# Patient Record
Sex: FEMALE | Race: WHITE | NOT HISPANIC OR LATINO | Employment: FULL TIME | URBAN - METROPOLITAN AREA
[De-identification: names, ages, dates, MRNs, and addresses within clinical notes are randomized per-mention and may not be internally consistent; named-entity substitution may affect disease eponyms.]

---

## 2017-01-10 ENCOUNTER — ALLSCRIPTS OFFICE VISIT (OUTPATIENT)
Dept: OTHER | Facility: OTHER | Age: 32
End: 2017-01-10

## 2017-08-10 ENCOUNTER — ALLSCRIPTS OFFICE VISIT (OUTPATIENT)
Dept: OTHER | Facility: OTHER | Age: 32
End: 2017-08-10

## 2017-10-05 ENCOUNTER — APPOINTMENT (EMERGENCY)
Dept: RADIOLOGY | Facility: HOSPITAL | Age: 32
End: 2017-10-05
Payer: COMMERCIAL

## 2017-10-05 ENCOUNTER — HOSPITAL ENCOUNTER (EMERGENCY)
Facility: HOSPITAL | Age: 32
Discharge: HOME | End: 2017-10-05
Attending: EMERGENCY MEDICINE
Payer: COMMERCIAL

## 2017-10-05 VITALS
RESPIRATION RATE: 19 BRPM | HEART RATE: 96 BPM | SYSTOLIC BLOOD PRESSURE: 130 MMHG | WEIGHT: 147 LBS | HEIGHT: 57 IN | OXYGEN SATURATION: 100 % | DIASTOLIC BLOOD PRESSURE: 69 MMHG | BODY MASS INDEX: 31.71 KG/M2 | TEMPERATURE: 97.5 F

## 2017-10-05 DIAGNOSIS — O03.9 MISCARRIAGE: Primary | ICD-10-CM

## 2017-10-05 LAB
ABO GROUP BLD: NORMAL
B-HCG SERPL-ACNC: 43 MIU/ML
BACTERIA UR QL AUTO: ABNORMAL /HPF
BILIRUB UR QL STRIP: NEGATIVE
CLARITY UR: CLEAR
COLOR UR: ABNORMAL
COLOR, POC: NORMAL
EXT PREG TEST URINE: NEGATIVE
GLUCOSE UR STRIP-MCNC: NEGATIVE MG/DL
HGB UR QL STRIP.AUTO: ABNORMAL
HYALINE CASTS #/AREA URNS LPF: ABNORMAL /LPF
KETONES UR STRIP-MCNC: NEGATIVE MG/DL
LEUKOCYTE ESTERASE UR QL STRIP: NEGATIVE
NITRITE UR QL STRIP: NEGATIVE
NON-SQ EPI CELLS URNS QL MICRO: ABNORMAL /HPF
PH UR STRIP.AUTO: 6 [PH] (ref 4.5–8)
PROT UR STRIP-MCNC: ABNORMAL MG/DL
RBC #/AREA URNS AUTO: ABNORMAL /HPF
RH BLD: POSITIVE
SP GR UR STRIP.AUTO: 1.01 (ref 1–1.03)
UROBILINOGEN UR QL STRIP.AUTO: 0.2 E.U./DL
WBC #/AREA URNS AUTO: ABNORMAL /HPF

## 2017-10-05 PROCEDURE — 76815 OB US LIMITED FETUS(S): CPT

## 2017-10-05 PROCEDURE — 81001 URINALYSIS AUTO W/SCOPE: CPT

## 2017-10-05 PROCEDURE — 36415 COLL VENOUS BLD VENIPUNCTURE: CPT | Performed by: EMERGENCY MEDICINE

## 2017-10-05 PROCEDURE — 84702 CHORIONIC GONADOTROPIN TEST: CPT | Performed by: EMERGENCY MEDICINE

## 2017-10-05 PROCEDURE — 99284 EMERGENCY DEPT VISIT MOD MDM: CPT

## 2017-10-05 PROCEDURE — 86901 BLOOD TYPING SEROLOGIC RH(D): CPT | Performed by: EMERGENCY MEDICINE

## 2017-10-05 PROCEDURE — 81025 URINE PREGNANCY TEST: CPT | Performed by: EMERGENCY MEDICINE

## 2017-10-05 PROCEDURE — 81002 URINALYSIS NONAUTO W/O SCOPE: CPT | Performed by: EMERGENCY MEDICINE

## 2017-10-05 PROCEDURE — 86900 BLOOD TYPING SEROLOGIC ABO: CPT | Performed by: EMERGENCY MEDICINE

## 2017-10-05 NOTE — ED PROVIDER NOTES
History  Chief Complaint   Patient presents with    Vaginal Bleeding     Pt  reports mild vaginal bleeding since 0400am   Reports to being 6 weeks pregnant with mild cramping  G1, P0  This is a 28 y o  old female who presents to the ED for evaluation of vaginal bleeding    Home pregnancy test   Last menstrual period 17  Started around 4:00 a m     When spotting of blood  No clots or POC noted  He does have lower abdominal cramping that just started  Went through 1 pad so far  Complains of urinary hesitation  Otherwise, patient denies fevers, chills, night sweats, cough, congestion, rhinorrhea, CP, dyspnea, abdominal pain, nausea, vomiting, diarrhea, constipation, dysuria, leg pain or swelling  Prior to Admission Medications   Prescriptions Last Dose Informant Patient Reported? Taking?   metFORMIN (GLUCOPHAGE) 500 mg tablet   Yes Yes   Sig: Take 500 mg by mouth daily with breakfast      Facility-Administered Medications: None     Past Medical History:   Diagnosis Date    Pre-diabetes      History reviewed  No pertinent surgical history  History reviewed  No pertinent family history  I have reviewed and agree with the history as documented  Social History   Substance Use Topics    Smoking status: Never Smoker    Smokeless tobacco: Never Used    Alcohol use No      Review of Systems   Constitutional: Negative for chills, fatigue, fever and unexpected weight change  HENT: Negative for congestion, rhinorrhea and sore throat  Eyes: Negative for redness and visual disturbance  Respiratory: Negative for cough and shortness of breath  Cardiovascular: Negative for chest pain and leg swelling  Gastrointestinal: Negative for abdominal pain, constipation, diarrhea, nausea and vomiting  Endocrine: Negative for cold intolerance and heat intolerance  Genitourinary: Positive for vaginal bleeding  Negative for dysuria, frequency, urgency and vaginal discharge     Musculoskeletal: Negative for back pain  Skin: Negative for rash  Neurological: Negative for dizziness, syncope and numbness  All other systems reviewed and are negative  Physical Exam  ED Triage Vitals [10/05/17 0457]   Temperature Pulse Respirations Blood Pressure SpO2   97 5 °F (36 4 °C) 96 19 130/69 100 %      Temp Source Heart Rate Source Patient Position - Orthostatic VS BP Location FiO2 (%)   Oral Monitor Sitting Left arm --      Pain Score       3         Physical Exam   Constitutional: She is oriented to person, place, and time  She appears well-developed and well-nourished  No distress  HENT:   Head: Normocephalic and atraumatic  Nose: Nose normal    Mouth/Throat: No oropharyngeal exudate  Eyes: Conjunctivae and EOM are normal  Pupils are equal, round, and reactive to light  Neck: Normal range of motion  Neck supple  Cardiovascular: Normal rate, regular rhythm and normal heart sounds  Exam reveals no gallop  No murmur heard  Pulmonary/Chest: Effort normal and breath sounds normal  She has no wheezes  She exhibits no tenderness  Abdominal: Soft  Bowel sounds are normal  She exhibits no distension  There is no tenderness  There is no rebound and no guarding  Genitourinary: Cervix exhibits discharge (bloody)  Cervix exhibits no friability  There is bleeding (in vault ) in the vagina  Musculoskeletal: Normal range of motion  She exhibits no tenderness or deformity  Lymphadenopathy:     She has no cervical adenopathy  Neurological: She is alert and oriented to person, place, and time  No cranial nerve deficit  Skin: Skin is warm and dry  No rash noted  She is not diaphoretic  No erythema  Psychiatric: She has a normal mood and affect  Nursing note and vitals reviewed      ED Medications  Medications - No data to display    Diagnostic Studies  Labs Reviewed   HCG, QUANTITATIVE - Abnormal        Result Value Ref Range Status    HCG, Quant 43 (*) <=6 mIU/mL Final    Narrative:      Expected Ranges:     Approximate               Approximate HCG  Gestation age          Concentration ( mIU/mL)  _____________          ______________________   Nicole Cunningham                      HCG values  0 2-1                       5-50  1-2                           2-3                         100-5000  3-4                         500-48290  4-5                         1000-35737  5-6                         [id number]  6-8                         [id number]  8-12                        [id number]   URINE MICROSCOPIC - Abnormal     RBC, UA Innumerable (*) None Seen, 0-5 /hpf Final    WBC, UA 2-4 (*) None Seen, 0-5, 5-55, 5-65 /hpf Final    Hyaline Casts, UA 3-5 (*) None Seen /lpf Final    Epithelial Cells Occasional  None Seen, Occasional /hpf Final    Bacteria, UA Occasional  None Seen, Occasional /hpf Final   ED URINE MACROSCOPIC - Abnormal     Protein,  (2+) (*) Negative mg/dl Final    Blood, UA Large (*) Negative Final    Color, UA Bloody   Final    Clarity, UA Clear   Final    pH, UA 6 0  4 5 - 8 0 Final    Leukocytes, UA Negative  Negative Final    Nitrite, UA Negative  Negative Final    Glucose, UA Negative  Negative mg/dl Final    Ketones, UA Negative  Negative mg/dl Final    Urobilinogen, UA 0 2  0 2, 1 0 E U /dl E U /dl Final    Bilirubin, UA Negative  Negative Final    Specific [employer], UA 1 010  1 003 - 1 030 Final    Narrative:     CLINITEK RESULT   POCT URINALYSIS DIPSTICK - Normal    Color, UA pink/clear   Final   POCT PREGNANCY, URINE - Normal    EXT PREG TEST UR (Ref: Negative) negative   Final   ABO/RH    ABO Grouping AB   Final    Rh Factor Positive   Final     US OB pregnancy limited with transvaginal   Final Result   No intrauterine gestation or adnexal mass identified  The endometrial stripe is thickened and there is mobile material within the endometrial canal which likely represents blood products given history of bleeding  Findings are suspicious for    spontaneous    Early intrauterine pregnancy and ectopic pregnancy could also be considered  No adnexal masses identified  Correlate with serial quantitative BHCG  ##phoslh##phoslh         ##cfslh   I personally discussed this result with Kenyettadane Feng on 10/5/2017 8:35 AM    ##         Workstation performed: [id number]           Procedures  Procedures    Phone Consults  ED Phone Contact    ED Course    A/P: This is a 28 y o  female who presents to the ED for evaluation of vaginal bleeding  Concern for miscarriage vs ectopic  Will check US  Type and Rh, Beta Quant  2702 Bedside US is inconclusive  Will get formal     MDM  CritCare Time    Disposition  Final diagnoses:   Miscarriage     ED Disposition     ED Disposition Condition Comment    Discharge  [address] discharge to home/self care  Condition at discharge: Stable        Follow-up Information     Follow up With Specialties Details Why [address]  Call in 2 days As needed, If symptoms worsen [address] [place] [address]  [phone]          Discharge Medication List as of 10/5/2017  6:56 AM      CONTINUE these medications which have NOT CHANGED    Details   metFORMIN (GLUCOPHAGE) 500 mg tablet Take 500 mg by mouth daily with breakfast, Historical Med           No discharge procedures on file  ED Provider  Attending physically available and evaluated [address]  I managed the patient along with the ED Attending      Electronically Signed by       Matthew Ortez MD  Resident  10/05/17 6955

## 2017-10-05 NOTE — ED ATTENDING ATTESTATION
I, [address], DO, saw and evaluated the patient  I have discussed the patient with the resident/non-physician practitioner and agree with the resident's/non-physician practitioner's findings, Plan of Care, and MDM as documented in the resident's/non-physician practitioner's note, except where noted  All available labs and Radiology studies were reviewed  At this point I agree with the current assessment done in the Emergency Department  I have conducted an independent evaluation of this patient a history and physical is as follows:    80-year-old female presents with vaginal bleeding  Patient is  her last menstrual period was 2017  Bleeding started 4:00 a m  initially spotting and now more likely regular period  No fevers, chills, vomiting or diarrhea  Exam-no acute distress, heart regular, lungs clear, abdomen soft nontender  Plan-bedside ultrasound unable to visualize IUP, pelvic exam shows blood coming from the os but os is closed    We will do formal ultrasound, check quant, check type and screen    Critical Care Time  CritCare Time

## 2017-10-05 NOTE — DISCHARGE INSTRUCTIONS
Miscarriage   WHAT YOU NEED TO KNOW:   A miscarriage is the loss of a fetus within the first 20 weeks of pregnancy  A miscarriage may also be called a spontaneous  or an early pregnancy loss  DISCHARGE INSTRUCTIONS:   Return to the emergency department if:   · You have foul-smelling drainage or pus coming from your vagina  · You have heavy vaginal bleeding and soak 1 pad or more in an hour  · You have severe abdominal pain  · You feel like your heart is beating faster than normal      · You feel extremely weak or dizzy  Contact your healthcare provider if:   · You have a fever greater than 100 4°F or chills  · You have extreme sadness, grief, or feel unable to cope with what has happened  · You have questions or concerns about your condition or care  Self-care:   · Do not put anything in your vagina for 2 weeks or as directed  Do not use tampons, douche, or have sex  These actions can cause infection and pain  · Use sanitary pads as needed  You may have light bleeding or spotting for 2 weeks  · Do not take a bath or go swimming for 2 weeks or as directed  These actions may increase your risk for an infection  Take showers only  · Rest as needed  Slowly start to do more each day  Return to your daily activities as directed  · Talk to your healthcare provider about birth control  If you would like to prevent another pregnancy, ask your healthcare provider which type of birth control is best for you  · Join a support group or therapy to help you cope  A miscarriage may be very difficult for you, your partner, and other members of your family  There is no right way to feel after a miscarriage  You may feel overwhelming grief or other emotions  It may be helpful to talk to a friend, family member, or counselor about your feelings  You may worry that you could have another miscarriage  Talk to your healthcare provider about your concerns   He may be able to help you reduce the risk for another miscarriage  He may also help you find ways to cope with grief  For more information:   · [employer]  [address] [place] [address]  Phone: [phone]  Phone: [phone]  Web Address: http://[employer]/  org  · [facility]  [address] [facility] , [address] [place] [address]  Web Address: [employer]  Follow up with your healthcare provider as directed: You may need to see your healthcare provider for blood tests or an ultrasound  Write down your questions so you remember to ask them during your visits  © 2017 [facility] Information is for End User's use only and may not be sold, redistributed or otherwise used for commercial purposes  All illustrations and images included in CareNotes® are the copyrighted property of [employer]  or [employer]  The above information is an  only  It is not intended as medical advice for individual conditions or treatments  Talk to your doctor, nurse or pharmacist before following any medical regimen to see if it is safe and effective for you

## 2017-10-09 ENCOUNTER — GENERIC CONVERSION - ENCOUNTER (OUTPATIENT)
Dept: OTHER | Facility: OTHER | Age: 32
End: 2017-10-09

## 2017-10-09 ENCOUNTER — TRANSCRIBE ORDERS (OUTPATIENT)
Dept: LAB | Facility: HOSPITAL | Age: 32
End: 2017-10-09

## 2017-10-09 ENCOUNTER — APPOINTMENT (OUTPATIENT)
Dept: LAB | Facility: HOSPITAL | Age: 32
End: 2017-10-09
Payer: COMMERCIAL

## 2017-10-09 DIAGNOSIS — R73.09 OTHER ABNORMAL GLUCOSE: ICD-10-CM

## 2017-10-09 DIAGNOSIS — O20.0 THREATENED ABORTION: ICD-10-CM

## 2017-10-09 DIAGNOSIS — O03.9 COMPLETE OR UNSPECIFIED SPONTANEOUS ABORTION WITHOUT COMPLICATION: ICD-10-CM

## 2017-10-09 DIAGNOSIS — R53.83 OTHER FATIGUE: ICD-10-CM

## 2017-10-09 LAB
B-HCG SERPL-ACNC: 25 MIU/ML
HCG, QUALITATIVE (HISTORICAL): NEGATIVE

## 2017-10-09 PROCEDURE — 36415 COLL VENOUS BLD VENIPUNCTURE: CPT

## 2017-10-09 PROCEDURE — 84702 CHORIONIC GONADOTROPIN TEST: CPT

## 2017-10-10 ENCOUNTER — HOSPITAL ENCOUNTER (OUTPATIENT)
Dept: RADIOLOGY | Facility: HOSPITAL | Age: 32
Discharge: HOME | End: 2017-10-10
Payer: COMMERCIAL

## 2017-10-10 DIAGNOSIS — O20.0 THREATENED ABORTION: ICD-10-CM

## 2017-10-10 PROCEDURE — 76815 OB US LIMITED FETUS(S): CPT

## 2017-10-11 ENCOUNTER — GENERIC CONVERSION - ENCOUNTER (OUTPATIENT)
Dept: OTHER | Facility: OTHER | Age: 32
End: 2017-10-11

## 2017-10-13 ENCOUNTER — ALLSCRIPTS OFFICE VISIT (OUTPATIENT)
Dept: OTHER | Facility: OTHER | Age: 32
End: 2017-10-13

## 2017-10-13 ENCOUNTER — GENERIC CONVERSION - ENCOUNTER (OUTPATIENT)
Dept: OTHER | Facility: OTHER | Age: 32
End: 2017-10-13

## 2017-10-18 ENCOUNTER — ALLSCRIPTS OFFICE VISIT (OUTPATIENT)
Dept: OTHER | Facility: OTHER | Age: 32
End: 2017-10-18

## 2017-10-23 ENCOUNTER — APPOINTMENT (OUTPATIENT)
Dept: LAB | Facility: HOSPITAL | Age: 32
End: 2017-10-23
Attending: OBSTETRICS & GYNECOLOGY
Payer: COMMERCIAL

## 2017-10-23 ENCOUNTER — TRANSCRIBE ORDERS (OUTPATIENT)
Dept: LAB | Facility: HOSPITAL | Age: 32
End: 2017-10-23

## 2017-10-23 DIAGNOSIS — O20.0 THREATENED ABORTION: ICD-10-CM

## 2017-10-23 LAB — B-HCG SERPL-ACNC: 16 MIU/ML

## 2017-10-23 PROCEDURE — 36415 COLL VENOUS BLD VENIPUNCTURE: CPT

## 2017-10-23 PROCEDURE — 84702 CHORIONIC GONADOTROPIN TEST: CPT

## 2017-10-24 ENCOUNTER — GENERIC CONVERSION - ENCOUNTER (OUTPATIENT)
Dept: OTHER | Facility: OTHER | Age: 32
End: 2017-10-24

## 2017-10-31 ENCOUNTER — TRANSCRIBE ORDERS (OUTPATIENT)
Dept: LAB | Facility: HOSPITAL | Age: 32
End: 2017-10-31

## 2017-10-31 ENCOUNTER — APPOINTMENT (OUTPATIENT)
Dept: LAB | Facility: HOSPITAL | Age: 32
End: 2017-10-31
Payer: COMMERCIAL

## 2017-10-31 DIAGNOSIS — O20.0 THREATENED ABORTION: ICD-10-CM

## 2017-10-31 LAB — B-HCG SERPL-ACNC: 10 MIU/ML

## 2017-10-31 PROCEDURE — 84702 CHORIONIC GONADOTROPIN TEST: CPT

## 2017-10-31 PROCEDURE — 36415 COLL VENOUS BLD VENIPUNCTURE: CPT

## 2017-11-01 ENCOUNTER — GENERIC CONVERSION - ENCOUNTER (OUTPATIENT)
Dept: OTHER | Facility: OTHER | Age: 32
End: 2017-11-01

## 2017-11-27 ENCOUNTER — APPOINTMENT (OUTPATIENT)
Dept: LAB | Facility: HOSPITAL | Age: 32
End: 2017-11-27
Payer: COMMERCIAL

## 2017-11-27 DIAGNOSIS — O03.9 COMPLETE OR UNSPECIFIED SPONTANEOUS ABORTION WITHOUT COMPLICATION: ICD-10-CM

## 2017-11-27 LAB — B-HCG SERPL-ACNC: <2 MIU/ML

## 2017-11-27 PROCEDURE — 84702 CHORIONIC GONADOTROPIN TEST: CPT

## 2017-11-27 PROCEDURE — 36415 COLL VENOUS BLD VENIPUNCTURE: CPT

## 2017-11-28 ENCOUNTER — GENERIC CONVERSION - ENCOUNTER (OUTPATIENT)
Dept: OTHER | Facility: OTHER | Age: 32
End: 2017-11-28

## 2017-12-12 ENCOUNTER — TRANSCRIBE ORDERS (OUTPATIENT)
Dept: LAB | Facility: HOSPITAL | Age: 32
End: 2017-12-12

## 2017-12-12 ENCOUNTER — GENERIC CONVERSION - ENCOUNTER (OUTPATIENT)
Dept: OTHER | Facility: OTHER | Age: 32
End: 2017-12-12

## 2017-12-12 ENCOUNTER — APPOINTMENT (OUTPATIENT)
Dept: LAB | Facility: HOSPITAL | Age: 32
End: 2017-12-12
Payer: COMMERCIAL

## 2017-12-12 DIAGNOSIS — N91.2 AMENORRHEA: ICD-10-CM

## 2017-12-12 DIAGNOSIS — Z34.91 ENCOUNTER FOR SUPERVISION OF NORMAL PREGNANCY IN FIRST TRIMESTER: ICD-10-CM

## 2017-12-12 LAB
B-HCG SERPL-ACNC: 375 MIU/ML
PROGEST SERPL-MCNC: 30.4 NG/ML

## 2017-12-12 PROCEDURE — 84144 ASSAY OF PROGESTERONE: CPT

## 2017-12-12 PROCEDURE — 36415 COLL VENOUS BLD VENIPUNCTURE: CPT

## 2017-12-12 PROCEDURE — 84702 CHORIONIC GONADOTROPIN TEST: CPT

## 2017-12-14 ENCOUNTER — APPOINTMENT (OUTPATIENT)
Dept: LAB | Facility: HOSPITAL | Age: 32
End: 2017-12-14
Payer: COMMERCIAL

## 2017-12-14 DIAGNOSIS — N91.2 AMENORRHEA: ICD-10-CM

## 2017-12-14 LAB — B-HCG SERPL-ACNC: 896 MIU/ML

## 2017-12-14 PROCEDURE — 84702 CHORIONIC GONADOTROPIN TEST: CPT

## 2017-12-14 PROCEDURE — 36415 COLL VENOUS BLD VENIPUNCTURE: CPT

## 2017-12-15 ENCOUNTER — GENERIC CONVERSION - ENCOUNTER (OUTPATIENT)
Dept: OTHER | Facility: OTHER | Age: 32
End: 2017-12-15

## 2017-12-18 ENCOUNTER — APPOINTMENT (EMERGENCY)
Dept: RADIOLOGY | Facility: HOSPITAL | Age: 32
End: 2017-12-18
Payer: COMMERCIAL

## 2017-12-18 ENCOUNTER — HOSPITAL ENCOUNTER (EMERGENCY)
Facility: HOSPITAL | Age: 32
Discharge: HOME | End: 2017-12-18
Attending: EMERGENCY MEDICINE | Admitting: EMERGENCY MEDICINE
Payer: COMMERCIAL

## 2017-12-18 VITALS
RESPIRATION RATE: 18 BRPM | HEIGHT: 57 IN | HEART RATE: 98 BPM | TEMPERATURE: 97.8 F | SYSTOLIC BLOOD PRESSURE: 118 MMHG | OXYGEN SATURATION: 98 % | DIASTOLIC BLOOD PRESSURE: 64 MMHG | WEIGHT: 151 LBS | BODY MASS INDEX: 32.58 KG/M2

## 2017-12-18 DIAGNOSIS — R51.9 HEADACHE: ICD-10-CM

## 2017-12-18 DIAGNOSIS — R10.9 ABDOMINAL PAIN: ICD-10-CM

## 2017-12-18 DIAGNOSIS — Z34.90 PREGNANCY: ICD-10-CM

## 2017-12-18 DIAGNOSIS — J06.9 URI (UPPER RESPIRATORY INFECTION): Primary | ICD-10-CM

## 2017-12-18 LAB
ABO GROUP BLD: NORMAL
ALBUMIN SERPL BCP-MCNC: 3.6 G/DL (ref 3.5–5)
ALP SERPL-CCNC: 42 U/L (ref 46–116)
ALT SERPL W P-5'-P-CCNC: 25 U/L (ref 12–78)
ANION GAP SERPL CALCULATED.3IONS-SCNC: 7 MMOL/L (ref 4–13)
AST SERPL W P-5'-P-CCNC: 21 U/L (ref 5–45)
B-HCG SERPL-ACNC: 3067 MIU/ML
BASOPHILS # BLD AUTO: 0.02 THOUSANDS/ΜL (ref 0–0.1)
BASOPHILS NFR BLD AUTO: 0 % (ref 0–1)
BILIRUB SERPL-MCNC: 0.19 MG/DL (ref 0.2–1)
BILIRUB UR QL STRIP: NEGATIVE
BLD GP AB SCN SERPL QL: NEGATIVE
BUN SERPL-MCNC: 5 MG/DL (ref 5–25)
CALCIUM SERPL-MCNC: 8.9 MG/DL (ref 8.3–10.1)
CHLORIDE SERPL-SCNC: 108 MMOL/L (ref 100–108)
CLARITY UR: CLEAR
CLARITY, POC: CLEAR
CO2 SERPL-SCNC: 22 MMOL/L (ref 21–32)
COLOR UR: YELLOW
COLOR, POC: YELLOW
CREAT SERPL-MCNC: 0.5 MG/DL (ref 0.6–1.3)
EOSINOPHIL # BLD AUTO: 0.09 THOUSAND/ΜL (ref 0–0.61)
EOSINOPHIL NFR BLD AUTO: 1 % (ref 0–6)
ERYTHROCYTE [DISTWIDTH] IN BLOOD BY AUTOMATED COUNT: 13.8 % (ref 11.6–15.1)
EXT PREG TEST URINE: NORMAL
GFR SERPL CREATININE-BSD FRML MDRD: 128 ML/MIN/1.73SQ M
GLUCOSE SERPL-MCNC: 86 MG/DL (ref 65–140)
GLUCOSE UR STRIP-MCNC: NEGATIVE MG/DL
HCT VFR BLD AUTO: 38.9 % (ref 34.8–46.1)
HGB BLD-MCNC: 12.9 G/DL (ref 11.5–15.4)
HGB UR QL STRIP.AUTO: NEGATIVE
KETONES UR STRIP-MCNC: NEGATIVE MG/DL
LEUKOCYTE ESTERASE UR QL STRIP: NEGATIVE
LYMPHOCYTES # BLD AUTO: 2.71 THOUSANDS/ΜL (ref 0.6–4.47)
LYMPHOCYTES NFR BLD AUTO: 30 % (ref 14–44)
MCH RBC QN AUTO: 28.2 PG (ref 26.8–34.3)
MCHC RBC AUTO-ENTMCNC: 33.2 G/DL (ref 31.4–37.4)
MCV RBC AUTO: 85 FL (ref 82–98)
MONOCYTES # BLD AUTO: 0.6 THOUSAND/ΜL (ref 0.17–1.22)
MONOCYTES NFR BLD AUTO: 7 % (ref 4–12)
NEUTROPHILS # BLD AUTO: 5.7 THOUSANDS/ΜL (ref 1.85–7.62)
NEUTS SEG NFR BLD AUTO: 62 % (ref 43–75)
NITRITE UR QL STRIP: NEGATIVE
NRBC BLD AUTO-RTO: 0 /100 WBCS
PH UR STRIP.AUTO: 6 [PH] (ref 4.5–8)
PLATELET # BLD AUTO: 318 THOUSANDS/UL (ref 149–390)
PMV BLD AUTO: 9.4 FL (ref 8.9–12.7)
POTASSIUM SERPL-SCNC: 3.9 MMOL/L (ref 3.5–5.3)
PROT SERPL-MCNC: 7.3 G/DL (ref 6.4–8.2)
PROT UR STRIP-MCNC: NEGATIVE MG/DL
RBC # BLD AUTO: 4.57 MILLION/UL (ref 3.81–5.12)
RH BLD: POSITIVE
SODIUM SERPL-SCNC: 137 MMOL/L (ref 136–145)
SP GR UR STRIP.AUTO: 1.01 (ref 1–1.03)
SPECIMEN EXPIRATION DATE: NORMAL
T4 FREE SERPL-MCNC: 1.02 NG/DL (ref 0.76–1.46)
TSH SERPL DL<=0.05 MIU/L-ACNC: 3.16 UIU/ML (ref 0.36–3.74)
UROBILINOGEN UR QL STRIP.AUTO: 0.2 E.U./DL
WBC # BLD AUTO: 9.15 THOUSAND/UL (ref 4.31–10.16)

## 2017-12-18 PROCEDURE — 81002 URINALYSIS NONAUTO W/O SCOPE: CPT | Performed by: EMERGENCY MEDICINE

## 2017-12-18 PROCEDURE — 86901 BLOOD TYPING SEROLOGIC RH(D): CPT | Performed by: EMERGENCY MEDICINE

## 2017-12-18 PROCEDURE — 99283 EMERGENCY DEPT VISIT LOW MDM: CPT

## 2017-12-18 PROCEDURE — 85025 COMPLETE CBC W/AUTO DIFF WBC: CPT | Performed by: EMERGENCY MEDICINE

## 2017-12-18 PROCEDURE — 84443 ASSAY THYROID STIM HORMONE: CPT | Performed by: EMERGENCY MEDICINE

## 2017-12-18 PROCEDURE — 86900 BLOOD TYPING SEROLOGIC ABO: CPT | Performed by: EMERGENCY MEDICINE

## 2017-12-18 PROCEDURE — 81025 URINE PREGNANCY TEST: CPT | Performed by: EMERGENCY MEDICINE

## 2017-12-18 PROCEDURE — 80053 COMPREHEN METABOLIC PANEL: CPT | Performed by: EMERGENCY MEDICINE

## 2017-12-18 PROCEDURE — 86850 RBC ANTIBODY SCREEN: CPT | Performed by: EMERGENCY MEDICINE

## 2017-12-18 PROCEDURE — 81003 URINALYSIS AUTO W/O SCOPE: CPT

## 2017-12-18 PROCEDURE — 36415 COLL VENOUS BLD VENIPUNCTURE: CPT | Performed by: EMERGENCY MEDICINE

## 2017-12-18 PROCEDURE — 84702 CHORIONIC GONADOTROPIN TEST: CPT | Performed by: EMERGENCY MEDICINE

## 2017-12-18 PROCEDURE — 76801 OB US < 14 WKS SINGLE FETUS: CPT

## 2017-12-18 PROCEDURE — 84439 ASSAY OF FREE THYROXINE: CPT | Performed by: EMERGENCY MEDICINE

## 2017-12-18 RX ORDER — ACETAMINOPHEN 325 MG/1
650 TABLET ORAL ONCE
Status: COMPLETED | OUTPATIENT
Start: 2017-12-18 | End: 2017-12-18

## 2017-12-18 RX ADMIN — ACETAMINOPHEN 650 MG: 325 TABLET, FILM COATED ORAL at 15:41

## 2017-12-18 NOTE — ED PROVIDER NOTES
History  Chief Complaint   Patient presents with    Headache     Pt 6 weeks pregnant, c/o headache for 4 days and some abd pain     HPI   Patient is a  who is 6 weeks pregnant who presents for evaluation of 4 days of URI symptoms with headache  Patient states it started with rhinorrhea and progressed to sinus congestion at then sinus pain and then sinus headache radiating to her ear  She has also had throat pain with cough  She has not taken anything because she is unclear what medication she can take because she is pregnant  Patient has a history of recent miscarriage in October  Last menstrual period was in November  Patient has yet to get a ultrasound but did get a blood test to confirm the pregnancy  Yesterday the patient started to have cramping abdominal pain on the left side  She has no vaginal discharge, vaginal bleeding  She describes the abdominal pain as a for a menstrual cramp like  Pain lasted 5-10 minutes every hour  Not associated with movement or eating or defecation  The patient denies any dysuria, pain in her vagina or pelvis, pain with defecation  Prior to Admission Medications   Prescriptions Last Dose Informant Patient Reported? Taking? Prenatal MV-Min-Fe Fum-FA-DHA (PRENATAL 1 PO) 2017 at Unknown time  Yes Yes   Sig: Take by mouth      Facility-Administered Medications: None       Past Medical History:   Diagnosis Date    Pre-diabetes        History reviewed  No pertinent surgical history  History reviewed  No pertinent family history  I have reviewed and agree with the history as documented  Social History   Substance Use Topics    Smoking status: Never Smoker    Smokeless tobacco: Never Used    Alcohol use No        Review of Systems   Constitutional: Negative  HENT: Positive for postnasal drip, rhinorrhea, sinus pain and sinus pressure  Eyes: Negative  Respiratory: Negative  Cardiovascular: Negative  Gastrointestinal: Negative  Endocrine: Negative  Genitourinary: Negative  Negative for decreased urine volume, dysuria, flank pain, pelvic pain, urgency, vaginal bleeding and vaginal pain  Musculoskeletal: Negative  Skin: Negative  Allergic/Immunologic: Negative  Neurological: Positive for headaches  Hematological: Negative  Psychiatric/Behavioral: Negative  Physical Exam  ED Triage Vitals [12/18/17 1141]   Temperature Pulse Respirations Blood Pressure SpO2   97 8 °F (36 6 °C) 76 18 122/65 98 %      Temp Source Heart Rate Source Patient Position - Orthostatic VS BP Location FiO2 (%)   Oral Monitor Sitting Left arm --      Pain Score       8           Orthostatic Vital Signs  Vitals:    12/18/17 1141 12/18/17 1244 12/18/17 1401 12/18/17 1506   BP: 122/65 110/53 107/64 118/64   Pulse: 76 84 98    Patient Position - Orthostatic VS: Sitting Lying Lying Lying       Physical Exam   Constitutional: She is oriented to person, place, and time  She appears well-developed and well-nourished  No distress  HENT:   Head: Normocephalic and atraumatic  Right Ear: External ear normal    Left Ear: External ear normal    Mouth/Throat: Oropharynx is clear and moist  No oropharyngeal exudate  she is congested  She has no tenderness on her face with percussion  Eyes: Conjunctivae and EOM are normal  Pupils are equal, round, and reactive to light  Right eye exhibits no discharge  Left eye exhibits no discharge  No scleral icterus  Neck: Normal range of motion  Neck supple  No tracheal deviation present  No thyromegaly present  Cardiovascular: Normal rate, regular rhythm and intact distal pulses  Exam reveals no gallop and no friction rub  No murmur heard  Pulmonary/Chest: Effort normal and breath sounds normal  No stridor  No respiratory distress  She has no wheezes  She has no rales  Abdominal: Soft  Bowel sounds are normal  She exhibits no distension  There is tenderness  There is no rebound and no guarding  Patient has tenderness to deep palpation left lower quadrant  Musculoskeletal: Normal range of motion  She exhibits no edema or deformity  Neurological: She is alert and oriented to person, place, and time  No cranial nerve deficit  Skin: Skin is warm and dry  No rash noted  She is not diaphoretic  No erythema  Psychiatric: She has a normal mood and affect  Her behavior is normal  Thought content normal    Nursing note and vitals reviewed  ED Medications  Medications   acetaminophen (TYLENOL) tablet 650 mg (650 mg Oral Given 12/18/17 1541)       Diagnostic Studies  Results Reviewed     Procedure Component Value Units Date/Time    Comprehensive metabolic panel [[id number]]  (Abnormal) Collected:  12/18/17 1356    Lab Status:  Final result Specimen:  Blood from Arm, Left Updated:  12/18/17 1441     Sodium 137 mmol/L      Potassium 3 9 mmol/L      Chloride 108 mmol/L      CO2 22 mmol/L      Anion Gap 7 mmol/L      BUN 5 mg/dL      Creatinine 0 50 (L) mg/dL      Glucose 86 mg/dL      Calcium 8 9 mg/dL      AST 21 U/L      ALT 25 U/L      Alkaline Phosphatase 42 (L) U/L      Total Protein 7 3 g/dL      Albumin 3 6 g/dL      Total Bilirubin 0 19 (L) mg/dL      eGFR 128 ml/min/1 73sq m     Narrative:         National Kidney Disease Education Program recommendations are as follows:  GFR calculation is accurate only with a steady state creatinine  Chronic Kidney disease less than 60 ml/min/1 73 sq  meters  Kidney failure less than 15 ml/min/1 73 sq  meters  TSH [[id number]]  (Normal) Collected:  12/18/17 1356    Lab Status:  Final result Specimen:  Blood from Arm, Left Updated:  12/18/17 1441     TSH 3RD GENERATON 3 160 uIU/mL     Narrative:         Patients undergoing fluorescein dye angiography may retain small amounts of fluorescein in the body for 48-72 hours post procedure  Samples containing fluorescein can produce falsely depressed TSH values   If the patient had this procedure,a specimen should be resubmitted post fluorescein clearance            The recommended reference ranges for TSH during pregnancy are as follows:  First trimester 0 1 to 2 5 uIU/mL  Second trimester  0 2 to 3 0 uIU/mL  Third trimester 0 3 to 3 0 uIU/m      T4, free [[id number]]  (Normal) Collected:  12/18/17 1356    Lab Status:  Final result Specimen:  Blood from Arm, Left Updated:  12/18/17 1441     Free T4 1 02 ng/dL     hCG, quantitative [[id number]]  (Abnormal) Collected:  12/18/17 1356    Lab Status:  Final result Specimen:  Blood from Arm, Left Updated:  12/18/17 1441     HCG, Quant 3,067 (H) mIU/mL     Narrative:          Expected Ranges:     Approximate               Approximate HCG  Gestation age          Concentration ( mIU/mL)  _____________          ______________________   Leonora Husky                      HCG values  0 2-1                       5-50  1-2                           2-3                         100-5000  3-4                         500-21909  4-5                         1000-62271  5-6                         84554-060517  6-8                         86670-118660  8-12                        87099-614362    CBC and differential [[id number]]  (Normal) Collected:  12/18/17 1356    Lab Status:  Final result Specimen:  Blood from Arm, Left Updated:  12/18/17 1404     WBC 9 15 Thousand/uL      RBC 4 57 Million/uL      Hemoglobin 12 9 g/dL      Hematocrit 38 9 %      MCV 85 fL      MCH 28 2 pg      MCHC 33 2 g/dL      RDW 13 8 %      MPV 9 4 fL      Platelets 525 Thousands/uL      nRBC 0 /100 WBCs      Neutrophils Relative 62 %      Lymphocytes Relative 30 %      Monocytes Relative 7 %      Eosinophils Relative 1 %      Basophils Relative 0 %      Neutrophils Absolute 5 70 Thousands/µL      Lymphocytes Absolute 2 71 Thousands/µL      Monocytes Absolute 0 60 Thousand/µL      Eosinophils Absolute 0 09 Thousand/µL      Basophils Absolute 0 02 Thousands/µL     POCT urinalysis dipstick [[id number]]  (Normal) Resulted:  12/18/17 1255    Lab Status:  Final result Specimen:  Urine Updated:  12/18/17 1255     Color, UA yellow     Clarity, UA clear    POCT pregnancy, urine [[id number]]  (Normal) Resulted:  12/18/17 1255    Lab Status:  Final result Updated:  12/18/17 1255     EXT PREG TEST UR (Ref: Negative) POSITIVE (+)    ED Urine Macroscopic [[id number]]  (Normal) Collected:  12/18/17 1219    Lab Status:  Final result Specimen:  Urine Updated:  12/18/17 1219     Color, UA Yellow     Clarity, UA Clear     pH, UA 6 0     Leukocytes, UA Negative     Nitrite, UA Negative     Protein, UA Negative mg/dl      Glucose, UA Negative mg/dl      Ketones, UA Negative mg/dl      Urobilinogen, UA 0 2 E U /dl      Bilirubin, UA Negative     Blood, UA Negative     Specific Gravity, UA 1 015    Narrative:       CLINITEK RESULT                 US OB < 14 weeks with transvaginal   Final Result by Grant Damon DO (12/18 1538)   Very Early intrauterine gestation  Too small to estimate gestational age  Recommend follow-up beta hCG levels and follow-up ultrasound in one week  Workstation performed: [id number]               Procedures  Procedures      Phone Consults  ED Phone Contact    ED Course  ED Course       return precautions were discussed with the patient, as well as discussed follow up with Obstetrics in 1 week for follow-up blood work and ultrasound  Patient verbalized understanding was discharged  MDM  Number of Diagnoses or Management Options  Abdominal pain:   Headache:   Pregnancy:   URI (upper respiratory infection):   Diagnosis management comments: 27-year-old woman with upper respiratory infection and likely sinus headache  More concerned with her cramping abdominal pain in the setting of recent pregnancy  Will get a ultrasound to evaluate the ectopic versus intrauterine pregnancy      CritCare Time    Disposition  Final diagnoses:   URI (upper respiratory infection)   Headache   Abdominal pain   Pregnancy Time reflects when diagnosis was documented in both MDM as applicable and the Disposition within this note     Time User Action Codes Description Comment    12/18/2017  3:54 PM Gracie Smallwood Add [J06 9] URI (upper respiratory infection)     12/18/2017  3:54 PM Gracie Smallwood Add [R51] Headache     12/18/2017  3:54 PM Colonel Lemmings [R10 9] Abdominal pain     12/18/2017  3:54 PM Gracie Smallwood Add [Z34 90] Pregnancy       ED Disposition     ED Disposition Condition Comment    Discharge  [facility] discharge to home/self care  Condition at discharge: Stable        Follow-up Information     Follow up With Specialties Details Why [address] [facility] Emergency Department Emergency Medicine Go to If symptoms worsen [address] [place] [address], [address] [place] [address]  Call in 1 day to get established with primary care [address]  Call To get established with Obstetric care [address] [place] [address]  [phone]           Discharge Medication List as of 12/18/2017  3:57 PM      CONTINUE these medications which have NOT CHANGED    Details   Prenatal MV-Min-Fe Fum-FA-DHA (PRENATAL 1 PO) Take by mouth, Historical Med           No discharge procedures on file  ED Provider  Attending physically available and evaluated [facility]  I managed the patient along with the ED Attending      Electronically Signed by         Carol Wheeler MD  Resident  12/19/17 0960

## 2017-12-18 NOTE — DISCHARGE INSTRUCTIONS
Abdominal Pain   WHAT YOU NEED TO KNOW:   Abdominal pain can be dull, achy, or sharp  You may have pain in one area of your abdomen, or in your entire abdomen  Your pain may be caused by a condition such as constipation, food sensitivity or poisoning, infection, or a blockage  Abdominal pain can also be from a hernia, appendicitis, or an ulcer  Liver, gallbladder, or kidney conditions can also cause abdominal pain  The cause of your abdominal pain may be unknown  DISCHARGE INSTRUCTIONS:   Return to the emergency department if:   · You have new chest pain or shortness of breath  · You have pulsing pain in your upper abdomen or lower back that suddenly becomes constant  · Your pain is in the right lower abdominal area and worsens with movement  · You have a fever over 100 4°F (38°C) or shaking chills  · You are vomiting and cannot keep food or liquids down  · Your pain does not improve or gets worse over the next 8 to 12 hours  · You see blood in your vomit or bowel movements, or they look black and tarry  · Your skin or the whites of your eyes turn yellow  · You are a woman and have a large amount of vaginal bleeding that is not your monthly period  Contact your healthcare provider if:   · You have pain in your lower back  · You are a man and have pain in your testicles  · You have pain when you urinate  · You have questions or concerns about your condition or care  Follow up with your healthcare provider within 24 hours or as directed:  Write down your questions so you remember to ask them during your visits  Medicines:   · Medicines  may be given to calm your stomach and prevent vomiting or to decrease pain  Ask how to take pain medicine safely  · Take your medicine as directed  Contact your healthcare provider if you think your medicine is not helping or if you have side effects  Tell him of her if you are allergic to any medicine   Keep a list of the medicines, vitamins, and herbs you take  Include the amounts, and when and why you take them  Bring the list or the pill bottles to follow-up visits  Carry your medicine list with you in case of an emergency  © 2017 [employer] Information is for End User's use only and may not be sold, redistributed or otherwise used for commercial purposes  All illustrations and images included in CareNotes® are the copyrighted property of A D A M , Inc  or Sumanth Ortega  The above information is an  only  It is not intended as medical advice for individual conditions or treatments  Talk to your doctor, nurse or pharmacist before following any medical regimen to see if it is safe and effective for you

## 2017-12-18 NOTE — ED NOTES
Dr Berger Dears at patient bedside to medically evaluate patient       Landon Montanez, RN  12/18/17 7774

## 2017-12-29 ENCOUNTER — TRANSCRIBE ORDERS (OUTPATIENT)
Dept: LAB | Facility: HOSPITAL | Age: 32
End: 2017-12-29

## 2017-12-29 ENCOUNTER — GENERIC CONVERSION - ENCOUNTER (OUTPATIENT)
Dept: OTHER | Facility: OTHER | Age: 32
End: 2017-12-29

## 2017-12-29 ENCOUNTER — APPOINTMENT (OUTPATIENT)
Dept: LAB | Facility: HOSPITAL | Age: 32
End: 2017-12-29
Payer: COMMERCIAL

## 2017-12-29 DIAGNOSIS — Z34.91 ENCOUNTER FOR SUPERVISION OF NORMAL PREGNANCY IN FIRST TRIMESTER: ICD-10-CM

## 2017-12-29 LAB
B-HCG SERPL-ACNC: 6892 MIU/ML
PROGEST SERPL-MCNC: 14.9 NG/ML

## 2017-12-29 PROCEDURE — 84144 ASSAY OF PROGESTERONE: CPT

## 2017-12-29 PROCEDURE — 36415 COLL VENOUS BLD VENIPUNCTURE: CPT

## 2017-12-29 PROCEDURE — 84702 CHORIONIC GONADOTROPIN TEST: CPT

## 2018-01-05 ENCOUNTER — GENERIC CONVERSION - ENCOUNTER (OUTPATIENT)
Dept: OTHER | Facility: OTHER | Age: 33
End: 2018-01-05

## 2018-01-09 NOTE — MISCELLANEOUS
Message  Called back patient re: TVUS result which was wnl  Patient was reassured that her vaginal bleeding is likely 2/2 miscarriage and not likely ectopic pregnancy  Patient is currently asx  She still desires future pregnancy, so patient was counselled to take prenatal vitamins  Signatures   Electronically signed by :  Gala Thompson DO; Oct 11 2017  2:07PM EST                       (Author)

## 2018-01-09 NOTE — MISCELLANEOUS
Provider Comments  Provider Comments:   pt was a no show to appt today      Signatures   Electronically signed by : Jenni De León, ; Leo 10 2017 11:57AM EST                       (Author)

## 2018-01-11 ENCOUNTER — APPOINTMENT (OUTPATIENT)
Dept: LAB | Facility: HOSPITAL | Age: 33
End: 2018-01-11
Payer: COMMERCIAL

## 2018-01-11 DIAGNOSIS — Z34.91 ENCOUNTER FOR SUPERVISION OF NORMAL PREGNANCY IN FIRST TRIMESTER: ICD-10-CM

## 2018-01-11 LAB
B-HCG SERPL-ACNC: 3399 MIU/ML
PROGEST SERPL-MCNC: 6.1 NG/ML

## 2018-01-11 PROCEDURE — 84144 ASSAY OF PROGESTERONE: CPT

## 2018-01-11 PROCEDURE — 36415 COLL VENOUS BLD VENIPUNCTURE: CPT

## 2018-01-11 PROCEDURE — 84702 CHORIONIC GONADOTROPIN TEST: CPT

## 2018-01-12 NOTE — MISCELLANEOUS
Message  patient called requesting HcG result  Result given      Active Problems    1  Abdominal pain, RUQ (789 01) (R10 11)   2  Abnormal blood sugar (790 29) (R73 09)   3  Bleeding after intercourse (626 7) (N93 0)   4  Chronic constipation (564 00) (K59 09)   5  Fatigue (780 79) (R53 83)   6  Female pelvic pain (625 9) (R10 2)   7  GERD (gastroesophageal reflux disease) (530 81) (K21 9)   8  Nausea with vomiting (787 01) (R11 2)   9  Otalgia, unspecified laterality (388 70) (H92 09)   10  Pregnancy with threatened  (640 00) (O20 0)   11  Premenstrual tension syndrome (625 4) (N94 3)   12  Spontaneous  (634 90) (O03 9)   13  Threatened miscarriage in early pregnancy (640 03) (O20 0)    Current Meds   1  No Reported Medications Recorded    Allergies    1  No Known Drug Allergies    2  No Known Environmental Allergies   3   No Known Food Allergies    Signatures   Electronically signed by : Adán Hussein RN; 2017 12:37PM EST                       (Author)

## 2018-01-13 ENCOUNTER — APPOINTMENT (EMERGENCY)
Dept: RADIOLOGY | Facility: HOSPITAL | Age: 33
End: 2018-01-13
Payer: COMMERCIAL

## 2018-01-13 ENCOUNTER — HOSPITAL ENCOUNTER (EMERGENCY)
Facility: HOSPITAL | Age: 33
Discharge: HOME | End: 2018-01-13
Attending: EMERGENCY MEDICINE
Payer: COMMERCIAL

## 2018-01-13 VITALS
SYSTOLIC BLOOD PRESSURE: 111 MMHG | RESPIRATION RATE: 18 BRPM | HEART RATE: 92 BPM | WEIGHT: 155 LBS | OXYGEN SATURATION: 97 % | TEMPERATURE: 98 F | HEIGHT: 57 IN | BODY MASS INDEX: 33.44 KG/M2 | DIASTOLIC BLOOD PRESSURE: 62 MMHG

## 2018-01-13 DIAGNOSIS — O20.9 VAGINAL BLEEDING BEFORE 22 WEEKS GESTATION: ICD-10-CM

## 2018-01-13 DIAGNOSIS — O03.9 COMPLETE ABORTION: Primary | ICD-10-CM

## 2018-01-13 LAB
B-HCG SERPL-ACNC: 1671 MIU/ML
BACTERIA UR QL AUTO: ABNORMAL /HPF
BILIRUB UR QL STRIP: NEGATIVE
CLARITY UR: ABNORMAL
COLOR UR: ABNORMAL
GLUCOSE UR STRIP-MCNC: NEGATIVE MG/DL
HGB UR QL STRIP.AUTO: ABNORMAL
KETONES UR STRIP-MCNC: ABNORMAL MG/DL
LEUKOCYTE ESTERASE UR QL STRIP: ABNORMAL
NITRITE UR QL STRIP: NEGATIVE
NON-SQ EPI CELLS URNS QL MICRO: ABNORMAL /HPF
PH UR STRIP.AUTO: 6 [PH] (ref 4.5–8)
PROT UR STRIP-MCNC: ABNORMAL MG/DL
RBC #/AREA URNS AUTO: ABNORMAL /HPF
SP GR UR STRIP.AUTO: 1.02 (ref 1–1.03)
UROBILINOGEN UR QL STRIP.AUTO: 0.2 E.U./DL
WBC #/AREA URNS AUTO: ABNORMAL /HPF

## 2018-01-13 PROCEDURE — 76815 OB US LIMITED FETUS(S): CPT

## 2018-01-13 PROCEDURE — 84702 CHORIONIC GONADOTROPIN TEST: CPT | Performed by: EMERGENCY MEDICINE

## 2018-01-13 PROCEDURE — 81001 URINALYSIS AUTO W/SCOPE: CPT | Performed by: EMERGENCY MEDICINE

## 2018-01-13 PROCEDURE — 99284 EMERGENCY DEPT VISIT MOD MDM: CPT

## 2018-01-13 PROCEDURE — 36415 COLL VENOUS BLD VENIPUNCTURE: CPT | Performed by: EMERGENCY MEDICINE

## 2018-01-13 PROCEDURE — 81002 URINALYSIS NONAUTO W/O SCOPE: CPT | Performed by: EMERGENCY MEDICINE

## 2018-01-13 NOTE — ED NOTES
Pt back from Ultrasound  Pt resting comfortably at this time with  at the bedside        Shayy Mo RN  01/13/18 4088

## 2018-01-13 NOTE — ED PROVIDER NOTES
History  Chief Complaint   Patient presents with    Vaginal Bleeding - Pregnant     Patient states vaginal bleed since last night  bright red blood with clots  31-year-old female  at approximately 9 weeks gestational age via 1st trimester transvaginal ultrasound who is presenting with vaginal bleeding and passage of products of conception  Patient states that she started with vaginal bleeding yesterday evening  Initially, it was brownish in color  Patient then began to pass clots  While she was in the emergency department, patient passed a large amount of what appeared to be products of conception  Prior to this, patient felt mild pelvic cramping but stated this resolved  Of note, patient has been followed by the [facility] resident clinic  There are several notes from early January and the end of December which indicated the patient had a declining quantitative HCG and that the current pregnancy might not be viable  Patient states that she was instructed to present to the ED with any vaginal bleeding  Per review of records, patient had an early 1st trimester spontaneous  in 2017  Review of systems is otherwise negative  Patient denies fever, chills, diaphoresis, unintentional weight loss, headache, vision changes, extremity numbness or weakness, chest pain or pressure, palpitations, shortness of breath, cough, nausea, vomiting, abdominal pain, diarrhea, constipation, melena, hematochezia, dysuria, urinary frequency, and hematuria  Plan: This likely represents a complete   Inspection of the tissue passed by the patient in the emergency department was consistent with products of conception  We will obtain a repeat quantitative beta HCG and urinalysis  We will order a formal ultrasound to evaluate for retained products of conception  OB  was contacted and we discussed the case as noted in the ED Course section    Patient has a follow-up appointment for this Monday with her OB               Prior to Admission Medications   Prescriptions Last Dose Informant Patient Reported? Taking? Prenatal MV-Min-Fe Fum-FA-DHA (PRENATAL 1 PO)   Yes No   Sig: Take by mouth      Facility-Administered Medications: None       Past Medical History:   Diagnosis Date    Pre-diabetes        History reviewed  No pertinent surgical history  History reviewed  No pertinent family history  I have reviewed and agree with the history as documented  Social History   Substance Use Topics    Smoking status: Never Smoker    Smokeless tobacco: Never Used    Alcohol use No        Review of Systems   Constitutional: Negative for diaphoresis, fever and unexpected weight change  HENT: Negative for congestion, rhinorrhea and sore throat  Eyes: Negative for pain, discharge and visual disturbance  Respiratory: Negative for cough, shortness of breath and wheezing  Cardiovascular: Negative for chest pain, palpitations and leg swelling  Gastrointestinal: Negative for abdominal pain, blood in stool, constipation, diarrhea, nausea and vomiting  Genitourinary: Positive for vaginal bleeding  Negative for dysuria, flank pain and hematuria  Musculoskeletal: Negative for arthralgias and joint swelling  Skin: Negative for rash and wound  Allergic/Immunologic: Negative for environmental allergies and food allergies  Neurological: Negative for dizziness, seizures, weakness and numbness  Hematological: Negative for adenopathy  Psychiatric/Behavioral: Negative for confusion and hallucinations         Physical Exam  ED Triage Vitals [01/13/18 1150]   Temperature Pulse Respirations Blood Pressure SpO2   98 °F (36 7 °C) 91 18 136/70 97 %      Temp Source Heart Rate Source Patient Position - Orthostatic VS BP Location FiO2 (%)   Oral Monitor Sitting Left arm --      Pain Score       Worst Possible Pain           Orthostatic Vital Signs  Vitals:    01/13/18 1150 01/13/18 1500   BP: 136/70 111/62   Pulse: 91 92 Patient Position - Orthostatic VS: Sitting Sitting       Physical Exam   Constitutional: She is oriented to person, place, and time  She appears well-developed and well-nourished  No distress  HENT:   Head: Normocephalic and atraumatic  Right Ear: External ear normal    Left Ear: External ear normal    Eyes: Conjunctivae and EOM are normal  Pupils are equal, round, and reactive to light  Cardiovascular: Normal rate, regular rhythm and normal heart sounds  No murmur heard  Pulmonary/Chest: Effort normal and breath sounds normal  No respiratory distress  She has no wheezes  She has no rales  Abdominal: Soft  Bowel sounds are normal  She exhibits no distension  There is no tenderness  There is no guarding  Musculoskeletal: Normal range of motion  She exhibits no deformity  Neurological: She is alert and oriented to person, place, and time  Skin: Skin is warm and dry  Capillary refill takes less than 2 seconds  Psychiatric: She has a normal mood and affect  Her behavior is normal  Thought content normal    Nursing note and vitals reviewed              ED Medications  Medications - No data to display    Diagnostic Studies  Results Reviewed     Procedure Component Value Units Date/Time    Urine Microscopic [[id number]]  (Abnormal) Collected:  01/13/18 1427    Lab Status:  Final result Specimen:  Urine from Urine, Other Updated:  01/13/18 1643     RBC, UA Innumerable (A) /hpf      WBC, UA 2-4 (A) /hpf      Epithelial Cells Occasional /hpf      Bacteria, UA Moderate (A) /hpf     UA  w Reflex to Microscopic [[id number]]  (Abnormal) Collected:  01/13/18 1427    Lab Status:  Final result Specimen:  Urine from Urine, Other Updated:  01/13/18 1552     Color, UA Dk Yellow     Clarity, UA Cloudy     Specific [place], UA 1 019     pH, UA 6 0     Leukocytes, UA Small (A)     Nitrite, UA Negative     Protein, UA 30 (1+) (A) mg/dl      Glucose, UA Negative mg/dl      Ketones, UA Trace (A) mg/dl      Urobilinogen, UA 0 2 E U /dl      Bilirubin, UA Negative     Blood, UA Large (A)    hCG, quantitative [[id number]]  (Abnormal) Collected:  18 1358    Lab Status:  Final result Specimen:  Blood from Arm, Left Updated:  18 1538     HCG, Quant 1,671 (H) mIU/mL     Narrative:          Expected Ranges:     Approximate               Approximate HCG  Gestation age          Concentration ( mIU/mL)  _____________          ______________________   Charanjit Tacho                      HCG values  0 2-1                       5-50  1-2                           2-3                         100-5000  3-4                         500-83864  4-5                         1000-07847  5-6                         [id number]  6-8                         [id number]  8-12                        [id number]    POCT urinalysis dipstick [[id number]]  (Abnormal) Resulted:  18 1427    Lab Status:  Final result Specimen:  Urine Updated:  18 1427                  OB pregnancy limited with transvaginal   Final Result by Salazar Hamm MD ( 1543)   No intrauterine gestation or adnexal mass identified  There was a gestational sac on the prior study  The endometrium is complex and thickened with hypervascularity  Findings likely represent aborted pregnancy with retained products of conception  Workstation performed: [id number]               Procedures  Procedures      Phone Consults  ED Phone Contact    ED Course  ED Course as of 831   Sat 2018   1253 Blood Pressure: 136/70   1254 Temperature: 98 °F (36 7 °C)   1254 Pulse: 91   1254 Respirations: 18   1254 SpO2: 97 %   1254 Triage vitals noted  Unremarkable  56 Was informed by patient's nurse that the patient had passed a large clot  The nurse collected this specimen and placed in a cup  On inspection, this appears to be products of conception  Therefore, it is likely that patient has experienced a complete   0 OB resident paged      [address] Spoke with Pastor Angelo of OB  Discussed the case thoroughly  Recommend we obtain a quantitative beta HCG while in the department and write a prescription for a repeat quantitative beta HCG for 48 hours from now  Patient also will follow up with the OB resident clinic on Monday morning as scheduled  [phone] ED point-of-care transabdominal OB ultrasound was performed  Demonstrated uterus with indistinct endometrial stripe  No clear retained products of conception were visualized  We will obtain a formal ultrasound  [phone] with Radiology regarding read for transvaginal ultrasound  They state that this will not be able to be read until ultrasound tech finalize study  I then spoke with the ultrasound tech who stated that he would finalize study  If this study is not within 30 minutes, I will again speak with Radiology  1541 HCG QUANTITATIVE: (!) 1,707   1541 OB resident paged  [address] with OB resident  We reviewed the results of the transvaginal ultrasound  OB resident is not concerned regarding retained products of conception at this time  D&C is not indicated at this time  Instead, patient will follow-up as scheduled at the [facility] clinic     4851 Urinalysis demonstrates small leukocytes, 1+ proteinuria, trace ketones, and large blood  Microscopic analysis pending  MDM  Number of Diagnoses or Management Options  Complete :   Vaginal bleeding before 22 weeks gestation:   Diagnosis management comments:   28-year-old  at approximately 7 weeks gestation presenting with mild pelvic cramping, vaginal bleeding, passage of clots, and passage of products of conception  This likely represents a complete   1   Complete :  As detailed above, patient presented with mild pelvic cramping with passage of clots  She then passed what appeared to be products of conception while in the ED  A picture of this is included in the chart above    Unfortunately, patient also had a spontaneous  in 2017  Repeat quantitative HCG was 1,671 which is decreased from [address] on   This is consistent with spontaneous   Transvaginal ultrasound was obtained  This demonstrated thickened endometrium which was highly vascular  Radiology read this as possible retained products of conception  As detailed above, I reviewed the case with OB residents  We also reviewed the ultrasound and they were not concerned for retained products of conception  They advised that patient be provided with a prescription for repeat quantitative HCG for 48 hours  Patient does have a follow-up appointment with her OB on   I provided the patient with prescription for repeat quantitative HCG and advised her to keep her follow-up appointment  Further instructed patient to obtain the quantitative HCG prior to her appointment if possible  Patient and her  understand and agree with the plan         Amount and/or Complexity of Data Reviewed  Clinical lab tests: ordered and reviewed  Tests in the radiology section of CPT®: ordered and reviewed  Decide to obtain previous medical records or to obtain history from someone other than the patient: yes  Obtain history from someone other than the patient: yes  Review and summarize past medical records: yes  Discuss the patient with other providers: yes  Independent visualization of images, tracings, or specimens: yes    Risk of Complications, Morbidity, and/or Mortality  Presenting problems: low  Diagnostic procedures: minimal  Management options: minimal    Patient Progress  Patient progress: stable    CritCare Time    Disposition  Final diagnoses:   Complete    Vaginal bleeding before 22 weeks gestation     Time reflects when diagnosis was documented in both MDM as applicable and the Disposition within this note     Time User Action Codes Description Comment    2018  3:51 PM Emily Moreno Add [O03 9] Complete      2018  3:51 PM Lum Landau Add [O20 9] Vaginal bleeding before 22 weeks gestation       ED Disposition     ED Disposition Condition Comment    Discharge  [facility] discharge to home/self care  Condition at discharge: Good        Follow-up Information     Follow up With Specialties Details Why Contact Jan Colmenares  Go in 2 days Please keep scheduled appointment for   [address]  [phone]          Discharge Medication List as of 2018  3:54 PM      CONTINUE these medications which have NOT CHANGED    Details   Prenatal MV-Min-Fe Fum-FA-DHA (PRENATAL 1 PO) Take by mouth, Historical Med             Outpatient Discharge Orders  hCG, quantitative   Standing Status: Future  Standing Exp  Date: 19         ED Provider  Attending physically available and evaluated [facility]  I managed the patient along with the ED Attending      Electronically Signed by         Sherin Monet MD  18 [id number]

## 2018-01-13 NOTE — ED ATTENDING ATTESTATION
Donnell Dudley DO, saw and evaluated the patient  I have discussed the patient with the resident/non-physician practitioner and agree with the resident's/non-physician practitioner's findings, Plan of Care, and MDM as documented in the resident's/non-physician practitioner's note, except where noted  All available labs and Radiology studies were reviewed  At this point I agree with the current assessment done in the Emergency Department  I have conducted an independent evaluation of this patient a history and physical is as follows:    28 yof, , 7-8 weeks gestation, with vag bleeding and clots last night, passed fetal tissue today  This has been an ongoing process followed as outpt with downtrending HCG  Cramping earlier but no cramping now  /70   Pulse 91   Temp 98 °F (36 7 °C) (Oral)   Resp 18   Ht 4' 9" (1 448 m)   Wt 70 3 kg (155 lb)   LMP 2017   SpO2 97%   BMI 33 54 kg/m²   NAD  abd soft, NT  Ext no edema    Will US to r/o retained products, trend HCG for outpt f/u UA to screen for proteinuria     Pt is Rh positive and does not require Rhogam        Dx   Spontaneous         Critical Care Time  CritCare Time    Procedures

## 2018-01-13 NOTE — DISCHARGE INSTRUCTIONS
You were seen for a miscarriage or spontaneous   We will provide with a prescription for a repeat quantitative beta HCG  Please have this done on Monday morning prior to your OB appointment  Please keep her scheduled OB appointment for   Please read the below discharge instructions  Miscarriage   WHAT YOU NEED TO KNOW:   A miscarriage is the loss of a fetus within the first 20 weeks of pregnancy  A miscarriage may also be called a spontaneous  or an early pregnancy loss  DISCHARGE INSTRUCTIONS:   Return to the emergency department if:   · You have foul-smelling drainage or pus coming from your vagina  · You have heavy vaginal bleeding and soak 1 pad or more in an hour  · You have severe abdominal pain  · You feel like your heart is beating faster than normal      · You feel extremely weak or dizzy  Contact your healthcare provider if:   · You have a fever greater than 100 4°F or chills  · You have extreme sadness, grief, or feel unable to cope with what has happened  · You have questions or concerns about your condition or care  Self-care:   · Do not put anything in your vagina for 2 weeks or as directed  Do not use tampons, douche, or have sex  These actions can cause infection and pain  · Use sanitary pads as needed  You may have light bleeding or spotting for 2 weeks  · Do not take a bath or go swimming for 2 weeks or as directed  These actions may increase your risk for an infection  Take showers only  · Rest as needed  Slowly start to do more each day  Return to your daily activities as directed  · Talk to your healthcare provider about birth control  If you would like to prevent another pregnancy, ask your healthcare provider which type of birth control is best for you  · Join a support group or therapy to help you cope  A miscarriage may be very difficult for you, your partner, and other members of your family   There is no right way to feel after a miscarriage  You may feel overwhelming grief or other emotions  It may be helpful to talk to a friend, family member, or counselor about your feelings  You may worry that you could have another miscarriage  Talk to your healthcare provider about your concerns  He may be able to help you reduce the risk for another miscarriage  He may also help you find ways to cope with grief  For more information:   · [employer]  [address] [place] [address]  Phone: [phone]  Phone: 5- [phone]  Web Address: http://[employer]/  [employer]  · [facility]  [address] [facility] , [address] [facility]  Web Address: [employer]  Follow up with your healthcare provider as directed: You may need to see your healthcare provider for blood tests or an ultrasound  Write down your questions so you remember to ask them during your visits  © 2017 [address] [employer]  Information is for End User's use only and may not be sold, redistributed or otherwise used for commercial purposes  All illustrations and images included in CareNotes® are the copyrighted property of A D A M , Inc  or Sumanth Ortega  The above information is an  only  It is not intended as medical advice for individual conditions or treatments  Talk to your doctor, nurse or pharmacist before following any medical regimen to see if it is safe and effective for you

## 2018-01-14 VITALS
HEIGHT: 57 IN | DIASTOLIC BLOOD PRESSURE: 80 MMHG | BODY MASS INDEX: 30.85 KG/M2 | SYSTOLIC BLOOD PRESSURE: 122 MMHG | WEIGHT: 143 LBS

## 2018-01-15 ENCOUNTER — APPOINTMENT (OUTPATIENT)
Dept: LAB | Facility: HOSPITAL | Age: 33
End: 2018-01-15
Attending: EMERGENCY MEDICINE

## 2018-01-15 ENCOUNTER — TRANSCRIBE ORDERS (OUTPATIENT)
Dept: LAB | Facility: HOSPITAL | Age: 33
End: 2018-01-15

## 2018-01-15 ENCOUNTER — ALLSCRIPTS OFFICE VISIT (OUTPATIENT)
Dept: OTHER | Facility: OTHER | Age: 33
End: 2018-01-15

## 2018-01-15 DIAGNOSIS — O03.9 COMPLETE ABORTION: ICD-10-CM

## 2018-01-15 DIAGNOSIS — R73.09 OTHER ABNORMAL GLUCOSE: ICD-10-CM

## 2018-01-15 DIAGNOSIS — E28.2 POLYCYSTIC OVARIAN SYNDROME: ICD-10-CM

## 2018-01-15 DIAGNOSIS — O20.9 VAGINAL BLEEDING BEFORE 22 WEEKS GESTATION: ICD-10-CM

## 2018-01-15 DIAGNOSIS — O03.9 COMPLETE OR UNSPECIFIED SPONTANEOUS ABORTION WITHOUT COMPLICATION: ICD-10-CM

## 2018-01-15 DIAGNOSIS — R53.83 OTHER FATIGUE: ICD-10-CM

## 2018-01-15 LAB — B-HCG SERPL-ACNC: 437 MIU/ML

## 2018-01-15 PROCEDURE — 36415 COLL VENOUS BLD VENIPUNCTURE: CPT

## 2018-01-15 PROCEDURE — 84702 CHORIONIC GONADOTROPIN TEST: CPT

## 2018-01-15 NOTE — MISCELLANEOUS
Message  Called patient back re: hcg level of 25 from 43  Patient still has left sided pelvic pain  Will have patient get a TVUS today at 2pm to rule out ectopic  Signatures   Electronically signed by :  Mercedes Jimenez DO; Oct 10 2017  9:18AM EST                       (Author)

## 2018-01-16 NOTE — PROGRESS NOTES
Assessment    1  Fatigue (780 79) (R53 83)   2  Abnormal blood sugar (790 29) (R73 09)   3  Encounter for preventive health examination (V70 0) (Z00 00)    Plan  Abnormal blood sugar    · (1) HEMOGLOBIN A1C; Status:Active; Requested for:18Oct2017;    · (1) LIPID PANEL, FASTING; Status:Active; Requested for:18Oct2017; Abnormal blood sugar, Fatigue    · (1) TSH; Status:Active; Requested for:18Oct2017;   Fatigue    · (1) BASIC METABOLIC PROFILE; Status:Active; Requested for:18Oct2017;    · (1) CBC/PLT/DIFF; Status:Active; Requested for:18Oct2017;    · (1) VITAMIN D 25-HYDROXY; Status:Active; Requested for:18Oct2017;     Discussion/Summary  health maintenance visit Currently, she eats a healthy diet  the risks and benefits of cervical cancer screening were discussed Screening lab work includes hemoglobin, lipid profile, thyroid function testing and 25-hydroxyvitamin D  Advice and education were given regarding nutrition, aerobic exercise and vitamin D supplements  1  Fatigue: In the setting of recent miscarriage patient denies any mood disorder  Will order basic blood work  2  Insulin resistance: Unsure of A1c or fasting blood sugars, patient was started on metformin 500 mg b i d  in [place] patient unsure why she was started on medications, she has a strong family history of Diabetes in her family but all the history has been with late onset diabetes  No medication changes were made at this visit  3) Weight Gain: will get TSH  Patient follo up with OBGYN for her PAP  The patient was counseled regarding prognosis, patient and family education, impressions  Possible side effects of new medications were reviewed with the patient/guardian today  The treatment plan was reviewed with the patient/guardian   The patient/guardian understands and agrees with the treatment plan     Self Referrals: No      Chief Complaint  Well visit      History of Present Illness  HPI: Patient in the office for physical  Patient was started on metformin 6 months back in [place] ([facility]) for Insulin resistance , she is unsure of her blood glucose levels or her A1c  She is unsure if he Metformin was started for her diagnosis of PCOS  Patient recently had a miscarriage and has donna following with BELLE  She complaints of fatigue and feeling tired, has occasional abdominal pain but denies any fevers any vaginal discharge  Her appetite and mood has been good  Patient complains of weight gain the in the past six months  The patient denies of any the chest pain, shortness of breath, nausea or vomiting  Past medical, social, medications, surgical history reviewed           Review of Systems    Constitutional: feeling tired, but no fever, not feeling poorly and no chills  ENT: no earache, no nosebleeds, no hearing loss and no nasal discharge  Cardiovascular: no chest pain and no palpitations  Respiratory: no shortness of breath, no cough, no wheezing and no shortness of breath during exertion  Gastrointestinal: no abdominal pain and no constipation  Integumentary: no rashes and no skin lesions  Neurological: no headache, no numbness, no confusion and no dizziness  Active Problems    1  Abdominal pain, RUQ (789 01) (R10 11)   2  Bleeding after intercourse (626 7) (N93 0)   3  Chronic constipation (564 00) (K59 09)   4  Fatigue (780 79) (R53 83)   5  Female pelvic pain (625 9) (R10 2)   6  GERD (gastroesophageal reflux disease) (530 81) (K21 9)   7  Nausea with vomiting (787 01) (R11 2)   8  Otalgia, unspecified laterality (388 70) (H92 09)   9  Pregnancy with threatened  (640 00) (O20 0)   10  Premenstrual tension syndrome (625 4) (N94 3)   11   Spontaneous  (634 90) (O03 9)    Past Medical History    · History of Acute otitis media, unspecified laterality   · History of polycystic ovarian syndrome (V13 29) (Z87 42)   · History of vaginal discharge (V13 29) (Z87 42)    Surgical History    · Denied: History of Recent Surgery    Family History  Mother    · Family history of Diabetes Mellitus (V18 0)   · Family history of diabetes mellitus (V18 0) (Z83 3)   · Family history of migraine headaches (V17 2) (Z82 0)   · Family history of thyroid disease (V18 19) (Z83 49)   · Family history of Headache  Sister    · Family history of thyroid disease (V18 19) (Z83 49)  Maternal Grandmother    · Family history of diabetes mellitus (V18 0) (Z83 3)   · Family history of thyroid disease (V18 19) (Z83 49)  Maternal Aunt    · Family history of thyroid disease (V18 19) (Z83 49)  Family History    · Denied: Family history of Colon cancer   · Denied: Family history of Crohn disease   · Denied: Family history of Liver disease   · Family history of Melanoma    Social History    · Denied: History of Alcohol Use (History)   · Denied: History of Drug Use   ·    · Never A Smoker   · Sexually active   · Uses Safety Equipment - Seatbelts    Current Meds   1  MetFORMIN HCl - 500 MG Oral Tablet; TAKE 1 TABLET TWICE DAILY WITH FOOD; Therapy: 07KRH4360 to Recorded    Allergies    1  No Known Drug Allergies    2  No Known Environmental Allergies   3  No Known Food Allergies    Vitals   Recorded: 90ZOQ6558 08:55AM   Temperature 98 1 F   Heart Rate 78   Respiration 16   Systolic 691   Diastolic 70   Height 4 ft 11 5 in   Weight 151 lb 8 oz   BMI Calculated 30 09   BSA Calculated 1 65   Pain Scale 4     Physical Exam    Constitutional   General appearance: No acute distress, well appearing and well nourished  Eyes   Conjunctiva and lids: No swelling, erythema or discharge  Pupils and irises: Equal, round, reactive to light  Ears, Nose, Mouth, and Throat   External inspection of ears and nose: Normal     Hearing: Normal     Nasal mucosa, septum, and turbinates: Normal without edema or erythema  Lips, teeth, and gums: Normal, good dentition  Oropharynx: Normal with no erythema, edema, exudate or lesions      Neck   Neck: Supple, symmetric, trachea midline, no masses  Thyroid: Normal, no thyromegaly  Pulmonary   Respiratory effort: No increased work of breathing or signs of respiratory distress  Cardiovascular   Auscultation of heart: Normal rate and rhythm, normal S1 and S2, no murmurs  Abdomen   Abdomen: Non-tender, no masses  Liver and spleen: No hepatomegaly or splenomegaly  Examination for hernias: No hernia appreciated  Lymphatic   Palpation of lymph nodes in neck: No lymphadenopathy  Musculoskeletal   Gait and station: Normal     Skin   Skin and subcutaneous tissue: Normal without rashes or lesions  Attending Note  Attending Note: Level of Participation: I was present in clinic, but did not examine the patient  I agree with the Resident's note  Future Appointments    Date/Time Provider Specialty Site   11/01/2017 08:15 AM DAVINA Brand Obstetrics/Gynecology [facility]     Signatures   Electronically signed by : NYDIA Dunn ; Oct 24 2017 11:46AM EST                       (Author)    Electronically signed by :  Lynette Ibarra MD; Oct 24 2017  4:52PM EST                       (Author)

## 2018-01-16 NOTE — PROGRESS NOTES
Assessment   1  Spontaneous  (634 90) (O03 9)   2  Polycystic ovaries (256 4) (E28 2)    Plan   Abnormal blood sugar    · (1) CBC/PLT/DIFF; Status:Active; Requested EAR:29VSL4366; Perform:[facility]; CUT:48WPN6530;CAZXYVA; For:Abnormal blood sugar; Ordered By:Sil Fernando;   · (1) HEMOGLOBIN A1C; Status:Active; Requested FFI:[id number]; Perform:[facility]; LKO:22CBD2790;HOHWNWI; For:Abnormal blood sugar; Ordered By:Sil Fernando; Fatigue, Polycystic ovaries    · (1) T4, FREE; Status:Active; Requested RZJ:57YOQ6052; Perform:[facility]; BRW:92FUJ3850;LTJPXIJ; For:Fatigue, Polycystic ovaries; Ordered By:Sil Fernando;   · (1) TSH; Status:Active; Requested CIY:72SVG5156; Perform:[facility]; ICM:66XLF0499;FBEOTEI; For:Fatigue, Polycystic ovaries; Ordered By:Sil Fernando; Polycystic ovaries    · (1) COMPREHENSIVE METABOLIC PANEL; Status:Active; Requested VRT:[id number]; Perform:[facility]; XKE:[id number];OLGMXUU; For:Polycystic ovaries; Ordered By:Sil Fernando;   · (1) DHEA-S; Status:Active; Requested NEE:89MYV3164; Perform:[facility]; MXB:44NFL7557;LEJPCLK; For:Polycystic ovaries; Ordered By:Sil Fernando;   · (1) INSULIN; Status:Active; Requested EAM:48GPD1401; Perform:[facility]; DCR:[id number];OJRHKSM; For:Polycystic ovaries; Ordered By:Sil Fernando;   · (1) PROGESTERONE; Status:Active; Requested TRM:[id number]; Perform:[facility]; SFO:11WVR9850;PEUFDPS; For:Polycystic ovaries; Ordered By:Sil Fernando;   · (1) TESTOSTERONE, FREE (DIRECT) AND TOTAL; Status:Active; Requested    JLI:05YXE5922; Perform:[facility]; TPT:87KUV0044;SEXQNGL; For:Polycystic ovaries; Ordered By:Sil Fernando;  Spontaneous     · (1) HCG QUANT; Status:Active; Requested MJM:[id number]; Perform:[facility]; QPA:11XGH1402;BXWBYMD; For:Spontaneous ; Ordered By:Abdullahi Carry Lyubov; Discussion/Summary   Discussion Summary:    R/w pt cycles and PCOS and fertility  [employer] today down to 447 from [phone] over the weekend  BLeeding has slowed to a light flow  At this point pt will plan quant in 7-10 days, will then plan day 21 prog along w fasting insulin and PCOS BW to see if pt may benefit from addition of Metformin  Used years ago, but has not been on Metformin since she moved to the [facility] approx 6 years ago  Goals and Barriers: The patient has the current Goals: Pregnancy  The patent has the current Barriers: None  Patient's Capacity to Self-Care: Patient is able to Self-Care  Chief Complaint   Chief Complaint Free Text Note Form: Pt here today as f/u to pregnancy loss      History of Present Illness   HPI: Pt here today as f/u to pregnancy loss  LMP 17  Had decrease in quant and prog 6 1 at last check  bleeding this weekend and HCG was down to approx 1600, then went again this am  Was seen in ER w confirmed loss   SAB October was approx 6 weeks along, then again this time approx 5-6 weeks as well   does have h/o PCOS in the past but cycles have been normal comes approx 30 days   and recently sexually active in last 5 mths  Active Problems   1  Abnormal blood sugar (790 29) (R73 09)   2  Amenorrhea (626 0) (N91 2)   3  Chronic constipation (564 00) (K59 09)   4  Currently pregnant in first trimester with unknown gestational age (V22 2) (Z34 91)   5  Fatigue (780 79) (R53 83)   6  GERD (gastroesophageal reflux disease) (530 81) (K21 9)    Past Medical History   1  History of Acute otitis media, unspecified laterality    Surgical History   1  Denied: History of Recent Surgery    Family History   Mother    1  Family history of Diabetes Mellitus (V18 0)   2  Family history of diabetes mellitus (V18 0) (Z83 3)   3  Family history of migraine headaches (V17 2) (Z82 0)   4  Family history of PCOS (V18 7) (Z84 2)   5   Family history of thyroid disease (V18 19) (Z83 49)   6  Family history of Headache  Sister    7  Family history of thyroid disease (V18 19) (Z83 49)  Maternal Grandmother    8  Family history of diabetes mellitus (V18 0) (Z83 3)   9  Family history of thyroid disease (V18 19) (Z83 49)  Maternal Aunt    10  Family history of thyroid disease (V18 19) (Z83 49)  Family History    11  Denied: Family history of Colon cancer   12  Denied: Family history of Crohn disease   13  Denied: Family history of Liver disease   14  Family history of Melanoma    Social History    · Denied: History of Alcohol Use (History)   · Denied: History of Drug Use   ·    · Never A Smoker   · Sexually active   · Uses Safety Equipment - Seatbelts    Current Meds    1  Prenatal Complete TABS; Therapy: (Recorded:20Ivy2082) to Recorded    Allergies   1  No Known Drug Allergies  2  No Known Environmental Allergies   3  No Known Food Allergies    Vitals   Vital Signs    Recorded: 08NEM2046 84:83OG   Systolic 187, LUE, Sitting   Diastolic 80, LUE, Sitting   Height 4 ft 11 in   Weight 160 lb    BMI Calculated 32 32   BSA Calculated 1 68     Physical Exam        Constitutional      General appearance: No acute distress, well appearing and well nourished  Neck      Neck: Normal, supple, trachea midline, no masses  Thyroid: Normal, no thyromegaly  Pulmonary      Respiratory effort: No increased work of breathing or signs of respiratory distress  Auscultation of lungs: Clear to auscultation  Cardiovascular      Auscultation of heart: Normal rate and rhythm, normal S1 and S2, no murmurs  Peripheral vascular exam: Normal pulses Throughout         Skin      Palpation of skin and subcutaneous tissue: Normal        Psychiatric      Orientation to person, place, and time: Normal        Attending Note   Collaborating Physician Note: Collaborating Physician: I discussed the case with the Advanced Practitioner and reviewed the note,-- I supervised the Advanced Practitioner-- and-- I agree with the Advanced Practitioner note        Signatures    Electronically signed by : BRET Woo; Leo 15 2018  1:36PM EST                       (Author)     Electronically signed by : NYDIA Collier ; Leo 15 2018  6:43PM EST                       (Author)

## 2018-01-16 NOTE — MISCELLANEOUS
Message  CALLED PATIENT TO RESCHEDULE MISSED APPT  UNABLE TO SPEAK TO PATIENT LEFT MESSAGE ON VOICEMAIL  Active Problems    1  Abdominal pain, RUQ (789 01) (R10 11)   2  Abnormal blood sugar (790 29) (R73 09)   3  Bleeding after intercourse (626 7) (N93 0)   4  Chronic constipation (564 00) (K59 09)   5  Fatigue (780 79) (R53 83)   6  Female pelvic pain (625 9) (R10 2)   7  GERD (gastroesophageal reflux disease) (530 81) (K21 9)   8  Nausea with vomiting (787 01) (R11 2)   9  Otalgia, unspecified laterality (388 70) (H92 09)   10  Pregnancy with threatened  (640 00) (O20 0)   11  Premenstrual tension syndrome (625 4) (N94 3)   12  Spontaneous  (634 90) (O03 9)   13  Threatened miscarriage in early pregnancy (640 03) (O20 0)    Current Meds   1  No Reported Medications Recorded    Allergies    1  No Known Drug Allergies    2  No Known Environmental Allergies   3   No Known Food Allergies    Signatures   Electronically signed by : Mónica Hernandez, ; 2017  9:40AM EST                       (Author)

## 2018-01-22 VITALS
WEIGHT: 146 LBS | BODY MASS INDEX: 31.5 KG/M2 | DIASTOLIC BLOOD PRESSURE: 84 MMHG | HEIGHT: 57 IN | SYSTOLIC BLOOD PRESSURE: 122 MMHG

## 2018-01-22 VITALS
SYSTOLIC BLOOD PRESSURE: 108 MMHG | BODY MASS INDEX: 29.74 KG/M2 | TEMPERATURE: 98.1 F | HEIGHT: 60 IN | WEIGHT: 151.5 LBS | HEART RATE: 78 BPM | DIASTOLIC BLOOD PRESSURE: 70 MMHG | RESPIRATION RATE: 16 BRPM

## 2018-01-22 VITALS
WEIGHT: 149 LBS | HEIGHT: 60 IN | BODY MASS INDEX: 29.25 KG/M2 | SYSTOLIC BLOOD PRESSURE: 102 MMHG | HEART RATE: 74 BPM | DIASTOLIC BLOOD PRESSURE: 68 MMHG

## 2018-01-23 VITALS
WEIGHT: 160 LBS | HEIGHT: 59 IN | DIASTOLIC BLOOD PRESSURE: 80 MMHG | BODY MASS INDEX: 32.25 KG/M2 | SYSTOLIC BLOOD PRESSURE: 120 MMHG

## 2018-01-23 NOTE — MISCELLANEOUS
Message  Message Free Text Note Form:   Reviewed HCG Quants with patient  LMP per pt 11/2/17: Inaccurate since her HCG was <2 on 11/27/17 11/27/17: HCG <2  12/12: 375  12/14: 896  12/29: 6896    Approximately 5-7 doubling of her HCQ Quant is expected from 12/14 (896)  However, her HCG Hung Ritesh is now 0383 which is about 3 doublings  I informed patient to remain watchful for pelvic pain, bleeding, or signs of miscarriage  Of note, her in-office TVUS scan on 12/29/17 showed a possible EGA of 5w0d based on gestational sac measurements which coincides with her negative HCG Quant of 11/27/17  Also, her progesterone level is down from 30 to 14, which indicates viable value to an equivocal value  The progesterone level was not communicated to the patient at this time  She will return to clinic on 1/12/17 for a repeat viability scan          Signatures   Electronically signed by : NYDIA Houser ; Dec 30 2017 11:48AM EST                       (Author)    Electronically signed by : Josue Suarez MD; Jan 2 2018 10:06AM EST                       (Author)

## 2018-01-23 NOTE — MISCELLANEOUS
Message  Called patient with HCG and progesterone results  Pt informed of reassuring testing  In review of her chart, she had an HCG level drawn n 11/27 which was negative which does not coincide with an LMP of 11/2  Pt advised she is just a lot earlier in the pregnancy than expected  Pt was encourged to to cont PNV, hydration, physical activity and a healthy diet  She asked if intercourse was still allowed  She was told that she can cont to be sexually active  She was asked to call and make an appt sooner than 3 weeks if she has any pelvic pain, cramping, or bleeding  Will task team to call pt early next week and schedule viability scan in about 3 weeks  Will do PN panel after viability confirmed as pt only about 2-3 weeks pregnant        Signatures   Electronically signed by : NYDIA Rodriguez ; Dec 15 2017 12:37PM EST                       (Author)

## 2018-01-24 VITALS
WEIGHT: 159 LBS | HEART RATE: 92 BPM | HEIGHT: 59 IN | BODY MASS INDEX: 32.05 KG/M2 | SYSTOLIC BLOOD PRESSURE: 133 MMHG | DIASTOLIC BLOOD PRESSURE: 83 MMHG

## 2018-01-24 VITALS
BODY MASS INDEX: 31.65 KG/M2 | HEIGHT: 59 IN | WEIGHT: 157 LBS | DIASTOLIC BLOOD PRESSURE: 85 MMHG | SYSTOLIC BLOOD PRESSURE: 127 MMHG

## 2018-01-24 VITALS
SYSTOLIC BLOOD PRESSURE: 123 MMHG | DIASTOLIC BLOOD PRESSURE: 82 MMHG | HEIGHT: 60 IN | WEIGHT: 155 LBS | BODY MASS INDEX: 30.43 KG/M2

## 2018-02-19 ENCOUNTER — TELEPHONE (OUTPATIENT)
Dept: OBGYN CLINIC | Facility: CLINIC | Age: 33
End: 2018-02-19

## 2018-02-19 NOTE — TELEPHONE ENCOUNTER
Reviewed with Boris Martínez that we really do need to follow hcg quant back down to 0 before she should go for day 21 labs  She is going to go for bw this week, keep an eye out for results to come in

## 2018-03-05 ENCOUNTER — TRANSCRIBE ORDERS (OUTPATIENT)
Dept: ADMINISTRATIVE | Age: 33
End: 2018-03-05

## 2018-03-05 ENCOUNTER — APPOINTMENT (OUTPATIENT)
Dept: LAB | Age: 33
End: 2018-03-05
Payer: COMMERCIAL

## 2018-03-05 DIAGNOSIS — O03.9 SPONTANEOUS ABORTION IN FIRST TRIMESTER: Primary | ICD-10-CM

## 2018-03-05 DIAGNOSIS — O03.9 SPONTANEOUS ABORTION IN FIRST TRIMESTER: ICD-10-CM

## 2018-03-05 LAB — B-HCG SERPL-ACNC: <2 MIU/ML

## 2018-03-05 PROCEDURE — 36415 COLL VENOUS BLD VENIPUNCTURE: CPT

## 2018-03-05 PROCEDURE — 84702 CHORIONIC GONADOTROPIN TEST: CPT

## 2018-04-10 ENCOUNTER — APPOINTMENT (OUTPATIENT)
Dept: LAB | Age: 33
End: 2018-04-10
Payer: COMMERCIAL

## 2018-04-10 ENCOUNTER — TRANSCRIBE ORDERS (OUTPATIENT)
Dept: ADMINISTRATIVE | Age: 33
End: 2018-04-10

## 2018-04-10 DIAGNOSIS — R53.83 OTHER FATIGUE: ICD-10-CM

## 2018-04-10 DIAGNOSIS — R73.09 OTHER ABNORMAL GLUCOSE: ICD-10-CM

## 2018-04-10 DIAGNOSIS — E28.2 POLYCYSTIC OVARIAN SYNDROME: ICD-10-CM

## 2018-04-10 LAB
ALBUMIN SERPL BCP-MCNC: 3.5 G/DL (ref 3.5–5)
ALP SERPL-CCNC: 44 U/L (ref 46–116)
ALT SERPL W P-5'-P-CCNC: 35 U/L (ref 12–78)
ANION GAP SERPL CALCULATED.3IONS-SCNC: 5 MMOL/L (ref 4–13)
AST SERPL W P-5'-P-CCNC: 17 U/L (ref 5–45)
BASOPHILS # BLD AUTO: 0.02 THOUSANDS/ΜL (ref 0–0.1)
BASOPHILS NFR BLD AUTO: 0 % (ref 0–1)
BILIRUB SERPL-MCNC: 0.31 MG/DL (ref 0.2–1)
BUN SERPL-MCNC: 7 MG/DL (ref 5–25)
CALCIUM SERPL-MCNC: 8.5 MG/DL
CHLORIDE SERPL-SCNC: 107 MMOL/L (ref 100–108)
CO2 SERPL-SCNC: 26 MMOL/L (ref 21–32)
CREAT SERPL-MCNC: 0.56 MG/DL (ref 0.6–1.3)
EOSINOPHIL # BLD AUTO: 0.09 THOUSAND/ΜL (ref 0–0.61)
EOSINOPHIL NFR BLD AUTO: 1 % (ref 0–6)
ERYTHROCYTE [DISTWIDTH] IN BLOOD BY AUTOMATED COUNT: 13.9 % (ref 11.6–15.1)
EST. AVERAGE GLUCOSE BLD GHB EST-MCNC: 108 MG/DL
GFR SERPL CREATININE-BSD FRML MDRD: 124 ML/MIN/1.73SQ M
GLUCOSE P FAST SERPL-MCNC: 98 MG/DL (ref 65–99)
HBA1C MFR BLD: 5.4 % (ref 4.2–6.3)
HCT VFR BLD AUTO: 40.7 % (ref 34.8–46.1)
HGB BLD-MCNC: 12.7 G/DL (ref 11.5–15.4)
INSULIN SERPL-ACNC: 21 MU/L (ref 3–25)
LYMPHOCYTES # BLD AUTO: 2.96 THOUSANDS/ΜL (ref 0.6–4.47)
LYMPHOCYTES NFR BLD AUTO: 40 % (ref 14–44)
MCH RBC QN AUTO: 28 PG (ref 26.8–34.3)
MCHC RBC AUTO-ENTMCNC: 31.2 G/DL (ref 31.4–37.4)
MCV RBC AUTO: 90 FL (ref 82–98)
MONOCYTES # BLD AUTO: 0.55 THOUSAND/ΜL (ref 0.17–1.22)
MONOCYTES NFR BLD AUTO: 7 % (ref 4–12)
NEUTROPHILS # BLD AUTO: 3.86 THOUSANDS/ΜL (ref 1.85–7.62)
NEUTS SEG NFR BLD AUTO: 52 % (ref 43–75)
NRBC BLD AUTO-RTO: 1 /100 WBCS
PLATELET # BLD AUTO: 270 THOUSANDS/UL (ref 149–390)
PMV BLD AUTO: 10.2 FL (ref 8.9–12.7)
POTASSIUM SERPL-SCNC: 4 MMOL/L (ref 3.5–5.3)
PROGEST SERPL-MCNC: 5.6 NG/ML
PROT SERPL-MCNC: 7 G/DL (ref 6.4–8.2)
RBC # BLD AUTO: 4.54 MILLION/UL (ref 3.81–5.12)
SODIUM SERPL-SCNC: 138 MMOL/L (ref 136–145)
T4 FREE SERPL-MCNC: 0.92 NG/DL (ref 0.76–1.46)
TSH SERPL DL<=0.05 MIU/L-ACNC: 3.54 UIU/ML (ref 0.36–3.74)
WBC # BLD AUTO: 7.5 THOUSAND/UL (ref 4.31–10.16)

## 2018-04-10 PROCEDURE — 84439 ASSAY OF FREE THYROXINE: CPT

## 2018-04-10 PROCEDURE — 84403 ASSAY OF TOTAL TESTOSTERONE: CPT

## 2018-04-10 PROCEDURE — 83525 ASSAY OF INSULIN: CPT

## 2018-04-10 PROCEDURE — 84144 ASSAY OF PROGESTERONE: CPT

## 2018-04-10 PROCEDURE — 83036 HEMOGLOBIN GLYCOSYLATED A1C: CPT

## 2018-04-10 PROCEDURE — 36415 COLL VENOUS BLD VENIPUNCTURE: CPT

## 2018-04-10 PROCEDURE — 84402 ASSAY OF FREE TESTOSTERONE: CPT

## 2018-04-10 PROCEDURE — 85025 COMPLETE CBC W/AUTO DIFF WBC: CPT

## 2018-04-10 PROCEDURE — 80053 COMPREHEN METABOLIC PANEL: CPT

## 2018-04-10 PROCEDURE — 84443 ASSAY THYROID STIM HORMONE: CPT

## 2018-04-10 PROCEDURE — 82627 DEHYDROEPIANDROSTERONE: CPT

## 2018-04-11 LAB
DHEA-S SERPL-MCNC: 178.7 UG/DL (ref 84.8–378)
TESTOST FREE SERPL-MCNC: 4.3 PG/ML (ref 0–4.2)
TESTOST SERPL-MCNC: 28 NG/DL (ref 8–48)

## 2018-04-17 ENCOUNTER — OFFICE VISIT (OUTPATIENT)
Dept: OBGYN CLINIC | Facility: CLINIC | Age: 33
End: 2018-04-17
Payer: COMMERCIAL

## 2018-04-17 VITALS
BODY MASS INDEX: 34.73 KG/M2 | DIASTOLIC BLOOD PRESSURE: 76 MMHG | HEIGHT: 57 IN | WEIGHT: 161 LBS | SYSTOLIC BLOOD PRESSURE: 118 MMHG

## 2018-04-17 DIAGNOSIS — N92.6 IRREGULAR MENSES: ICD-10-CM

## 2018-04-17 DIAGNOSIS — Z31.69 ENCOUNTER FOR PRECONCEPTION CONSULTATION: Primary | ICD-10-CM

## 2018-04-17 PROBLEM — O03.9 SPONTANEOUS ABORTION: Status: ACTIVE | Noted: 2017-10-09

## 2018-04-17 PROBLEM — N91.2 AMENORRHEA: Status: ACTIVE | Noted: 2017-12-12

## 2018-04-17 PROCEDURE — 99213 OFFICE O/P EST LOW 20 MIN: CPT | Performed by: PHYSICIAN ASSISTANT

## 2018-04-17 RX ORDER — LEVOTHYROXINE SODIUM 0.03 MG/1
25 TABLET ORAL DAILY
Qty: 30 TABLET | Refills: 1 | Status: SHIPPED | OUTPATIENT
Start: 2018-04-17 | End: 2018-05-15 | Stop reason: SDUPTHER

## 2018-04-17 NOTE — PATIENT INSTRUCTIONS
Pt to plan prog 7 days after ovulation next cycle  Pt to plan to slowly increase Metformin to 500 mg TID w meals  Continue PNV  Will recheck TFTs 4-6 weeks after tx has begun

## 2018-04-17 NOTE — PROGRESS NOTES
Assessment/Plan:    No problem-specific Assessment & Plan notes found for this encounter  Diagnoses and all orders for this visit:    Encounter for preconception consultation  Comments: Will plan to repeat prog next cycle 7 days after ovulation due to low level this cycle although I question validity of timing w longer cycles in pt  Irregular menses  -     levothyroxine 25 mcg tablet; Take 1 tablet (25 mcg total) by mouth daily  -     metFORMIN (GLUCOPHAGE) 500 mg tablet; 1 po qd x 7 days w meal, then 1 po BID x 7days w meals, then up to 1 po TID w meals thereafter   -     Progesterone; Future  -     TSH, 3rd generation; Future  -     T4, free; Future          Subjective:      Patient ID: Chester Lion is a 28 y o  female  Pt for f/u to blood work and to review menses  I thoroughly r/w pt BW results and options  Pt will plan to start Synthroid 25mcg qd  Advised risks/benefits  Pt has done Metformin previously and desires to restart that as well in relation to elevated fasting insulin  R/w pt GI side effects, r/w pt risks and benefits of medication use  Pt also questions clotting issues in pregnancy and use of blood thinners  I r/w pt RPL and diagnostic criteria for most insurance companies to cover testing  Pt w family hx elderly grandparent w PE and one w another clot of some form, but nothing in a first degree relative or anyone at a younger age  At this point pt will not plan to proceed w hypercoag testing but will monitor prog and TFTs  The following portions of the patient's history were reviewed and updated as appropriate: current medications, past family history, past medical history, past social history, past surgical history and problem list     Review of Systems   Constitutional: Negative  Gastrointestinal: Negative for blood in stool, constipation, diarrhea and nausea  Genitourinary: Positive for menstrual problem (cycles slightly irregular come q 30-32 days)   Negative for dyspareunia, dysuria, pelvic pain and urgency  Psychiatric/Behavioral: Negative for behavioral problems  The patient is not nervous/anxious  Objective:               Physical Exam   Constitutional: She is oriented to person, place, and time  She appears well-developed and well-nourished  Neurological: She is alert and oriented to person, place, and time  Psychiatric: She has a normal mood and affect   Her behavior is normal  Judgment and thought content normal

## 2018-05-09 ENCOUNTER — OFFICE VISIT (OUTPATIENT)
Dept: OBGYN CLINIC | Facility: CLINIC | Age: 33
End: 2018-05-09
Payer: COMMERCIAL

## 2018-05-09 VITALS
SYSTOLIC BLOOD PRESSURE: 118 MMHG | BODY MASS INDEX: 31.22 KG/M2 | WEIGHT: 159 LBS | HEIGHT: 60 IN | DIASTOLIC BLOOD PRESSURE: 78 MMHG

## 2018-05-09 DIAGNOSIS — N89.8 VAGINAL DISCHARGE: ICD-10-CM

## 2018-05-09 DIAGNOSIS — R10.2 PELVIC PAIN: Primary | ICD-10-CM

## 2018-05-09 PROCEDURE — 99213 OFFICE O/P EST LOW 20 MIN: CPT | Performed by: NURSE PRACTITIONER

## 2018-05-10 ENCOUNTER — HOSPITAL ENCOUNTER (EMERGENCY)
Facility: HOSPITAL | Age: 33
Discharge: HOME | End: 2018-05-10
Attending: EMERGENCY MEDICINE
Payer: COMMERCIAL

## 2018-05-10 ENCOUNTER — APPOINTMENT (EMERGENCY)
Dept: RADIOLOGY | Facility: HOSPITAL | Age: 33
End: 2018-05-10
Payer: COMMERCIAL

## 2018-05-10 VITALS
SYSTOLIC BLOOD PRESSURE: 103 MMHG | WEIGHT: 158 LBS | DIASTOLIC BLOOD PRESSURE: 53 MMHG | RESPIRATION RATE: 18 BRPM | HEART RATE: 82 BPM | TEMPERATURE: 98.2 F | BODY MASS INDEX: 30.86 KG/M2 | OXYGEN SATURATION: 97 %

## 2018-05-10 DIAGNOSIS — N83.209 OVARIAN CYST: ICD-10-CM

## 2018-05-10 DIAGNOSIS — R10.2 PELVIC PAIN IN FEMALE: Primary | ICD-10-CM

## 2018-05-10 PROBLEM — N89.8 VAGINAL DISCHARGE: Status: ACTIVE | Noted: 2018-05-10

## 2018-05-10 LAB
BACTERIA UR QL AUTO: ABNORMAL /HPF
BILIRUB UR QL STRIP: NEGATIVE
CLARITY UR: CLEAR
COLOR UR: YELLOW
COLOR, POC: NORMAL
EXT PREG TEST URINE: NEGATIVE
GLUCOSE UR STRIP-MCNC: NEGATIVE MG/DL
HGB UR QL STRIP.AUTO: NEGATIVE
KETONES UR STRIP-MCNC: NEGATIVE MG/DL
LEUKOCYTE ESTERASE UR QL STRIP: ABNORMAL
NITRITE UR QL STRIP: NEGATIVE
NON-SQ EPI CELLS URNS QL MICRO: ABNORMAL /HPF
PH UR STRIP.AUTO: 6.5 [PH] (ref 4.5–8)
PROT UR STRIP-MCNC: NEGATIVE MG/DL
RBC #/AREA URNS AUTO: ABNORMAL /HPF
SP GR UR STRIP.AUTO: 1.02 (ref 1–1.03)
UROBILINOGEN UR QL STRIP.AUTO: 0.2 E.U./DL
WBC #/AREA URNS AUTO: ABNORMAL /HPF

## 2018-05-10 PROCEDURE — 76830 TRANSVAGINAL US NON-OB: CPT

## 2018-05-10 PROCEDURE — 99284 EMERGENCY DEPT VISIT MOD MDM: CPT

## 2018-05-10 PROCEDURE — 93979 VASCULAR STUDY: CPT

## 2018-05-10 PROCEDURE — 81025 URINE PREGNANCY TEST: CPT | Performed by: EMERGENCY MEDICINE

## 2018-05-10 PROCEDURE — 81002 URINALYSIS NONAUTO W/O SCOPE: CPT | Performed by: EMERGENCY MEDICINE

## 2018-05-10 PROCEDURE — 81001 URINALYSIS AUTO W/SCOPE: CPT

## 2018-05-10 PROCEDURE — 76856 US EXAM PELVIC COMPLETE: CPT

## 2018-05-10 RX ORDER — SENNOSIDES 8.6 MG
650 CAPSULE ORAL EVERY 8 HOURS PRN
Qty: 30 TABLET | Refills: 0 | Status: SHIPPED | OUTPATIENT
Start: 2018-05-10 | End: 2019-03-25

## 2018-05-10 RX ORDER — ACETAMINOPHEN 325 MG/1
650 TABLET ORAL ONCE
Status: COMPLETED | OUTPATIENT
Start: 2018-05-10 | End: 2018-05-10

## 2018-05-10 RX ADMIN — ACETAMINOPHEN 650 MG: 325 TABLET, FILM COATED ORAL at 20:41

## 2018-05-10 NOTE — ED PROVIDER NOTES
History  Chief Complaint   Patient presents with    Abdominal Pain     lower abdominal/pelvic pain since yesterday am  states it is an intermittent stabbing pain, increased pain when ambulating  reports increased urinary frequency but denies burning and vaginal discharge seen at [facility] [place] yesterday and sent for an ultrasound scheduled tomorrow but is having increased pain     27 yo F presents to ED for evaluation of lower quadrant abdominal/pelvic pain (suprapubic) since yesterday morning  Pt states pain is constant non-radiating aching with associated nausea, no alleviating or exacerbating factors  Pt denies any trauma/injury, HA, visual changes, neck/back pain, chest pain, dyspnea, cough/cold symptoms, fever/chills, V/D, dysuria/hematuria, vaginal bleeding/discharge, rash, peripheral edema or focal neuro deficits  Pt states she has been actively trying to get pregnant and is currently ovulating  Pt was seen by OBGYN yesterday with pelvic exam performed and genital cultures sents (results pending), no foul-smelling discharge or cervical motion tenderness  Pt has PMH of hypothyroidism and PCOS (currently on Metformin because of this), no significant PSH  Pt is a nonsmoker, denies any ETOH or recreational drug use  No interventions prior to arrival, no other complaints at this time  Prior to Admission Medications   Prescriptions Last Dose Informant Patient Reported? Taking? Prenatal MV-Min-Fe Fum-FA-DHA (PRENATAL 1 PO)   Yes No   Sig: Take by mouth   levothyroxine 25 mcg tablet   No Yes   Sig: Take 1 tablet (25 mcg total) by mouth daily   metFORMIN (GLUCOPHAGE) 500 mg tablet   No Yes   Si po qd x 7 days w meal, then 1 po BID x 7days w meals, then up to 1 po TID w meals thereafter  Facility-Administered Medications: None       Past Medical History:   Diagnosis Date    Disease of thyroid gland     Pre-diabetes        History reviewed  No pertinent surgical history      Family History Problem Relation Age of Onset    Diabetes Mother     Migraines Mother     Polycystic ovary syndrome Mother     Thyroid disease Mother     Thyroid disease Sister     Diabetes Maternal Grandmother     Thyroid disease Maternal Grandmother     Thyroid disease Maternal Aunt     Melanoma Family      I have reviewed and agree with the history as documented  Social History   Substance Use Topics    Smoking status: Never Smoker    Smokeless tobacco: Never Used    Alcohol use No        Review of Systems   Constitutional: Negative for chills, fatigue and fever  HENT: Negative for congestion, rhinorrhea and sore throat  Eyes: Negative for pain, redness and visual disturbance  Respiratory: Negative for cough, chest tightness, shortness of breath, wheezing and stridor  Cardiovascular: Negative for chest pain, palpitations and leg swelling  Gastrointestinal: Positive for abdominal pain (suprapubic pain) and nausea  Negative for blood in stool, constipation, diarrhea and vomiting  Genitourinary: Positive for pelvic pain  Negative for dysuria, hematuria, urgency, vaginal bleeding, vaginal discharge and vaginal pain  Musculoskeletal: Negative for back pain and neck pain  Skin: Negative for pallor and rash  Neurological: Negative for dizziness, seizures, syncope, weakness, light-headedness and headaches  Psychiatric/Behavioral: Negative for agitation and confusion  Physical Exam  ED Triage Vitals [05/10/18 1842]   Temperature Pulse Respirations Blood Pressure SpO2   98 2 °F (36 8 °C) 89 18 128/57 96 %      Temp Source Heart Rate Source Patient Position - Orthostatic VS BP Location FiO2 (%)   Oral Monitor Sitting Left arm --      Pain Score       7           Orthostatic Vital Signs  Vitals:    05/10/18 1842 05/10/18 2125   BP: 128/57 103/53   Pulse: 89 82   Patient Position - Orthostatic VS: Sitting Lying       Physical Exam   Constitutional: She is oriented to person, place, and time   She appears well-developed and well-nourished  No distress  HENT:   Head: Normocephalic and atraumatic  Nose: Nose normal    Mouth/Throat: Oropharynx is clear and moist  No oropharyngeal exudate  Eyes: Conjunctivae and EOM are normal  Pupils are equal, round, and reactive to light  Right eye exhibits no discharge  Left eye exhibits no discharge  Neck: Normal range of motion  Neck supple  No JVD present  No tracheal deviation present  Cardiovascular: Normal rate, regular rhythm, normal heart sounds and intact distal pulses  Exam reveals no gallop and no friction rub  No murmur heard  Pulmonary/Chest: Effort normal and breath sounds normal  No stridor  No respiratory distress  She has no wheezes  She has no rales  She exhibits no tenderness  Abdominal: Soft  Bowel sounds are normal  She exhibits no distension  There is tenderness (suprapubic tenderness, no RLQ/LLQ abdominal tenderness)  There is no rebound and no guarding  No flank or CVA tenderness   Musculoskeletal: Normal range of motion  She exhibits no edema, tenderness or deformity  Neurological: She is alert and oriented to person, place, and time  No cranial nerve deficit  Skin: Skin is warm and dry  No rash noted  Psychiatric: She has a normal mood and affect  Nursing note and vitals reviewed        ED Medications  Medications   acetaminophen (TYLENOL) tablet 650 mg (650 mg Oral Given 5/10/18 2041)       Diagnostic Studies  Results Reviewed     Procedure Component Value Units Date/Time    Urine Microscopic [[id number]]  (Abnormal) Collected:  05/10/18 1943    Lab Status:  Final result Specimen:  Urine from Urine, Clean Catch Updated:  05/10/18 2042     RBC, UA 2-4 (A) /hpf      WBC, UA 2-4 (A) /hpf      Epithelial Cells Moderate (A) /hpf      Bacteria, UA Moderate (A) /hpf     POCT urinalysis dipstick [[id number]]  (Normal) Resulted:  05/10/18 1947    Lab Status:  Final result Updated:  05/10/18 1947     Color, UA see results    POCT pregnancy, urine [[id number]]  (Normal) Resulted:  05/10/18 1942    Lab Status:  Final result Updated:  05/10/18 1942     EXT PREG TEST UR (Ref: Negative) negative    ED Urine Macroscopic [[id number]]  (Abnormal) Collected:  05/10/18 1943    Lab Status:  Final result Specimen:  Urine Updated:  05/10/18 1939     Color, UA Yellow     Clarity, UA Clear     pH, UA 6 5     Leukocytes, UA Trace (A)     Nitrite, UA Negative     Protein, UA Negative mg/dl      Glucose, UA Negative mg/dl      Ketones, UA Negative mg/dl      Urobilinogen, UA 0 2 E U /dl      Bilirubin, UA Negative     Blood, UA Negative     Specific Gravity, UA 1 020    Narrative:       CLINITEK RESULT                 US duplex aorta and/or ivc limited   Final Result by Devan Hernandes MD (05/11 1238)       Complex left ovarian 3 x 2 4 x 2 5 cm structure could represent a hemorrhagic cyst   Follow-up pelvic ultrasound recommended in 6 weeks  Subcentimeter posterior intramural uterine fibroid/mass  Findings were marked as "immediate"in Epic and will now be related to the ordering physician or covering clinical team by the radiology liaison  Workstation performed: [id number]         US pelvis complete w transvaginal   Final Result by Devan Hernandes MD (05/10 2122)       Complex left ovarian 3 x 2 4 x 2 5 cm structure could represent a hemorrhagic cyst   Follow-up pelvic ultrasound recommended in 6 weeks  Subcentimeter posterior intramural uterine fibroid/mass  Findings were marked as "immediate"in Epic and will now be related to the ordering physician or covering clinical team by the radiology liaison  Workstation performed: [id number]               Procedures  Procedures      Phone Consults  ED Phone Contact    ED Course                Patient reevaluated with improvement in condition noted  Patient updated on results of tests   Discharge instructions given including medications, follow-up and return precautions with understanding verbalized  MDM  CritCare Time    Disposition  Final diagnoses:   Pelvic pain in female   Ovarian cyst     Time reflects when diagnosis was documented in both MDM as applicable and the Disposition within this note     Time User Action Codes Description Comment    5/10/2018 10:03 PM Alana Hanks Add [R10 2] Pelvic pain in female     5/10/2018 10:04 PM Alana Hanks Add [N83 209] Ovarian cyst       ED Disposition     ED Disposition Condition Comment    Discharge  Daniel 80 discharge to home/self care  Condition at discharge: Stable        Follow-up Information     Follow up With Specialties Details Why Contact Info    Faby Phillips MD Obstetrics and Gynecology, Obstetrics, Gynecology  Call tomorrow to schedule appointment for reevaluation of symptoms and further workup as needed [address]  [phone]          Discharge Medication List as of 5/10/2018 10:07 PM      START taking these medications    Details   acetaminophen (TYLENOL) 650 mg CR tablet Take 1 tablet (650 mg total) by mouth every 8 (eight) hours as needed for mild pain or moderate pain, Starting Thu 5/10/2018, Print         CONTINUE these medications which have NOT CHANGED    Details   levothyroxine 25 mcg tablet Take 1 tablet (25 mcg total) by mouth daily, Starting Tue 4/17/2018, Normal      metFORMIN (GLUCOPHAGE) 500 mg tablet 1 po qd x 7 days w meal, then 1 po BID x 7days w meals, then up to 1 po TID w meals thereafter , Normal      Prenatal MV-Min-Fe Fum-FA-DHA (PRENATAL 1 PO) Take by mouth, Historical Med           No discharge procedures on file  ED Provider  Attending physically available and evaluated Daniel Diehl  I managed the patient along with the ED Attending      Electronically Signed by         Leatha Prater DO  05/13/18 [id number] RUBY Reveles DO  05/13/18 7366

## 2018-05-11 NOTE — ASSESSMENT & PLAN NOTE
Reviewed normal pelvic exam today  No bleeding, abnormal discharge, or odors noted on exam  No cervical motion tenderness present  Advised Motrin 600mg Q 6 hours for pelvic pain  Rx for pelvic ultrasound  Will call with results  Advised if symptoms worsen go to ER    RTO for annual or sooner as needed

## 2018-05-11 NOTE — PROGRESS NOTES
Assessment/Plan:    Vaginal discharge  Will send out culture to assess for vaginal discharge and pelvic pain  Will call with results, will treat based on results  Pelvic pain  Reviewed normal pelvic exam today  No bleeding, abnormal discharge, or odors noted on exam  No cervical motion tenderness present  Advised Motrin 600mg Q 6 hours for pelvic pain  Rx for pelvic ultrasound  Will call with results  Advised if symptoms worsen go to ER    RTO for annual or sooner as needed  Diagnoses and all orders for this visit:    Pelvic pain  -     US pelvis complete w transvaginal; Future    Vaginal discharge  -     GP Culture, Genital          Subjective:      Patient ID: Corinna Diamond is a 28 y o  female  Pt presents today for evaluation of lower abdominal/pelvic pain  Pt states pain began when she woke this morning  Pt states pain was so severe she had difficulty getting out of bed  Pain described as lower mid pelvic region maybe slightly more to the left, sharp shooting pain  Pt states pain was so bad it was difficult to walk and "taking her breath away"  Pt was nauseous earlier, but that has since resolved  Denies any vomiting, fever, chills  Denies any bowel or bladder issues  Pt denies any constipation  Pt currently trying to conceive  Pt denies possibility of pregnancy as per [facility] at home ovulated this past Saturday  Pt states pain has improved throughout the day, but is still very intense  Pt denies any vaginal bleeding  LMP: 4/20/18 which was normal  Denies taking any new medications  Pt states she does take Metformin to help regulate menses due to PCOS, but has been taking that for a few months  Pt states she passes flatus and is having regular bowel movements without difficulty  Pt unsure if ever had pap smear  Denies the need for STD testing   Denies states she does have vaginal discharge but thinks it is normal ovulatory discharge, Pt denies any itching, burning, or odor associated with discharge  Pt has not taken any medication to help with pain  LMP: 18  Period Cycle (Days): 30  Period Duration (Days): 8-9  Period Pattern: Regular  Menstrual Flow: Heavy  Menstrual Control: Maxi pad  Dysmenorrhea: None  OB History      Para Term  AB Living    2       2      SAB TAB Ectopic Multiple Live Births    2           Obstetric Comments    Amenorrhea               The following portions of the patient's history were reviewed and updated as appropriate: allergies, current medications, past family history, past medical history, past social history, past surgical history and problem list     Review of Systems   Genitourinary: Positive for pelvic pain and vaginal discharge  All other systems reviewed and are negative  Objective:      /78 (BP Location: Left arm, Patient Position: Sitting, Cuff Size: Standard)   Ht 5' (1 524 m)   Wt 72 1 kg (159 lb)   LMP 2018   BMI 31 05 kg/m²          Physical Exam   Constitutional: She is oriented to person, place, and time  She appears well-developed and well-nourished  HENT:   Head: Normocephalic  Neck: Normal range of motion  Neck supple  No tracheal deviation present  No thyromegaly present  Cardiovascular: Normal rate, regular rhythm and normal heart sounds  Pulmonary/Chest: Effort normal and breath sounds normal    Abdominal: Soft  Bowel sounds are normal  She exhibits no distension and no mass  There is tenderness (lower mid pelvic region )  There is no rebound and no guarding  Genitourinary: Vagina normal and uterus normal  No labial fusion  There is no rash, tenderness, lesion or injury on the right labia  There is no rash, tenderness, lesion or injury on the left labia  Cervix exhibits no motion tenderness, no discharge and no friability  Right adnexum displays no mass, no tenderness and no fullness  Left adnexum displays no mass, no tenderness and no fullness  Musculoskeletal: Normal range of motion  Neurological: She is alert and oriented to person, place, and time  Skin: Skin is warm and dry  Psychiatric: She has a normal mood and affect   Her behavior is normal  Judgment and thought content normal

## 2018-05-11 NOTE — DISCHARGE INSTRUCTIONS
Take medications as directed  Stay well hydrated  Follow up with OBGYN for reevaluation and further workup as needed  Return to ED if new or worsening symptoms for immediate reevaluation  Ovarian Cyst   WHAT YOU NEED TO KNOW:   An ovarian cyst is a sac that grows on an ovary  This sac usually contains fluid, but may sometimes have blood or tissue in it  Most ovarian cysts are harmless and go away without treatment in a few months  Some cysts can grow large, cause pain, or break open  DISCHARGE INSTRUCTIONS:   Call 911 for any of the following:   · You are too weak or dizzy to stand up  Return to the emergency department if:   · You have severe abdominal pain  The pain may be sharp and sudden  · You have a fever  Contact your healthcare provider if:   · Your periods are early, late, or more painful than usual     · You have bleeding from your vagina that is not your period  · You have abdominal pain all the time  · Your abdomen is swollen  · You have feelings of fullness, pressure, or discomfort in your abdomen  · You have trouble urinating or emptying your bladder completely  · You have pain during sex  · You are losing weight without trying  · You have questions or concerns about your condition or care  Medicines: You may need any of the following:  · NSAIDs , such as ibuprofen, help decrease swelling, pain, and fever  This medicine is available with or without a doctor's order  NSAIDs can cause stomach bleeding or kidney problems in certain people  If you take blood thinner medicine, always ask if NSAIDs are safe for you  Always read the medicine label and follow directions  Do not give these medicines to children under 10months of age without direction from your child's healthcare provider  · Birth control pills  may help to control your periods, prevent cysts, or cause them to shrink  · Take your medicine as directed    Contact your healthcare provider if you think your medicine is not helping or if you have side effects  Tell him or her if you are allergic to any medicine  Keep a list of the medicines, vitamins, and herbs you take  Include the amounts, and when and why you take them  Bring the list or the pill bottles to follow-up visits  Carry your medicine list with you in case of an emergency  Follow up with your healthcare provider as directed:  Write down your questions so you remember to ask them during your visits  Apply heat to decrease pain and cramping:  Sit in a warm bath, or place a heating pad (turned on low) or a hot water bottle on your abdomen  Do this for 15 to 20 minutes every hour for as many days as directed  © 2017 [employer] Information is for End User's use only and may not be sold, redistributed or otherwise used for commercial purposes  All illustrations and images included in CareNotes® are the copyrighted property of [employer]  or [employer] Orte[employer]  The above information is an  only  It is not intended as medical advice for individual conditions or treatments  Talk to your doctor, nurse or pharmacist before following any medical regimen to see if it is safe and effective for you

## 2018-05-11 NOTE — ASSESSMENT & PLAN NOTE
Will send out culture to assess for vaginal discharge and pelvic pain  Will call with results, will treat based on results

## 2018-05-11 NOTE — ED ATTENDING ATTESTATION
Domitila Yanes MD, saw and evaluated the patient  All available labs and X-rays were ordered by me or the resident and have been reviewed by myself  I discussed the patient with the resident / non-physician and agree with the resident's / non-physician practitioner's findings and plan as documented in the resident's / non-physician practicitioner's note, except where noted  At this point, I agree with the current assessment done in the ED  Chief Complaint   Patient presents with    Abdominal Pain     lower abdominal/pelvic pain since yesterday am  states it is an intermittent stabbing pain, increased pain when ambulating  reports increased urinary frequency but denies burning and vaginal discharge seen at [facility] [place] yesterday and sent for an ultrasound scheduled tomorrow but is having increased pain     This is a 27-year-old female presenting for evaluation of suprapubic pain primarily  The patient states that since yesterday morning she woke up with this persistent lower abdominal pain  It is associated with nausea, no vomiting, no anorexia  She has no burning itching pain with urination but has increased frequency and need to urinate  Denies vaginal symptoms including vaginal discharge or vaginal bleeding  Due to the pelvic pain though she went to her primary care doctor/OB Gyne  The obstetrician did a vaginal exam which was normal, no obvious PID  A pelvic ultrasound was ordered which is scheduled to be done tomorrow  The patient was told to come to the emergency room if she has worsening symptoms  Because of increased intermittent sharp pain in lower belly she has come in for evaluation  Denies fevers chills chest pain shortness of breath  Denies diarrhea or constipation  Her  is in the room and he has no symptoms  No recent travel or long drives  No over-the-counter medications were used   Never had similar in the past    PMH:  - pDM  - Hypothyroidism  PSH:  - none  No smoking, drinking, drugs  PE:  Vitals:    05/10/18 1842 05/10/18 2125   BP: 128/57 103/53   BP Location: Left arm Right arm   Pulse: 89 82   Resp: 18 18   Temp: 98 2 °F (36 8 °C)    TempSrc: Oral    SpO2: 96% 97%   Weight: 71 7 kg (158 lb)    General: VSS, NAD, awake, alert  Well-nourished, well-developed  Appears stated age  Speaking normally in full sentences  Head: Normocephalic, atraumatic, nontender  Eyes: PERRL, EOM-I  No diplopia  No hyphema  No subconjunctival hemorrhages  Symmetrical lids  ENT: Atraumatic external nose and ears  MMM  No malocclusion  No stridor  Normal phonation  No drooling  Normal swallowing  Neck: Symmetric, trachea midline  No JVD  CV: RRR  +S1/S2  No murmurs or gallops  Peripheral pulses +2 throughout  No chest wall tenderness  Lungs:   Unlabored No retractions  CTAB, lungs sounds equal bilateral    No tachypnea  Abd: +BS, soft  Lower belly tenderness (RLQ/suprapubpic)  ND   Heel strike negative  MSK:   FROM   Back:   No rashes  Skin: Dry, intact  Neuro: AAOx3, GCS 15, CN II-XII grossly intact  Motor grossly intact  Psychiatric/Behavioral: Appropriate mood and affect   Exam: deferred  A:  - Belly pain  P:  - Urine  - Imaging of abdomen  - Dispo per imaging  - 13 point ROS was performed and all are normal unless stated in the history above  - Nursing note reviewed  Vitals reviewed  - Orders placed by myself and/or advanced practitioner / resident     - Previous chart was reviewed  - No language barrier    - History obtained from patient  - There are no limitations to the history obtained  - Critical care time: Not applicable for this patient  Final Diagnosis:  1  Pelvic pain in female    2   Ovarian cyst        ED Course as of May 13 0919   Thu May 10, 2018   2039 Nitrite, UA: Negative   2039 Leukocytes, UA: (!) Trace   2039 EXT PREG TEST UR (Ref: Negative): negative   2054 Bacteria, UA: (!) Moderate   2054 WBC, UA: (!) 2-4     Medications   acetaminophen (TYLENOL) tablet 650 mg (650 mg Oral Given 5/10/18 2041)     US duplex aorta and/or ivc limited   Final Result       Complex left ovarian 3 x 2 4 x 2 5 cm structure could represent a hemorrhagic cyst   Follow-up pelvic ultrasound recommended in 6 weeks  Subcentimeter posterior intramural uterine fibroid/mass  Findings were marked as "immediate"in Epic and will now be related to the ordering physician or covering clinical team by the radiology liaison  Workstation performed: [id number]         US pelvis complete w transvaginal   Final Result       Complex left ovarian 3 x 2 4 x 2 5 cm structure could represent a hemorrhagic cyst   Follow-up pelvic ultrasound recommended in 6 weeks  Subcentimeter posterior intramural uterine fibroid/mass  Findings were marked as "immediate"in Epic and will now be related to the ordering physician or covering clinical team by the radiology liaison                    Workstation performed: [id number]           Orders Placed This Encounter   Procedures    US pelvis complete w transvaginal    US duplex aorta and/or ivc limited    Urine Microscopic    POCT urinalysis dipstick    POCT pregnancy, urine     Labs Reviewed   URINE MICROSCOPIC - Abnormal        Result Value Ref Range Status    RBC, UA 2-4 (*) None Seen, 0-5 /hpf Final    WBC, UA 2-4 (*) None Seen, 0-5, 5-55, 5-65 /hpf Final    Epithelial Cells Moderate (*) None Seen, Occasional /hpf Final    Bacteria, UA Moderate (*) None Seen, Occasional /hpf Final   ED URINE MACROSCOPIC - Abnormal     Color, UA Yellow   Final    Clarity, UA Clear   Final    pH, UA 6 5  4 5 - 8 0 Final    Leukocytes, UA Trace (*) Negative Final    Nitrite, UA Negative  Negative Final    Protein, UA Negative  Negative mg/dl Final    Glucose, UA Negative  Negative mg/dl Final    Ketones, UA Negative  Negative mg/dl Final    Urobilinogen, UA 0 2  0 2, 1 0 E U /dl E U /dl Final    Bilirubin, UA Negative  Negative Final Blood, UA Negative  Negative Final    Specific Gravity, UA 1 020  1 003 - 1 030 Final    Narrative:     CLINITEK RESULT   POCT URINALYSIS DIPSTICK - Normal    Color, UA see results   Final   POCT PREGNANCY, URINE - Normal    EXT PREG TEST UR (Ref: Negative) negative   Final     Time reflects when diagnosis was documented in both MDM as applicable and the Disposition within this note     Time User Action Codes Description Comment    5/10/2018 10:03 PM Jessica Hanks Add [R10 2] Pelvic pain in female     5/10/2018 10:04 PM Jessica Hanks Add [J41 479] Ovarian cyst       ED Disposition     ED Disposition Condition Comment    Discharge  Daniel 80 discharge to home/self care  Condition at discharge: Stable        Follow-up Information     Follow up With Specialties Details Why Contact Info    Machelle Kraus MD Obstetrics and Gynecology, Obstetrics, Gynecology  Call tomorrow to schedule appointment for reevaluation of symptoms and further workup as needed [address]  [phone]          Discharge Medication List as of 5/10/2018 10:07 PM      START taking these medications    Details   acetaminophen (TYLENOL) 650 mg CR tablet Take 1 tablet (650 mg total) by mouth every 8 (eight) hours as needed for mild pain or moderate pain, Starting Thu 5/10/2018, Print         CONTINUE these medications which have NOT CHANGED    Details   levothyroxine 25 mcg tablet Take 1 tablet (25 mcg total) by mouth daily, Starting Tue 4/17/2018, Normal      metFORMIN (GLUCOPHAGE) 500 mg tablet 1 po qd x 7 days w meal, then 1 po BID x 7days w meals, then up to 1 po TID w meals thereafter , Normal      Prenatal MV-Min-Fe Fum-FA-DHA (PRENATAL 1 PO) Take by mouth, Historical Med           No discharge procedures on file  Prior to Admission Medications   Prescriptions Last Dose Informant Patient Reported? Taking?    Prenatal MV-Min-Fe Fum-FA-DHA (PRENATAL 1 PO)   Yes No   Sig: Take by mouth   levothyroxine 25 mcg tablet   No Yes   Sig: Take 1 tablet (25 mcg total) by mouth daily   metFORMIN (GLUCOPHAGE) 500 mg tablet   No Yes   Si po qd x 7 days w meal, then 1 po BID x 7days w meals, then up to 1 po TID w meals thereafter  Facility-Administered Medications: None       Portions of the record may have been created with voice recognition software  Occasional wrong word or "sound a like" substitutions may have occurred due to the inherent limitations of voice recognition software  Read the chart carefully and recognize, using context, where substitutions have occurred      Electronically signed by:  Renetta Barraza

## 2018-05-13 LAB — SL AMB GENITAL CULTURE: ABNORMAL

## 2018-05-14 ENCOUNTER — APPOINTMENT (OUTPATIENT)
Dept: LAB | Age: 33
End: 2018-05-14
Payer: COMMERCIAL

## 2018-05-14 DIAGNOSIS — N92.6 IRREGULAR MENSES: ICD-10-CM

## 2018-05-14 LAB
PROGEST SERPL-MCNC: 13 NG/ML
T4 FREE SERPL-MCNC: 1.12 NG/DL (ref 0.76–1.46)
TSH SERPL DL<=0.05 MIU/L-ACNC: 1.43 UIU/ML (ref 0.36–3.74)

## 2018-05-14 PROCEDURE — 84144 ASSAY OF PROGESTERONE: CPT

## 2018-05-14 PROCEDURE — 84439 ASSAY OF FREE THYROXINE: CPT

## 2018-05-14 PROCEDURE — 84443 ASSAY THYROID STIM HORMONE: CPT

## 2018-05-14 PROCEDURE — 36415 COLL VENOUS BLD VENIPUNCTURE: CPT

## 2018-05-15 ENCOUNTER — TELEPHONE (OUTPATIENT)
Dept: OBGYN CLINIC | Facility: CLINIC | Age: 33
End: 2018-05-15

## 2018-05-15 DIAGNOSIS — N92.6 IRREGULAR MENSES: ICD-10-CM

## 2018-05-15 RX ORDER — LEVOTHYROXINE SODIUM 0.03 MG/1
25 TABLET ORAL DAILY
Qty: 30 TABLET | Refills: 3 | Status: SHIPPED | OUTPATIENT
Start: 2018-05-15 | End: 2018-07-03 | Stop reason: SDUPTHER

## 2018-07-03 DIAGNOSIS — N92.6 IRREGULAR MENSES: ICD-10-CM

## 2018-07-03 RX ORDER — LEVOTHYROXINE SODIUM 0.03 MG/1
25 TABLET ORAL DAILY
Qty: 30 TABLET | Refills: 0 | Status: SHIPPED | OUTPATIENT
Start: 2018-07-03 | End: 2018-09-20 | Stop reason: SDUPTHER

## 2018-07-03 NOTE — TELEPHONE ENCOUNTER
From: Nader Hernandez  Sent: 7/3/2018 11:22 AM EDT  Subject: Medication Renewal Request    Nader Hernandez would like a refill of the following medications:     metFORMIN (GLUCOPHAGE) 500 mg tablet [Katarzyna Fernando PA-C]     levothyroxine 25 mcg tablet Tushar Rivera PA-C]    Preferred pharmacy: [address] [place] [address] [id number]

## 2018-09-20 DIAGNOSIS — N92.6 IRREGULAR MENSES: ICD-10-CM

## 2018-09-20 RX ORDER — LEVOTHYROXINE SODIUM 0.03 MG/1
25 TABLET ORAL DAILY
Qty: 30 TABLET | Refills: 0 | Status: SHIPPED | OUTPATIENT
Start: 2018-09-20 | End: 2018-11-01 | Stop reason: SDUPTHER

## 2018-11-01 DIAGNOSIS — N92.6 IRREGULAR MENSES: ICD-10-CM

## 2018-11-01 RX ORDER — LEVOTHYROXINE SODIUM 0.03 MG/1
25 TABLET ORAL DAILY
Qty: 30 TABLET | Refills: 3 | Status: SHIPPED | OUTPATIENT
Start: 2018-11-01 | End: 2019-03-25

## 2019-02-05 ENCOUNTER — TELEPHONE (OUTPATIENT)
Dept: OBGYN CLINIC | Facility: CLINIC | Age: 34
End: 2019-02-05

## 2019-02-05 ENCOUNTER — APPOINTMENT (OUTPATIENT)
Dept: LAB | Facility: HOSPITAL | Age: 34
End: 2019-02-05
Payer: COMMERCIAL

## 2019-02-05 DIAGNOSIS — Z32.01 POSITIVE URINE PREGNANCY TEST: ICD-10-CM

## 2019-02-05 DIAGNOSIS — Z32.01 POSITIVE URINE PREGNANCY TEST: Primary | ICD-10-CM

## 2019-02-05 LAB — B-HCG SERPL-ACNC: <2 MIU/ML

## 2019-02-05 PROCEDURE — 84702 CHORIONIC GONADOTROPIN TEST: CPT

## 2019-02-05 PROCEDURE — 36415 COLL VENOUS BLD VENIPUNCTURE: CPT

## 2019-03-25 ENCOUNTER — ANNUAL EXAM (OUTPATIENT)
Dept: OBGYN CLINIC | Facility: CLINIC | Age: 34
End: 2019-03-25

## 2019-03-25 ENCOUNTER — OFFICE VISIT (OUTPATIENT)
Dept: OBGYN CLINIC | Facility: CLINIC | Age: 34
End: 2019-03-25
Payer: COMMERCIAL

## 2019-03-25 VITALS
SYSTOLIC BLOOD PRESSURE: 116 MMHG | BODY MASS INDEX: 32 KG/M2 | DIASTOLIC BLOOD PRESSURE: 90 MMHG | HEIGHT: 60 IN | WEIGHT: 163 LBS

## 2019-03-25 DIAGNOSIS — Z01.419 WELL WOMAN EXAM: Primary | ICD-10-CM

## 2019-03-25 DIAGNOSIS — Z31.9 INFERTILITY MANAGEMENT: ICD-10-CM

## 2019-03-25 PROCEDURE — S0612 ANNUAL GYNECOLOGICAL EXAMINA: HCPCS | Performed by: PHYSICIAN ASSISTANT

## 2019-03-25 NOTE — PROGRESS NOTES
Assessment/Plan:    No problem-specific Assessment & Plan notes found for this encounter  Diagnoses and all orders for this visit:    Well woman exam    Infertility management  -     Cancel: CBC and differential; Future  -     Cancel: TSH, 3rd generation; Future  -     Cancel: T4, free; Future  -     Cancel: Insulin, fasting; Future  -     Cancel: Follicle stimulating hormone; Future  -     Cancel: Luteinizing hormone; Future  -     Cancel: Prolactin; Future  -     Cancel: Testosterone, free, total; Future  -     Cancel: Progesterone; Future  -     Cancel: Estradiol; Future          Subjective:      Patient ID: Jean Pierre  is a 35 y o  female  Pt for discussion of fertility treatments  She is a  patient with 2 early spontaneous losses  Pt has been trying for pregnancy since last 1 5-2 years  Pt did see [facility] (Dr Berkley Cruz), had full work up  All BW WNL other than a low AMH of 0 67  Partner did also have low count on his SA as well per pt  Pt also had f/u US w Dr Berkley Cruz and it was normal as well  Pt had been using Metformin previously as well as thyroid meds but has stopped all of them  I did r/w pt importance of med use in preconceptual time period  Pt questions using Clomid here in our office vs with [facility]  I did r/w pt that we do not have the ability to add IUI w meds and that they will plan to follow her closely w US and BW while she is cycling so her chances of pregnancy will be greater there than following w us  Denies vaginal d/c or Gyn complaints otherwise  The following portions of the patient's history were reviewed and updated as appropriate: current medications, past family history, past medical history, past social history, past surgical history and problem list     Review of Systems   Constitutional: Negative for fatigue, fever and unexpected weight change  HENT: Negative for dental problem, mouth sores, nosebleeds, rhinorrhea, sinus pressure, sinus pain and sore throat  Eyes: Negative for pain, discharge and visual disturbance  Respiratory: Negative for cough, chest tightness, shortness of breath and wheezing  Cardiovascular: Negative for chest pain, palpitations and leg swelling  Gastrointestinal: Negative for blood in stool, constipation, diarrhea, nausea and vomiting  Endocrine: Negative for polydipsia  Genitourinary: Negative for difficulty urinating, dyspareunia, dysuria, menstrual problem, pelvic pain, urgency, vaginal discharge and vaginal pain  Musculoskeletal: Negative for arthralgias, back pain and joint swelling  Allergic/Immunologic: Negative for environmental allergies  Neurological: Negative for seizures, light-headedness and headaches  Hematological: Does not bruise/bleed easily  Psychiatric/Behavioral: Negative for sleep disturbance  The patient is not nervous/anxious  Objective:      /90 (BP Location: Left arm, Patient Position: Sitting, Cuff Size: Standard)   Ht 5' (1 524 m)   Wt 73 9 kg (163 lb)   BMI 31 83 kg/m²          Physical Exam   Constitutional: She is oriented to person, place, and time  She appears well-developed and well-nourished  HENT:   Head: Normocephalic and atraumatic  Cardiovascular: Normal rate and regular rhythm  Pulmonary/Chest: Effort normal and breath sounds normal  Right breast exhibits no inverted nipple, no mass, no nipple discharge, no skin change and no tenderness  Left breast exhibits no inverted nipple, no mass, no nipple discharge, no skin change and no tenderness  Breasts are symmetrical    Pt w scars c/w being s/p b/l breast reduction surgery   Abdominal: Soft  She exhibits no distension and no mass  There is no rebound and no guarding  Genitourinary: Vagina normal and uterus normal  No vaginal discharge found  Neurological: She is alert and oriented to person, place, and time  Skin: Skin is warm and dry  Psychiatric: She has a normal mood and affect         Past Medical History:   Diagnosis Date    Disease of thyroid gland     Pre-diabetes                Patient Instructions   Pap done today  I did r w pt based on her age and lab results that she should plan to proceed sooner rather than later if they desire more children  She plans to follow up w [facility] ASAP  She is aware she should plan TFTs and make sure her thryoid levels are well controlled before she begins any tx for ovulation induction  All questions answered

## 2019-03-25 NOTE — PATIENT INSTRUCTIONS
Pap done today  Will plan to follow up with [facility]  I advised pt of importance of making sure her TFTs are under control before she attempts pregnancy as elevated TFTS will increase her risk of loss  Pt agrees to this plan  Will f/u here 1 year or hopefully sooner with a pregnancy

## 2019-03-25 NOTE — PATIENT INSTRUCTIONS
Pap done today  I did r w pt based on her age and lab results that she should plan to proceed sooner rather than later if they desire more children  She plans to follow up w [facility] ASAP  She is aware she should plan TFTs and make sure her thryoid levels are well controlled before she begins any tx for ovulation induction  All questions answered

## 2019-03-25 NOTE — PROGRESS NOTES
Assessment/Plan   Diagnoses and all orders for this visit:    Well woman exam  -     GP Liquid-Based Pap + HPV Plus        Discussion    All questions have been answered to her satisfaction  RTO for APE or sooner if needed      Subjective     Pt presents for annual GYN exam    Pt still trying for pregnancy  It has now been about 2 years  They have had 2 early losses  t saw Dr Antonio Tidwell at [address] last year and had full work up done  Per pt her AMH was low at  62 and her partner had some low numbers (decresed count, motility and morphology) on his SA  Pt also had f/u US done there that was normal as well per pt  They were advised to proceed w CC/IUI  Pt questions if that can be done here in the office or if she should follow w [facility]  Aware that when she follows w [facility] they will monitor BW and US as well as doing the IUI which we do not have the availability to do here in the office  Pt was previously taking Metformin as well as Synthroid  She has discontinued that medicine on her own  I r/w pt the importance of medication use and monitoring with blood work  Nora Cruz is a 35 y o  female who presents for annual well woman exam    Menarche - ; LMP - ; Periods are reg q  days and last  days; No excessive bleeding; No intermenstrual bleeding or spotting; Cramps are tolerable  No vulvar itch/burn; No vaginal itch/burn; No abn discharge or odor; No urinary sx - burning/pain/frequency/hematuria  (+) SBEs - no breast masses, asymmetry, nipple discharge or bleeding, changes in skin of breast, or breast tenderness bilaterally  No abd/pelvic pain or HAs; No menopausal symptoms: No hot flashes/night sweats, problems with intercourse, vaginal dryness; sleeping well;   Pt is sexually active in a mutually monog/ sexual relationship; No issues with intercourse; She declines std/hiv/hep testing; Feels safe at home  Current contraception: TTC  Condom use:none  (+) PCP for routine Bw/care;     Last Pap - unsure if she ever had one done  History of abnormal Pap smear: none     Review of Systems   Constitutional: Negative for fatigue, fever and unexpected weight change  HENT: Negative for dental problem, mouth sores, nosebleeds, rhinorrhea, sinus pressure, sinus pain and sore throat  Eyes: Negative for pain, discharge and visual disturbance  Respiratory: Negative for cough, chest tightness, shortness of breath and wheezing  Cardiovascular: Negative for chest pain, palpitations and leg swelling  Gastrointestinal: Negative for blood in stool, constipation, diarrhea, nausea and vomiting  Endocrine: Negative for polydipsia  Genitourinary: Negative for difficulty urinating, dyspareunia, dysuria, menstrual problem, pelvic pain, urgency, vaginal discharge and vaginal pain  Musculoskeletal: Negative for arthralgias, back pain and joint swelling  Allergic/Immunologic: Negative for environmental allergies  Neurological: Negative for seizures, light-headedness and headaches  Hematological: Does not bruise/bleed easily  Psychiatric/Behavioral: Negative for sleep disturbance  The patient is not nervous/anxious          The following portions of the patient's history were reviewed and updated as appropriate: current medications, past family history, past medical history, past social history, past surgical history and problem list          OB History        2    Para        Term                AB   2    Living           SAB   2    TAB        Ectopic        Multiple        Live Births   0                 Past Medical History:   Diagnosis Date    Disease of thyroid gland     History of PCOS     Pre-diabetes        Past Surgical History:   Procedure Laterality Date    REDUCTION MAMMAPLASTY      WISDOM TOOTH EXTRACTION         Family History   Problem Relation Age of Onset    Diabetes Mother     Migraines Mother     Polycystic ovary syndrome Mother     Thyroid disease Mother     Thyroid disease Sister     Diabetes Maternal Grandmother     Thyroid disease Maternal Grandmother     Thyroid disease Maternal Aunt     Melanoma Family        Social History     Socioeconomic History    Marital status: /Civil Union     Spouse name: Not on file    Number of children: Not on file    Years of education: Not on file    Highest education level: Not on file   Occupational History    Not on file   Social Needs    Financial resource strain: Not on file    Food insecurity:     Worry: Not on file     Inability: Not on file    Transportation needs:     Medical: Not on file     Non-medical: Not on file   Tobacco Use    Smoking status: Never Smoker    Smokeless tobacco: Never Used   Substance and Sexual Activity    Alcohol use: No    Drug use: No    Sexual activity: Yes   Lifestyle    Physical activity:     Days per week: Not on file     Minutes per session: Not on file    Stress: Not on file   Relationships    Social connections:     Talks on phone: Not on file     Gets together: Not on file     Attends [employer] service: Not on file     Active member of club or organization: Not on file     Attends meetings of clubs or organizations: Not on file     Relationship status: Not on file    Intimate partner violence:     Fear of current or ex partner: Not on file     Emotionally abused: Not on file     Physically abused: Not on file     Forced sexual activity: Not on file   Other Topics Concern    Not on file   Social History Narrative    Uses safety equipment seatbelts         Current Outpatient Medications:     Prenatal MV-Min-Fe Fum-FA-DHA (PRENATAL 1 PO), Take by mouth, Disp: , Rfl:     No Known Allergies    Objective   There were no vitals filed for this visit  Physical Exam   Constitutional: She is oriented to person, place, and time  She appears well-developed and well-nourished  HENT:   Head: Normocephalic and atraumatic  Neck: No thyromegaly present     Cardiovascular: Normal rate and regular rhythm  Pulmonary/Chest: Effort normal and breath sounds normal  Right breast exhibits no inverted nipple, no mass, no nipple discharge, no skin change and no tenderness  Left breast exhibits no inverted nipple, no mass, no nipple discharge, no skin change and no tenderness  Breasts are symmetrical    Pt with scars that are c/w her being s/p b/l breast reduction (done 2008)   Abdominal: Soft  She exhibits no distension and no mass  There is no rebound and no guarding  Genitourinary: Vagina normal and uterus normal  No vaginal discharge found  Neurological: She is alert and oriented to person, place, and time  Skin: Skin is warm and dry  Psychiatric: She has a normal mood and affect  Patient Instructions   Pap done today  Will plan to follow up with [facility]  I advised pt of importance of making sure her TFTs are under control before she attempts pregnancy as elevated TFTS will increase her risk of loss  Pt agrees to this plan  Will f/u here 1 year or hopefully sooner with a pregnancy

## 2019-03-30 LAB
HPV HR 12 DNA CVX QL NAA+PROBE: NOT DETECTED
HPV16 DNA SPEC QL NAA+PROBE: NOT DETECTED
HPV18 DNA SPEC QL NAA+PROBE: NOT DETECTED
THIN PREP CVX: NORMAL

## 2019-04-08 ENCOUNTER — TELEPHONE (OUTPATIENT)
Dept: OBGYN CLINIC | Facility: CLINIC | Age: 34
End: 2019-04-08

## 2019-04-08 ENCOUNTER — APPOINTMENT (OUTPATIENT)
Dept: LAB | Facility: HOSPITAL | Age: 34
End: 2019-04-08
Payer: COMMERCIAL

## 2019-04-08 DIAGNOSIS — R79.89 LOW SERUM PROGESTERONE: Primary | ICD-10-CM

## 2019-04-08 DIAGNOSIS — Z3A.01 LESS THAN 8 WEEKS GESTATION OF PREGNANCY: Primary | ICD-10-CM

## 2019-04-08 DIAGNOSIS — Z3A.01 LESS THAN 8 WEEKS GESTATION OF PREGNANCY: ICD-10-CM

## 2019-04-08 LAB
B-HCG SERPL-ACNC: 7 MIU/ML
PROGEST SERPL-MCNC: 6.2 NG/ML

## 2019-04-08 PROCEDURE — 84702 CHORIONIC GONADOTROPIN TEST: CPT

## 2019-04-08 PROCEDURE — 84144 ASSAY OF PROGESTERONE: CPT

## 2019-04-08 PROCEDURE — 36415 COLL VENOUS BLD VENIPUNCTURE: CPT

## 2019-04-10 ENCOUNTER — APPOINTMENT (OUTPATIENT)
Dept: LAB | Facility: HOSPITAL | Age: 34
End: 2019-04-10
Payer: COMMERCIAL

## 2019-04-10 ENCOUNTER — TRANSCRIBE ORDERS (OUTPATIENT)
Dept: LAB | Facility: HOSPITAL | Age: 34
End: 2019-04-10

## 2019-04-10 DIAGNOSIS — Z3A.01 LESS THAN 8 WEEKS GESTATION OF PREGNANCY: ICD-10-CM

## 2019-04-10 DIAGNOSIS — Z3A.01 LESS THAN 8 WEEKS GESTATION OF PREGNANCY: Primary | ICD-10-CM

## 2019-04-10 LAB
B-HCG SERPL-ACNC: 2 MIU/ML
PROGEST SERPL-MCNC: 8.5 NG/ML

## 2019-04-10 PROCEDURE — 84144 ASSAY OF PROGESTERONE: CPT

## 2019-04-10 PROCEDURE — 36415 COLL VENOUS BLD VENIPUNCTURE: CPT

## 2019-04-10 PROCEDURE — 84702 CHORIONIC GONADOTROPIN TEST: CPT

## 2019-08-19 ENCOUNTER — APPOINTMENT (OUTPATIENT)
Dept: LAB | Age: 34
End: 2019-08-19
Payer: COMMERCIAL

## 2019-08-19 DIAGNOSIS — Z32.01 POSITIVE PREGNANCY TEST: Primary | ICD-10-CM

## 2019-08-19 DIAGNOSIS — Z32.01 POSITIVE PREGNANCY TEST: ICD-10-CM

## 2019-08-19 LAB
B-HCG SERPL-ACNC: <2 MIU/ML
PROGEST SERPL-MCNC: 5.5 NG/ML

## 2019-08-19 PROCEDURE — 36415 COLL VENOUS BLD VENIPUNCTURE: CPT

## 2019-08-19 PROCEDURE — 84144 ASSAY OF PROGESTERONE: CPT

## 2019-08-19 PROCEDURE — 84702 CHORIONIC GONADOTROPIN TEST: CPT

## 2019-08-26 ENCOUNTER — APPOINTMENT (OUTPATIENT)
Dept: LAB | Age: 34
End: 2019-08-26
Payer: COMMERCIAL

## 2019-08-26 DIAGNOSIS — Z32.01 POSITIVE PREGNANCY TEST: ICD-10-CM

## 2019-08-26 LAB — B-HCG SERPL-ACNC: <2 MIU/ML

## 2019-08-26 PROCEDURE — 36415 COLL VENOUS BLD VENIPUNCTURE: CPT

## 2019-08-26 PROCEDURE — 84702 CHORIONIC GONADOTROPIN TEST: CPT

## 2019-08-27 ENCOUNTER — TELEPHONE (OUTPATIENT)
Dept: OBGYN CLINIC | Facility: CLINIC | Age: 34
End: 2019-08-27

## 2019-11-14 ENCOUNTER — TELEPHONE (OUTPATIENT)
Dept: OBGYN CLINIC | Facility: CLINIC | Age: 34
End: 2019-11-14

## 2019-11-14 ENCOUNTER — APPOINTMENT (OUTPATIENT)
Dept: LAB | Age: 34
End: 2019-11-14
Payer: COMMERCIAL

## 2019-11-14 DIAGNOSIS — N92.6 IRREGULAR MENSTRUAL BLEEDING: Primary | ICD-10-CM

## 2019-11-14 DIAGNOSIS — N92.6 IRREGULAR MENSTRUAL BLEEDING: ICD-10-CM

## 2019-11-14 LAB
B-HCG SERPL-ACNC: <2 MIU/ML
PROGEST SERPL-MCNC: 0.6 NG/ML
T4 FREE SERPL-MCNC: 1.05 NG/DL (ref 0.76–1.46)
TSH SERPL DL<=0.05 MIU/L-ACNC: 1.48 UIU/ML (ref 0.36–3.74)

## 2019-11-14 PROCEDURE — 84443 ASSAY THYROID STIM HORMONE: CPT

## 2019-11-14 PROCEDURE — 84144 ASSAY OF PROGESTERONE: CPT

## 2019-11-14 PROCEDURE — 84702 CHORIONIC GONADOTROPIN TEST: CPT

## 2019-11-14 PROCEDURE — 36415 COLL VENOUS BLD VENIPUNCTURE: CPT

## 2019-11-14 PROCEDURE — 84439 ASSAY OF FREE THYROXINE: CPT

## 2019-11-14 NOTE — TELEPHONE ENCOUNTER
Pt called with bleeding for the last 16 days  Pt states her period started on 10/29, bleeding started out with spotting dark brown for 4-5 days  Bleeding then turned into normal period and the last couple days has been bright red with noticeable clots  Pt states cramping is normal but there is a focus on the right side but very mild  Denies any sharp pains  Two months ago pt was being followed by [facility] for IUI  Pt took UPT on 11/2 which was negative  Pt is currently taking 50 mg Synthroid  Advised pt to get lab work TSH, T4 Free, Prog, HCG as well as U/S  Provided central scheduling phone number  T&S 12/2017 - AB positive

## 2019-11-15 ENCOUNTER — HOSPITAL ENCOUNTER (OUTPATIENT)
Dept: RADIOLOGY | Age: 34
Discharge: HOME | End: 2019-11-15
Payer: COMMERCIAL

## 2019-11-15 DIAGNOSIS — N92.6 IRREGULAR MENSES: ICD-10-CM

## 2019-11-15 PROCEDURE — 76830 TRANSVAGINAL US NON-OB: CPT

## 2019-11-15 PROCEDURE — 76856 US EXAM PELVIC COMPLETE: CPT

## 2019-11-20 ENCOUNTER — HOSPITAL ENCOUNTER (EMERGENCY)
Facility: HOSPITAL | Age: 34
Discharge: HOME | End: 2019-11-20
Attending: EMERGENCY MEDICINE | Admitting: EMERGENCY MEDICINE
Payer: COMMERCIAL

## 2019-11-20 VITALS
TEMPERATURE: 97.8 F | BODY MASS INDEX: 31.41 KG/M2 | RESPIRATION RATE: 18 BRPM | HEART RATE: 89 BPM | DIASTOLIC BLOOD PRESSURE: 71 MMHG | WEIGHT: 160 LBS | HEIGHT: 60 IN | SYSTOLIC BLOOD PRESSURE: 132 MMHG | OXYGEN SATURATION: 97 %

## 2019-11-20 DIAGNOSIS — N93.8 DUB (DYSFUNCTIONAL UTERINE BLEEDING): Primary | ICD-10-CM

## 2019-11-20 LAB
ANION GAP SERPL CALCULATED.3IONS-SCNC: 6 MMOL/L (ref 4–13)
BACTERIA UR QL AUTO: ABNORMAL /HPF
BASOPHILS # BLD AUTO: 0.04 THOUSANDS/ΜL (ref 0–0.1)
BASOPHILS NFR BLD AUTO: 0 % (ref 0–1)
BILIRUB UR QL STRIP: NEGATIVE
BUN SERPL-MCNC: 10 MG/DL (ref 5–25)
CALCIUM SERPL-MCNC: 9 MG/DL (ref 8.3–10.1)
CHLORIDE SERPL-SCNC: 109 MMOL/L (ref 100–108)
CLARITY UR: CLEAR
CO2 SERPL-SCNC: 26 MMOL/L (ref 21–32)
COLOR UR: YELLOW
CREAT SERPL-MCNC: 0.78 MG/DL (ref 0.6–1.3)
EOSINOPHIL # BLD AUTO: 0.1 THOUSAND/ΜL (ref 0–0.61)
EOSINOPHIL NFR BLD AUTO: 1 % (ref 0–6)
ERYTHROCYTE [DISTWIDTH] IN BLOOD BY AUTOMATED COUNT: 14.1 % (ref 11.6–15.1)
EXT PREG TEST URINE: NEGATIVE
EXT. CONTROL ED NAV: NORMAL
GFR SERPL CREATININE-BSD FRML MDRD: 99 ML/MIN/1.73SQ M
GLUCOSE SERPL-MCNC: 111 MG/DL (ref 65–140)
GLUCOSE UR STRIP-MCNC: NEGATIVE MG/DL
HCT VFR BLD AUTO: 40.9 % (ref 34.8–46.1)
HGB BLD-MCNC: 13.2 G/DL (ref 11.5–15.4)
HGB UR QL STRIP.AUTO: ABNORMAL
HYALINE CASTS #/AREA URNS LPF: ABNORMAL /LPF
IMM GRANULOCYTES # BLD AUTO: 0.04 THOUSAND/UL (ref 0–0.2)
IMM GRANULOCYTES NFR BLD AUTO: 0 % (ref 0–2)
KETONES UR STRIP-MCNC: NEGATIVE MG/DL
LEUKOCYTE ESTERASE UR QL STRIP: NEGATIVE
LYMPHOCYTES # BLD AUTO: 3.61 THOUSANDS/ΜL (ref 0.6–4.47)
LYMPHOCYTES NFR BLD AUTO: 38 % (ref 14–44)
MCH RBC QN AUTO: 28.5 PG (ref 26.8–34.3)
MCHC RBC AUTO-ENTMCNC: 32.3 G/DL (ref 31.4–37.4)
MCV RBC AUTO: 88 FL (ref 82–98)
MONOCYTES # BLD AUTO: 0.53 THOUSAND/ΜL (ref 0.17–1.22)
MONOCYTES NFR BLD AUTO: 6 % (ref 4–12)
NEUTROPHILS # BLD AUTO: 5.3 THOUSANDS/ΜL (ref 1.85–7.62)
NEUTS SEG NFR BLD AUTO: 55 % (ref 43–75)
NITRITE UR QL STRIP: NEGATIVE
NON-SQ EPI CELLS URNS QL MICRO: ABNORMAL /HPF
NRBC BLD AUTO-RTO: 0 /100 WBCS
PH UR STRIP.AUTO: 8.5 [PH] (ref 4.5–8)
PLATELET # BLD AUTO: 297 THOUSANDS/UL (ref 149–390)
PMV BLD AUTO: 10 FL (ref 8.9–12.7)
POTASSIUM SERPL-SCNC: 3.8 MMOL/L (ref 3.5–5.3)
PROT UR STRIP-MCNC: NEGATIVE MG/DL
RBC # BLD AUTO: 4.63 MILLION/UL (ref 3.81–5.12)
RBC #/AREA URNS AUTO: ABNORMAL /HPF
SODIUM SERPL-SCNC: 141 MMOL/L (ref 136–145)
SP GR UR STRIP.AUTO: 1.02 (ref 1–1.03)
UROBILINOGEN UR QL STRIP.AUTO: 0.2 E.U./DL
WBC # BLD AUTO: 9.62 THOUSAND/UL (ref 4.31–10.16)
WBC #/AREA URNS AUTO: ABNORMAL /HPF

## 2019-11-20 PROCEDURE — 99284 EMERGENCY DEPT VISIT MOD MDM: CPT | Performed by: EMERGENCY MEDICINE

## 2019-11-20 PROCEDURE — 80048 BASIC METABOLIC PNL TOTAL CA: CPT | Performed by: EMERGENCY MEDICINE

## 2019-11-20 PROCEDURE — 81025 URINE PREGNANCY TEST: CPT | Performed by: EMERGENCY MEDICINE

## 2019-11-20 PROCEDURE — 81001 URINALYSIS AUTO W/SCOPE: CPT

## 2019-11-20 PROCEDURE — 99284 EMERGENCY DEPT VISIT MOD MDM: CPT

## 2019-11-20 PROCEDURE — 36415 COLL VENOUS BLD VENIPUNCTURE: CPT | Performed by: EMERGENCY MEDICINE

## 2019-11-20 PROCEDURE — 85025 COMPLETE CBC W/AUTO DIFF WBC: CPT | Performed by: EMERGENCY MEDICINE

## 2019-11-20 RX ORDER — NAPROXEN 500 MG/1
500 TABLET ORAL 2 TIMES DAILY WITH MEALS
Qty: 14 TABLET | Refills: 0 | Status: SHIPPED | OUTPATIENT
Start: 2019-11-20 | End: 2020-06-23 | Stop reason: ALTCHOICE

## 2019-11-20 RX ORDER — MAGNESIUM OXIDE/MAG AA CHELATE 300 MG
300 CAPSULE ORAL DAILY
COMMUNITY
End: 2020-06-23 | Stop reason: ALTCHOICE

## 2019-11-20 RX ORDER — LEVOTHYROXINE SODIUM 0.03 MG/1
25 TABLET ORAL DAILY
COMMUNITY
Start: 2019-09-01 | End: 2022-05-17 | Stop reason: ALTCHOICE

## 2019-11-20 RX ORDER — NAPROXEN 500 MG/1
500 TABLET ORAL ONCE
Status: COMPLETED | OUTPATIENT
Start: 2019-11-20 | End: 2019-11-20

## 2019-11-20 RX ADMIN — NAPROXEN 500 MG: 500 TABLET ORAL at 21:32

## 2019-11-21 NOTE — ED ATTENDING ATTESTATION
11/20/2019  I, Christa Galvan MD, saw and evaluated the patient  I have discussed the patient with the resident/non-physician practitioner and agree with the resident's/non-physician practitioner's findings, Plan of Care, and MDM as documented in the resident's/non-physician practitioner's note, except where noted  All available labs and Radiology studies were reviewed  I was present for key portions of any procedure(s) performed by the resident/non-physician practitioner and I was immediately available to provide assistance  At this point I agree with the current assessment done in the Emergency Department  I have conducted an independent evaluation of this patient a history and physical is as follows:    ED Course         Critical Care Time  Procedures     30 yo female with vaginal bleeding for twenty days, last night heavy, going through 3 pads/day  No abdominal pain, no cp, no syncope  Pt was getting in vitro few months ago  No blood thinners  Pt had had 3 miscarriages  Pt went to ob few days ago and had u/s showing small fibroid  Vss, afebrile, lungs cta, rrr, abdomen soft nontender  Labs, dysfunctional uterine bleeding  Urine, urine preg, offer progesterone treatment

## 2019-11-23 NOTE — ED PROVIDER NOTES
History  Chief Complaint   Patient presents with    Vaginal Bleeding     Patient reports excessive vaginal bleeding for 20 days  Was seen at [facility] and had ultrasound and blood work done  States she was told to come to ED if excessive bleeding persists  Patient also reports her joints ache  Patient is a 70-year-old female who is otherwise healthy that presents with vaginal bleeding  Patient says that she has had approximately 20 days of intermittent vaginal bleeding  She says that 20 days ago, she was due for her menstrual period when she began to have dark brownish discharge  This is typical for her  Typically her periods come every 30 days and last for 9 or 10 days  She says that this menstrual period as continued  She says that she has been going through about 3 pads per day of bright red blood from her vagina  She says last night was specifically heavy with about 3 pads in 1 hour  Today she has used 3 pads the entire day  She denies any associated abdominal pain but does complain of typical abdominal cramping associated with her menstrual periods  She denies signs or symptoms of anemia including lightheadedness, syncope, chest pain, dyspnea  She is not on any blood thinners or antiplatelet agents  Patient denies any trauma to the area recently  She was recently seen by her obstetrician who ordered a transvaginal ultrasound which showed a small fibroid in the uterus  Prior to Admission Medications   Prescriptions Last Dose Informant Patient Reported? Taking?    Magnesium 300 MG CAPS   Yes No   Sig: Take 300 mg by mouth daily   levothyroxine (SYNTHROID) 50 mcg tablet   Yes Yes   Sig: Take 50 mcg by mouth daily   progesterone (PROMETRIUM) 200 MG capsule   No No   Sig: Take 1 capsule (200 mg total) by mouth daily at bedtime      Facility-Administered Medications: None       Past Medical History:   Diagnosis Date    Disease of thyroid gland     History of PCOS     Pre-diabetes        Past Surgical History:   Procedure Laterality Date    REDUCTION MAMMAPLASTY      WISDOM TOOTH EXTRACTION         Family History   Problem Relation Age of Onset    Diabetes Mother     Migraines Mother     Polycystic ovary syndrome Mother     Thyroid disease Mother     Thyroid disease Sister     Diabetes Maternal Grandmother     Thyroid disease Maternal Grandmother     Thyroid disease Maternal Aunt     Melanoma Family      I have reviewed and agree with the history as documented  Social History     Tobacco Use    Smoking status: Never Smoker    Smokeless tobacco: Never Used   Substance Use Topics    Alcohol use: No    Drug use: No        Review of Systems   Constitutional: Negative for chills, diaphoresis, fatigue and fever  HENT: Negative for facial swelling, sore throat and trouble swallowing  Respiratory: Negative for cough, chest tightness, shortness of breath and wheezing  Cardiovascular: Negative for chest pain  Gastrointestinal: Negative for abdominal distention, abdominal pain, diarrhea, nausea and vomiting  Genitourinary: Positive for vaginal bleeding  Negative for dysuria  Musculoskeletal: Negative for back pain, neck pain and neck stiffness  Skin: Negative for color change, pallor, rash and wound  Neurological: Negative for weakness, light-headedness and numbness  Psychiatric/Behavioral: Negative for agitation  All other systems reviewed and are negative        Physical Exam  ED Triage Vitals [11/20/19 1957]   Temperature Pulse Respirations Blood Pressure SpO2   97 8 °F (36 6 °C) 93 20 (!) 173/82 96 %      Temp Source Heart Rate Source Patient Position - Orthostatic VS BP Location FiO2 (%)   Oral Monitor Sitting Left arm --      Pain Score       7             Orthostatic Vital Signs  Vitals:    11/20/19 1957 11/20/19 2133   BP: (!) 173/82 132/71   Pulse: 93 89   Patient Position - Orthostatic VS: Sitting Sitting       Physical Exam   Constitutional: She is oriented to person, place, and time  She appears well-developed and well-nourished  No distress  HENT:   Head: Normocephalic  Eyes: Pupils are equal, round, and reactive to light  Neck: Normal range of motion  Neck supple  Cardiovascular: Normal rate, regular rhythm, normal heart sounds and intact distal pulses  Pulmonary/Chest: Effort normal and breath sounds normal    Abdominal: Soft  Bowel sounds are normal  She exhibits no distension  There is no tenderness  There is no guarding  Abdomen is soft, nondistended, nontender  No rebound tenderness or guarding is noted  No masses palpated  Normal bowel sounds  Musculoskeletal: Normal range of motion  She exhibits no edema, tenderness or deformity  Neurological: She is alert and oriented to person, place, and time  No cranial nerve deficit or sensory deficit  Skin: Skin is warm and dry  Capillary refill takes less than 2 seconds  Psychiatric: She has a normal mood and affect  Her behavior is normal  Judgment and thought content normal    Vitals reviewed        ED Medications  Medications   naproxen (NAPROSYN) tablet 500 mg (500 mg Oral Given 11/20/19 2132)       Diagnostic Studies  Results Reviewed     Procedure Component Value Units Date/Time    Urine Microscopic [[id number]]  (Abnormal) Collected:  11/20/19 2117    Lab Status:  Final result Specimen:  Urine, Clean Catch Updated:  11/20/19 2127     RBC, UA Innumerable /hpf      WBC, UA 2-4 /hpf      Epithelial Cells Moderate /hpf      Bacteria, UA Occasional /hpf      Hyaline Casts, UA None Seen /lpf     POCT pregnancy, urine [[id number]]  (Normal) Resulted:  11/20/19 2118    Lab Status:  Final result Updated:  11/20/19 2118     EXT PREG TEST UR (Ref: Negative) negative     Control valid    POCT urinalysis dipstick [[id number]]  (Normal) Resulted:  11/20/19 2118    Lab Status:  Final result Updated:  11/20/19 2118    Urine Macroscopic, POC [[id number]]  (Abnormal) Collected:  11/20/19 2117    Lab Status: Final result Specimen:  Urine Updated:  11/20/19 2117     Color, UA Yellow     Clarity, UA Clear     pH, UA 8 5     Leukocytes, UA Negative     Nitrite, UA Negative     Protein, UA Negative mg/dl      Glucose, UA Negative mg/dl      Ketones, UA Negative mg/dl      Urobilinogen, UA 0 2 E U /dl      Bilirubin, UA Negative     Blood, UA Large     Specific Malone, UA 1 020    Narrative:       CLINITEK RESULT    Basic metabolic panel [[id number]]  (Abnormal) Collected:  11/20/19 2049    Lab Status:  Final result Specimen:  Blood from Arm, Left Updated:  11/20/19 2109     Sodium 141 mmol/L      Potassium 3 8 mmol/L      Chloride 109 mmol/L      CO2 26 mmol/L      ANION GAP 6 mmol/L      BUN 10 mg/dL      Creatinine 0 78 mg/dL      Glucose 111 mg/dL      Calcium 9 0 mg/dL      eGFR 99 ml/min/1 73sq m     Narrative:       [facility] guidelines for Chronic Kidney Disease (CKD):     Stage 1 with normal or high GFR (GFR > 90 mL/min/1 73 square meters)    Stage 2 Mild CKD (GFR = 60-89 mL/min/1 73 square meters)    Stage 3A Moderate CKD (GFR = 45-59 mL/min/1 73 square meters)    Stage 3B Moderate CKD (GFR = 30-44 mL/min/1 73 square meters)    Stage 4 Severe CKD (GFR = 15-29 mL/min/1 73 square meters)    Stage 5 End Stage CKD (GFR <15 mL/min/1 73 square meters)  Note: GFR calculation is accurate only with a steady state creatinine    CBC and differential [[id number]] Collected:  11/20/19 2049    Lab Status:  Final result Specimen:  Blood from Arm, Left Updated:  11/20/19 2059     WBC 9 62 Thousand/uL      RBC 4 63 Million/uL      Hemoglobin 13 2 g/dL      Hematocrit 40 9 %      MCV 88 fL      MCH 28 5 pg      MCHC 32 3 g/dL      RDW 14 1 %      MPV 10 0 fL      Platelets 142 Thousands/uL      nRBC 0 /100 WBCs      Neutrophils Relative 55 %      Immat GRANS % 0 %      Lymphocytes Relative 38 %      Monocytes Relative 6 %      Eosinophils Relative 1 %      Basophils Relative 0 %      Neutrophils Absolute 5 30 Thousands/µL      Immature Grans Absolute 0 04 Thousand/uL      Lymphocytes Absolute 3 61 Thousands/µL      Monocytes Absolute 0 53 Thousand/µL      Eosinophils Absolute 0 10 Thousand/µL      Basophils Absolute 0 04 Thousands/µL                  No orders to display         Procedures  Procedures        ED Course                               MDM  Number of Diagnoses or Management Options  DUB (dysfunctional uterine bleeding): established and worsening  Diagnosis management comments: Patient is a 71-year-old female who presents with menorrhagia found to be due to dysfunctional uterine bleeding  Hemoglobin was at her baseline  Patient was started on naproxen for dysfunctional uterine bleeding with follow-up to her obstetrician  Patient was advised to return to care if she has new or worsening symptoms  Amount and/or Complexity of Data Reviewed  Clinical lab tests: ordered and reviewed  Tests in the radiology section of CPT®: ordered and reviewed    Risk of Complications, Morbidity, and/or Mortality  Presenting problems: moderate  Diagnostic procedures: low  Management options: low    Patient Progress  Patient progress: stable      Disposition  Final diagnoses:   DUB (dysfunctional uterine bleeding)     Time reflects when diagnosis was documented in both MDM as applicable and the Disposition within this note     Time User Action Codes Description Comment    11/20/2019  9:24 PM Shaniqua Collins Add [N93 9] Vaginal bleeding     11/20/2019  9:24 PM Shaniqua Collins Remove [N93 9] Vaginal bleeding     11/20/2019  9:24 PM [facility], [address] [facility] [N93 8] DUB (dysfunctional uterine bleeding)       ED Disposition     ED Disposition Condition Date/Time Comment    Discharge Stable Wed Nov 20, 2019  9:24 PM Marc Gonzalez discharge to home/self care              Follow-up Information     Follow up With Specialties Details Why Contact Info Additional [address] Emergency Department Emergency Medicine  If symptoms worsen [address]  [phone]  ED, [address] [facility], [place], [place], [facility] [address]          Discharge Medication List as of 11/20/2019  9:25 PM      START taking these medications    Details   naproxen (NAPROSYN) 500 mg tablet Take 1 tablet (500 mg total) by mouth 2 (two) times a day with meals for 7 days, Starting Wed 11/20/2019, Until Wed 11/27/2019, Print         CONTINUE these medications which have NOT CHANGED    Details   levothyroxine (SYNTHROID) 50 mcg tablet Take 50 mcg by mouth daily, Starting Sun 9/1/2019, Historical Med      Magnesium 300 MG CAPS Take 300 mg by mouth daily, Historical Med      progesterone (PROMETRIUM) 200 MG capsule Take 1 capsule (200 mg total) by mouth daily at bedtime, Starting Mon 4/8/2019, Normal           No discharge procedures on file  ED Provider  Attending physically available and evaluated [facility] SERVICE  I managed the patient along with the ED Attending      Electronically Signed by         Sandy Marti MD  11/23/19 7401

## 2019-11-25 ENCOUNTER — VBI (OUTPATIENT)
Dept: ADMINISTRATIVE | Facility: OTHER | Age: 34
End: 2019-11-25

## 2020-06-23 ENCOUNTER — TELEMEDICINE (OUTPATIENT)
Dept: OBGYN CLINIC | Facility: CLINIC | Age: 35
End: 2020-06-23

## 2020-06-23 ENCOUNTER — TELEPHONE (OUTPATIENT)
Dept: SURGICAL ONCOLOGY | Facility: CLINIC | Age: 35
End: 2020-06-23

## 2020-06-23 DIAGNOSIS — D68.51 FACTOR V LEIDEN (HCC): ICD-10-CM

## 2020-06-23 DIAGNOSIS — Z36.9 ANTENATAL SCREENING ENCOUNTER: Primary | ICD-10-CM

## 2020-06-23 DIAGNOSIS — Z34.82 PRENATAL CARE, SUBSEQUENT PREGNANCY, SECOND TRIMESTER: ICD-10-CM

## 2020-06-23 PROCEDURE — OBC: Performed by: NURSE PRACTITIONER

## 2020-06-23 RX ORDER — ASPIRIN 81 MG/1
81 TABLET ORAL DAILY
Status: ON HOLD | COMMUNITY
End: 2020-09-15 | Stop reason: ALTCHOICE

## 2020-06-29 ENCOUNTER — TELEPHONE (OUTPATIENT)
Dept: OBGYN CLINIC | Facility: CLINIC | Age: 35
End: 2020-06-29

## 2020-07-06 ENCOUNTER — APPOINTMENT (OUTPATIENT)
Dept: LAB | Age: 35
End: 2020-07-06
Payer: COMMERCIAL

## 2020-07-06 DIAGNOSIS — Z36.9 ANTENATAL SCREENING ENCOUNTER: ICD-10-CM

## 2020-07-06 LAB
ABO GROUP BLD: NORMAL
BACTERIA UR QL AUTO: ABNORMAL /HPF
BASOPHILS # BLD AUTO: 0.03 THOUSANDS/ΜL (ref 0–0.1)
BASOPHILS NFR BLD AUTO: 0 % (ref 0–1)
BILIRUB UR QL STRIP: NEGATIVE
BLD GP AB SCN SERPL QL: NEGATIVE
CLARITY UR: ABNORMAL
COLOR UR: YELLOW
EOSINOPHIL # BLD AUTO: 0.09 THOUSAND/ΜL (ref 0–0.61)
EOSINOPHIL NFR BLD AUTO: 1 % (ref 0–6)
ERYTHROCYTE [DISTWIDTH] IN BLOOD BY AUTOMATED COUNT: 14.2 % (ref 11.6–15.1)
GLUCOSE UR STRIP-MCNC: NEGATIVE MG/DL
HCT VFR BLD AUTO: 31.7 % (ref 34.8–46.1)
HGB BLD-MCNC: 10 G/DL (ref 11.5–15.4)
HGB UR QL STRIP.AUTO: NEGATIVE
IMM GRANULOCYTES # BLD AUTO: 0.11 THOUSAND/UL (ref 0–0.2)
IMM GRANULOCYTES NFR BLD AUTO: 1 % (ref 0–2)
KETONES UR STRIP-MCNC: NEGATIVE MG/DL
LEUKOCYTE ESTERASE UR QL STRIP: NEGATIVE
LYMPHOCYTES # BLD AUTO: 1.77 THOUSANDS/ΜL (ref 0.6–4.47)
LYMPHOCYTES NFR BLD AUTO: 20 % (ref 14–44)
MCH RBC QN AUTO: 27.3 PG (ref 26.8–34.3)
MCHC RBC AUTO-ENTMCNC: 31.5 G/DL (ref 31.4–37.4)
MCV RBC AUTO: 87 FL (ref 82–98)
MONOCYTES # BLD AUTO: 0.66 THOUSAND/ΜL (ref 0.17–1.22)
MONOCYTES NFR BLD AUTO: 7 % (ref 4–12)
NEUTROPHILS # BLD AUTO: 6.24 THOUSANDS/ΜL (ref 1.85–7.62)
NEUTS SEG NFR BLD AUTO: 71 % (ref 43–75)
NITRITE UR QL STRIP: NEGATIVE
NON-SQ EPI CELLS URNS QL MICRO: ABNORMAL /HPF
NRBC BLD AUTO-RTO: 0 /100 WBCS
PH UR STRIP.AUTO: 6 [PH]
PLATELET # BLD AUTO: 256 THOUSANDS/UL (ref 149–390)
PMV BLD AUTO: 10.4 FL (ref 8.9–12.7)
PROT UR STRIP-MCNC: NEGATIVE MG/DL
RBC # BLD AUTO: 3.66 MILLION/UL (ref 3.81–5.12)
RBC #/AREA URNS AUTO: ABNORMAL /HPF
RH BLD: POSITIVE
SP GR UR STRIP.AUTO: 1.01 (ref 1–1.03)
TSH SERPL DL<=0.05 MIU/L-ACNC: 2.02 UIU/ML (ref 0.36–3.74)
UROBILINOGEN UR QL STRIP.AUTO: 0.2 E.U./DL
WBC # BLD AUTO: 8.9 THOUSAND/UL (ref 4.31–10.16)
WBC #/AREA URNS AUTO: ABNORMAL /HPF

## 2020-07-06 PROCEDURE — 80081 OBSTETRIC PANEL INC HIV TSTG: CPT | Performed by: NURSE PRACTITIONER

## 2020-07-06 PROCEDURE — 84443 ASSAY THYROID STIM HORMONE: CPT

## 2020-07-06 PROCEDURE — 81001 URINALYSIS AUTO W/SCOPE: CPT | Performed by: NURSE PRACTITIONER

## 2020-07-06 PROCEDURE — 87086 URINE CULTURE/COLONY COUNT: CPT | Performed by: NURSE PRACTITIONER

## 2020-07-06 PROCEDURE — 36415 COLL VENOUS BLD VENIPUNCTURE: CPT | Performed by: NURSE PRACTITIONER

## 2020-07-07 LAB
HBV SURFACE AG SER QL: NORMAL
HIV 1+2 AB+HIV1 P24 AG SERPL QL IA: NORMAL
RPR SER QL: NORMAL
RUBV IGG SERPL IA-ACNC: >175 IU/ML

## 2020-07-08 ENCOUNTER — TELEPHONE (OUTPATIENT)
Dept: OBGYN CLINIC | Facility: CLINIC | Age: 35
End: 2020-07-08

## 2020-07-08 ENCOUNTER — TELEPHONE (OUTPATIENT)
Dept: PERINATAL CARE | Facility: CLINIC | Age: 35
End: 2020-07-08

## 2020-07-08 LAB — BACTERIA UR CULT: NORMAL

## 2020-07-08 NOTE — TELEPHONE ENCOUNTER
----- Message from Key Purcell, [address] sent at 7/7/2020  9:12 AM EDT -----  Please inform patient of low hemoglobin and advise to supplement with OTC ferrous sulfate

## 2020-07-08 NOTE — TELEPHONE ENCOUNTER
-------------------------------------------------------------    Attempted to reach patient by phone and left voicemail to confirm appointment for MFM ultrasound  1 support person ( must be over the age of 15) may accompany you for your appointment  You must wear a mask (covering nose and mouth) during your visit  You and your support person will be screened upon arrival   IF not feeling well- cough, fever, shortness of breath or any flu like symptoms contact your primary care physician or [phone]  Please call our office prior to entering the building  Check in and rooming questions will be done via phone  Inside office # provided:  Alejandro Dupont line: [phone]  [place] line:  [phone]  [facility] line:  [address] line:  [phone]  Sanam Teran line:  [phone]  Central line:  [phone]    [facility] does not allow cell phone use, recording device or streaming during ultrasound     Any questions with these instructions please call Maternal Fetal Medicine nurse line today @ # [phone]

## 2020-07-09 ENCOUNTER — ROUTINE PRENATAL (OUTPATIENT)
Dept: PERINATAL CARE | Facility: OTHER | Age: 35
End: 2020-07-09
Payer: COMMERCIAL

## 2020-07-09 VITALS
HEIGHT: 60 IN | HEART RATE: 111 BPM | SYSTOLIC BLOOD PRESSURE: 118 MMHG | TEMPERATURE: 99.2 F | BODY MASS INDEX: 35.36 KG/M2 | DIASTOLIC BLOOD PRESSURE: 79 MMHG | WEIGHT: 180.12 LBS

## 2020-07-09 DIAGNOSIS — Z36.9 ANTENATAL SCREENING ENCOUNTER: ICD-10-CM

## 2020-07-09 DIAGNOSIS — Z3A.26 26 WEEKS GESTATION OF PREGNANCY: ICD-10-CM

## 2020-07-09 DIAGNOSIS — Z36.89 ENCOUNTER FOR FETAL ANATOMIC SURVEY: ICD-10-CM

## 2020-07-09 DIAGNOSIS — O99.212 OBESITY AFFECTING PREGNANCY IN SECOND TRIMESTER: ICD-10-CM

## 2020-07-09 PROBLEM — O09.812 PREGNANCY RESULTING FROM IN VITRO FERTILIZATION IN SECOND TRIMESTER: Status: ACTIVE | Noted: 2020-07-09

## 2020-07-09 PROBLEM — N91.2 AMENORRHEA: Status: RESOLVED | Noted: 2017-12-12 | Resolved: 2020-07-09

## 2020-07-09 PROBLEM — Z01.419 WELL WOMAN EXAM: Status: RESOLVED | Noted: 2019-03-25 | Resolved: 2020-07-09

## 2020-07-09 PROBLEM — O09.529 AMA (ADVANCED MATERNAL AGE) MULTIGRAVIDA 35+: Status: ACTIVE | Noted: 2020-07-09

## 2020-07-09 PROBLEM — Z31.9 INFERTILITY MANAGEMENT: Status: RESOLVED | Noted: 2019-03-25 | Resolved: 2020-07-09

## 2020-07-09 PROCEDURE — 99242 OFF/OP CONSLTJ NEW/EST SF 20: CPT | Performed by: OBSTETRICS & GYNECOLOGY

## 2020-07-09 PROCEDURE — 76811 OB US DETAILED SNGL FETUS: CPT | Performed by: OBSTETRICS & GYNECOLOGY

## 2020-07-09 NOTE — PROGRESS NOTES
[address] [place] [address]: Ms Sunitha Negron was seen today at 26w1d for anatomic survey ultrasound  See ultrasound report under "OB Procedures" tab  My recommendations are as follows:    1  Fetal pyelectasis: We discussed the finding of fetal renal pyelectasis (defined as fetal anterior-posterior renal pelvis diameter >4mm up to 28 weeks, and >7mm beyond 28 weeks gestation) seen during today's study  Fetal Urinary tract dilation(UTD) is classified as follows:-UTD A1: Central dilation of renal pelvis 10-15mm, considered low risk for complications-UTD A2: >73DJ renal pelvis dilation, peripheral calyceal dilation and/or abnormal ureters, normal renal parenchyma-UTD A3: Any parenchymal or bladder abnormalityFetal pyelectasis is often transient (especially when mild), and is observed in up to 2% of fetuses at the anatomic survey  It is more common in males than females  Causes of pyelectasis include normal variant, hormonal, structural (LUTO, UPJ obstruction, ureterocele), functional (vesicoureteral reflux)  Complex genitourinary anomalies such as cloaca can also result in urinary tract dilation  Fetal renal pyelectasis is also a soft marker for fetal aneuploidy, and is loosely associated with Trisomy 21, though many genetically normal fetuses also have pyelectasis  We discussed the option of prenatal genetic screening/testing for aneuploidy, including amniocentesis, which she declines  Genetic counseling is available to further discuss her screening/testing options as desired  I recommend re-evaluation of the fetal kidneys (as well as growth and sub-optimally seen anatomy) at 28 weeks gestation  If persistent fetal pyelectasis is seen, pediatric nephrology consultation would be arranged prenatally to discuss need for treatment postnatally, if any  This finding prenatally does not influence timing or route of delivery, or necessitate  critical care after birth       2  Obesity in pregnancy (defined as body mass index > 30 kg/m2) is associated with an increased risk of several pregnancy complications, including hypertensive disorders, diabetes, abnormal fetal growth, fetal malformations  The risk of  delivery is also increased, as are wound complications in the event of  delivery  A healthy diet and exercise, as well as appropriate gestational weight gain (no more than 11-20 pounds) can help reduce risk of these complications  150 minutes of moderate exercise per week is recommended for all pregnant women  Nutrition counseling is also available if desired  Early screening for gestational diabetes is recommended, as well as routine re-screening at 24-28 weeks if early screening results are normal   fetal surveillance is also recommended as follows:BMI 30-40:  Evaluation of fetal growth at 32 weeks gestation  BMI >40: Evaluation of fetal growth at 28, 34 weeks gestation, as well as antepartum fetal surveillance once weekly beginning at 36 weeks gestation  3  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, which sounds like it was incidentally discovered during infertility workup  It is unclear if she is homozygous or heterozygous, but she has no personal or family history of thrombosis  If she is a factor 5 leiden heterozygote, surveillance only without anticoagulation is recommended antepartum, and post-partum surveillance or anticoagulation prophylaxis (if additional risk factors present) is recommended  If she is homozygous, then antepartum and postpartum anticoagulation are recommended (prophylactic or intermediate dose)  We reviewed some of the considerations around delivery while taking anticoagulation, and impact on her ability to receive regional anesthesia  She is worried about stopping the anticoagulation after her fertility doctor started it, and has an upcoming hematology appointment on 20   I encouraged her to discuss with the hematologist  If she is heterozygous for factor 5 leiden, without a personal or family history of thrombosis, I would not continue antepartum anticoagulation beyond 36 weeks gestation given impact on labor analgesia, especially if it is not necessary  We reviewed that postpartum is the most risky time for thrombosis, and that I would recommend at minimum 6 weeks of postpartum prophylactic anticoagulation  4  We reviewed her history of hypothyroidism, and management in pregnancy  The goal in pregnancy is to maintain a euthyroid maternal state, which is important for maternal health and fetal development  Thyroid function should be assessed at least each trimester using thyroid stimulating hormone (TSH) levels  After medication dose-adjustments, labs should be repeated every 4-6 weeks until euthyroid  Goal TSH levels in pregnancy recommended by the American Thyroid Association are 0 1-2 5 mIU/L (first trimester), 0 2-3 0 mIU/L (second trimester) and 0 3-3 0 mIU/L (third trimester)  Levothyroxine dose should be titrated to achieve these trimester-specific ranges  It is typical for women to require a dose increase of levothyroxine during pregnancy, often with a reduction needed postpartum  Although women with overt, uncontrolled hypothyroidism are at risk for adverse pregnancy outcomes (including miscarriage, preeclampsia,  birth, placental abruption, and stillbirth), adequate thyroid hormone replacement in pregnancy minimizes the risk of adverse outcomes  For patients who are well-controlled in pregnancy, no additional fetal surveillance is indicated beyond usual prenatal care  5  Pregnancies achieved through in vitro fertilization (IVF) are associated with a slight increase in risk of several pregnancy complications, including abnormal placentation,  delivery, hypertensive disorders of pregnancy, low birth weight, and congenital malformations (specifically congenital heart disease)   However, many of these risks are confounded by higher rates of multiple gestations, as well as medical complications among women undergoing IVF compared to those who conceive spontaneously  Fetal number and maternal comorbidities are stronger predictors of pregnancy outcome than method of conception itself  For women who achieve pregnancy through IVF, a screening fetal echocardiogram is recommended at 22 weeks gestation, in light of the slight increase in risk of congenital heart disease  Evaluation of fetal growth is recommended at 28 and 34 weeks gestation  Weekly non-stress testing at 36 weeks gestation is recommended for this indication       Please don't hesitate to contact our office with any concerns or questions   -Sue Dsouza MD

## 2020-07-09 NOTE — LETTER
July 9, 2020     Ofelia Ramos, [address]  [address] [place] [address]    Patient: Joseph Andrew   YOB: 1985   Date of Visit: 7/9/2020       Dear Dr Teodoro Pascual: Thank you for referring Joseph Andrew to me for evaluation  Below are my notes for this consultation  If you have questions, please do not hesitate to call me  I look forward to following your patient along with you  Sincerely,        Luis Villela MD        CC: No Recipients  Luis Villela MD  7/9/2020  6:51 PM  Sign at close encounter  [address]: Ms Bita Jay was seen today at 26w1d for anatomic survey ultrasound  See ultrasound report under "OB Procedures" tab  My recommendations are as follows:    1  Fetal pyelectasis: We discussed the finding of fetal renal pyelectasis (defined as fetal anterior-posterior renal pelvis diameter >4mm up to 28 weeks, and >7mm beyond 28 weeks gestation) seen during today's study  Fetal Urinary tract dilation(UTD) is classified as follows:-UTD A1: Central dilation of renal pelvis 10-15mm, considered low risk for complications-UTD A2: >04IG renal pelvis dilation, peripheral calyceal dilation and/or abnormal ureters, normal renal parenchyma-UTD A3: Any parenchymal or bladder abnormalityFetal pyelectasis is often transient (especially when mild), and is observed in up to 2% of fetuses at the anatomic survey  It is more common in males than females  Causes of pyelectasis include normal variant, hormonal, structural (LUTO, UPJ obstruction, ureterocele), functional (vesicoureteral reflux)  Complex genitourinary anomalies such as cloaca can also result in urinary tract dilation  Fetal renal pyelectasis is also a soft marker for fetal aneuploidy, and is loosely associated with Trisomy 21, though many genetically normal fetuses also have pyelectasis   We discussed the option of prenatal genetic screening/testing for aneuploidy, including amniocentesis, which she declines  Genetic counseling is available to further discuss her screening/testing options as desired  I recommend re-evaluation of the fetal kidneys (as well as growth and sub-optimally seen anatomy) at 28 weeks gestation  If persistent fetal pyelectasis is seen, pediatric nephrology consultation would be arranged prenatally to discuss need for treatment postnatally, if any  This finding prenatally does not influence timing or route of delivery, or necessitate  critical care after birth  2  Obesity in pregnancy (defined as body mass index > 30 kg/m2) is associated with an increased risk of several pregnancy complications, including hypertensive disorders, diabetes, abnormal fetal growth, fetal malformations  The risk of  delivery is also increased, as are wound complications in the event of  delivery  A healthy diet and exercise, as well as appropriate gestational weight gain (no more than 11-20 pounds) can help reduce risk of these complications  150 minutes of moderate exercise per week is recommended for all pregnant women  Nutrition counseling is also available if desired  Early screening for gestational diabetes is recommended, as well as routine re-screening at 24-28 weeks if early screening results are normal   fetal surveillance is also recommended as follows:BMI 30-40:  Evaluation of fetal growth at 32 weeks gestation  BMI >40: Evaluation of fetal growth at 28, 34 weeks gestation, as well as antepartum fetal surveillance once weekly beginning at 36 weeks gestation  3  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, which sounds like it was incidentally discovered during infertility workup  It is unclear if she is homozygous or heterozygous, but she has no personal or family history of thrombosis   If she is a factor 5 leiden heterozygote, surveillance only without anticoagulation is recommended antepartum, and post-partum surveillance or anticoagulation prophylaxis (if additional risk factors present) is recommended  If she is homozygous, then antepartum and postpartum anticoagulation are recommended (prophylactic or intermediate dose)  We reviewed some of the considerations around delivery while taking anticoagulation, and impact on her ability to receive regional anesthesia  She is worried about stopping the anticoagulation after her fertility doctor started it, and has an upcoming hematology appointment on 20  I encouraged her to discuss with the hematologist  If she is heterozygous for factor 5 leiden, without a personal or family history of thrombosis, I would not continue antepartum anticoagulation beyond 36 weeks gestation given impact on labor analgesia, especially if it is not necessary  We reviewed that postpartum is the most risky time for thrombosis, and that I would recommend at minimum 6 weeks of postpartum prophylactic anticoagulation  4  We reviewed her history of hypothyroidism, and management in pregnancy  The goal in pregnancy is to maintain a euthyroid maternal state, which is important for maternal health and fetal development  Thyroid function should be assessed at least each trimester using thyroid stimulating hormone (TSH) levels  After medication dose-adjustments, labs should be repeated every 4-6 weeks until euthyroid  Goal TSH levels in pregnancy recommended by the American Thyroid Association are 0 1-2 5 mIU/L (first trimester), 0 2-3 0 mIU/L (second trimester) and 0 3-3 0 mIU/L (third trimester)  Levothyroxine dose should be titrated to achieve these trimester-specific ranges  It is typical for women to require a dose increase of levothyroxine during pregnancy, often with a reduction needed postpartum    Although women with overt, uncontrolled hypothyroidism are at risk for adverse pregnancy outcomes (including miscarriage, preeclampsia,  birth, placental abruption, and stillbirth), adequate thyroid hormone replacement in pregnancy minimizes the risk of adverse outcomes  For patients who are well-controlled in pregnancy, no additional fetal surveillance is indicated beyond usual prenatal care  5  Pregnancies achieved through in vitro fertilization (IVF) are associated with a slight increase in risk of several pregnancy complications, including abnormal placentation,  delivery, hypertensive disorders of pregnancy, low birth weight, and congenital malformations (specifically congenital heart disease)  However, many of these risks are confounded by higher rates of multiple gestations, as well as medical complications among women undergoing IVF compared to those who conceive spontaneously  Fetal number and maternal comorbidities are stronger predictors of pregnancy outcome than method of conception itself  For women who achieve pregnancy through IVF, a screening fetal echocardiogram is recommended at 22 weeks gestation, in light of the slight increase in risk of congenital heart disease  Evaluation of fetal growth is recommended at 28 and 34 weeks gestation  Weekly non-stress testing at 36 weeks gestation is recommended for this indication       Please don't hesitate to contact our office with any concerns or questions   -Abrahan Anthony MD

## 2020-07-12 ENCOUNTER — HOSPITAL ENCOUNTER (OUTPATIENT)
Facility: HOSPITAL | Age: 35
End: 2020-07-12
Attending: OBSTETRICS & GYNECOLOGY | Admitting: OBSTETRICS & GYNECOLOGY
Payer: COMMERCIAL

## 2020-07-12 ENCOUNTER — NURSE TRIAGE (OUTPATIENT)
Dept: OTHER | Facility: OTHER | Age: 35
End: 2020-07-12

## 2020-07-12 ENCOUNTER — HOSPITAL ENCOUNTER (OUTPATIENT)
Facility: HOSPITAL | Age: 35
Discharge: HOME | End: 2020-07-12
Attending: OBSTETRICS & GYNECOLOGY | Admitting: OBSTETRICS & GYNECOLOGY
Payer: COMMERCIAL

## 2020-07-12 VITALS
HEIGHT: 60 IN | DIASTOLIC BLOOD PRESSURE: 60 MMHG | RESPIRATION RATE: 18 BRPM | SYSTOLIC BLOOD PRESSURE: 127 MMHG | BODY MASS INDEX: 35.34 KG/M2 | HEART RATE: 112 BPM | TEMPERATURE: 98.2 F | WEIGHT: 180 LBS

## 2020-07-12 PROCEDURE — 99214 OFFICE O/P EST MOD 30 MIN: CPT

## 2020-07-12 PROCEDURE — NC001 PR NO CHARGE: Performed by: OBSTETRICS & GYNECOLOGY

## 2020-07-12 PROCEDURE — 76815 OB US LIMITED FETUS(S): CPT | Performed by: OBSTETRICS & GYNECOLOGY

## 2020-07-12 PROCEDURE — G0463 HOSPITAL OUTPT CLINIC VISIT: HCPCS

## 2020-07-12 NOTE — TELEPHONE ENCOUNTER
Regarding: Baby not moving   ----- Message from Abner Garcia sent at 7/12/2020  1:19 PM EDT -----  "I am 26 weeks pregnant and I can't feel my baby moving " Please call patient back

## 2020-07-12 NOTE — PROGRESS NOTES
L&D Triage Note - OB/GYN  Classie Froilan 29 y o  female MRN: [id number]  Unit/Bed#: LD TRIAGE 4 Encounter: [id number]    Patient is seen by Dr Ismael Riddle is a 29 y o   at 26w4d who presents for decreased fetal movement  Modified BPP reactive, Baseline 130, mod variab, 10x10 accels, - decels  Cat 1 strip  TRESA 12 8    PLAN  #1  NST:   - Baseline 130, mod variab, 10x10 accels, - decels  Cat 1 strip  #2  TRESA  - 12 8, deepest pocket 5 cm    #3  Discharge instructions  · Patient instructed to call if experiencing worsening contractions, vaginal bleeding, loss of fluid or decreased fetal movement  · Will follow up with OBGYN on 20  D/w Dr Shamar Chi  ______________    SUBJECTIVE    BEATRIZ: Estimated Date of Delivery: 10/14/20    HPI:  29 y o  [place] 26w4d presents with complaint of decreased fetal movement  States baby is typically pretty active but she started feeling decreased movement around 0130  This is an [place] pregnancy done [place] ([facility]), hx of recurrent losses  Contractions: no  Leakage of fluid: no  Vaginal Bleeding: no  Fetal movement: present    Her current obstetrical history left fetal pyelectasis, factor V leiden, hypothyroidism, obesity    Her past obstetrical history is significant for recurrent losses      ROS:  Constitutional: Negative  Respiratory: Negative  Cardiovascular: Negative    Gastrointestinal: Negative    OBJECTIVE:  /60 (BP Location: Right arm)   Pulse (!) 112   Temp 98 2 °F (36 8 °C) (Oral)   Resp 18   Ht 5' (1 524 m)   Wt 81 6 kg (180 lb)   BMI 35 15 kg/m²   Body mass index is 35 15 kg/m²  Physical Exam   Constitutional: She is oriented to person, place, and time  She appears well-developed and well-nourished  Cardiovascular: Normal rate, regular rhythm and normal heart sounds  Pulmonary/Chest: Effort normal and breath sounds normal    Abdominal: Soft   Bowel sounds are normal    Genitourinary: Vagina normal    Neurological: She is alert and oriented to person, place, and time  Skin: Skin is warm and dry  Psychiatric: She has a normal mood and affect  Her behavior is normal        SVE: deferred       FHT:  Baseline Rate: 130 bpm  Variability: Moderate 6-25 bpm  Accelerations: 10 x 10 (<32 weeks), 15 x 15 or greater, At variable times  Decelerations: None  FHR Category: Category I    TOCO:   Contraction Frequency (minutes): none        TAUS   TRESA      - Q1 5 cm     - Q2 3 2cm     - Q3 1 7cm     - Q4 2 5cm     - Total: 12 4cm   Placenta: left lateral      Labs: No results found for this or any previous visit (from the past 24 hour(s))        Fredo Laird DO  OB/GYN PGY-1  7/12/2020  4:04 PM

## 2020-07-12 NOTE — TELEPHONE ENCOUNTER
Reason for Disposition   [1] Pregnant 23 or more weeks AND [2] no movement of baby > 8 hours    Answer Assessment - Initial Assessment Questions  1  FETAL MOVEMENT: "Has the baby's movement decreased or changed significantly from normal?" (e g , yes, no; describe)      Normally feels movement daily  2  BEATRIZ: "What date are you expecting to deliver?"       10/14/20  27 weeks  3  PREGNANCY: "How many weeks pregnant are you?"      27 weeks  Pt notes she normally eats breakfast and baby is active  Today not feeling any movement yet   Notes she had "eggs and things for breakfast and tea"    Protocols used: PREGNANCY - DECREASED FETAL MOVEMENT-ADULT-

## 2020-07-12 NOTE — TELEPHONE ENCOUNTER
Advised labor and delivery for evaluation by Dr Diandra Haywood    Pt advised and notes previously advised to send to [facility]   Will go there now

## 2020-07-15 ENCOUNTER — ROUTINE PRENATAL (OUTPATIENT)
Dept: OBGYN CLINIC | Facility: CLINIC | Age: 35
End: 2020-07-15

## 2020-07-15 VITALS
TEMPERATURE: 99.8 F | DIASTOLIC BLOOD PRESSURE: 50 MMHG | BODY MASS INDEX: 35.15 KG/M2 | WEIGHT: 180 LBS | SYSTOLIC BLOOD PRESSURE: 108 MMHG

## 2020-07-15 DIAGNOSIS — Z34.83 PRENATAL CARE, SUBSEQUENT PREGNANCY, THIRD TRIMESTER: ICD-10-CM

## 2020-07-15 DIAGNOSIS — Z36.89 ENCOUNTER FOR OTHER SPECIFIED ANTENATAL SCREENING: ICD-10-CM

## 2020-07-15 DIAGNOSIS — Z3A.28 PREGNANCY WITH 28 COMPLETED WEEKS GESTATION: Primary | ICD-10-CM

## 2020-07-15 PROCEDURE — 87070 CULTURE OTHR SPECIMN AEROBIC: CPT | Performed by: OBSTETRICS & GYNECOLOGY

## 2020-07-15 PROCEDURE — 87624 HPV HI-RISK TYP POOLED RSLT: CPT | Performed by: OBSTETRICS & GYNECOLOGY

## 2020-07-15 PROCEDURE — PNV: Performed by: OBSTETRICS & GYNECOLOGY

## 2020-07-15 PROCEDURE — G0145 SCR C/V CYTO,THINLAYER,RESCR: HCPCS | Performed by: OBSTETRICS & GYNECOLOGY

## 2020-07-15 NOTE — PROGRESS NOTES
This is a 28-year-old [place] female who is been back and states for approximately 4 weeks  She had an IVF pregnancy [place] ([facility])  She has a history of infertility  During the workup days found she has factor 5 Leiden deficiency  There also wanted test her antibiotics for in T HF are that is not complete  They started her on Lovenox  She was also informed to be hypothyroid  She has an increased BMI, she is a AMA  She was seen at the [facility] recently  At that time we also following her for fetal pyelectasis

## 2020-07-15 NOTE — PATIENT INSTRUCTIONS
The patient was reminded to do fetal kick counts, she will make arrange to get 20 week lab work in in 1 week  Return my office in 3 weeks  Continue to follow with Maternal-Fetal Medicine  She has a consultation with Hematology next week to see whether not she is homozygous or heterozygous for factor 5 Leiden deficiency  She knows she is at high risk for diabetes and  section  All questions were answered  Pelvic exam was performed  Pap smear was performed  Cultures were taken

## 2020-07-17 LAB
HPV HR 12 DNA CVX QL NAA+PROBE: NEGATIVE
HPV16 DNA CVX QL NAA+PROBE: NEGATIVE
HPV18 DNA CVX QL NAA+PROBE: NEGATIVE

## 2020-07-18 ENCOUNTER — APPOINTMENT (OUTPATIENT)
Dept: LAB | Age: 35
End: 2020-07-18
Payer: COMMERCIAL

## 2020-07-18 DIAGNOSIS — Z3A.28 PREGNANCY WITH 28 COMPLETED WEEKS GESTATION: ICD-10-CM

## 2020-07-18 LAB
BACTERIA GENITAL AEROBE CULT: NORMAL
BASOPHILS # BLD AUTO: 0.02 THOUSANDS/ΜL (ref 0–0.1)
BASOPHILS NFR BLD AUTO: 0 % (ref 0–1)
EOSINOPHIL # BLD AUTO: 0.05 THOUSAND/ΜL (ref 0–0.61)
EOSINOPHIL NFR BLD AUTO: 1 % (ref 0–6)
ERYTHROCYTE [DISTWIDTH] IN BLOOD BY AUTOMATED COUNT: 14.6 % (ref 11.6–15.1)
GLUCOSE 1H P 50 G GLC PO SERPL-MCNC: 202 MG/DL
HCT VFR BLD AUTO: 31.4 % (ref 34.8–46.1)
HGB BLD-MCNC: 9.8 G/DL (ref 11.5–15.4)
IMM GRANULOCYTES # BLD AUTO: 0.1 THOUSAND/UL (ref 0–0.2)
IMM GRANULOCYTES NFR BLD AUTO: 1 % (ref 0–2)
LYMPHOCYTES # BLD AUTO: 1.4 THOUSANDS/ΜL (ref 0.6–4.47)
LYMPHOCYTES NFR BLD AUTO: 19 % (ref 14–44)
MCH RBC QN AUTO: 26.8 PG (ref 26.8–34.3)
MCHC RBC AUTO-ENTMCNC: 31.2 G/DL (ref 31.4–37.4)
MCV RBC AUTO: 86 FL (ref 82–98)
MONOCYTES # BLD AUTO: 0.26 THOUSAND/ΜL (ref 0.17–1.22)
MONOCYTES NFR BLD AUTO: 3 % (ref 4–12)
NEUTROPHILS # BLD AUTO: 5.75 THOUSANDS/ΜL (ref 1.85–7.62)
NEUTS SEG NFR BLD AUTO: 76 % (ref 43–75)
NRBC BLD AUTO-RTO: 0 /100 WBCS
PLATELET # BLD AUTO: 256 THOUSANDS/UL (ref 149–390)
PMV BLD AUTO: 10.7 FL (ref 8.9–12.7)
RBC # BLD AUTO: 3.65 MILLION/UL (ref 3.81–5.12)
WBC # BLD AUTO: 7.58 THOUSAND/UL (ref 4.31–10.16)

## 2020-07-18 PROCEDURE — 86592 SYPHILIS TEST NON-TREP QUAL: CPT

## 2020-07-18 PROCEDURE — 82950 GLUCOSE TEST: CPT

## 2020-07-18 PROCEDURE — 36415 COLL VENOUS BLD VENIPUNCTURE: CPT

## 2020-07-18 PROCEDURE — 85025 COMPLETE CBC W/AUTO DIFF WBC: CPT

## 2020-07-20 ENCOUNTER — TELEPHONE (OUTPATIENT)
Dept: OBGYN CLINIC | Facility: CLINIC | Age: 35
End: 2020-07-20

## 2020-07-20 DIAGNOSIS — O24.419 GESTATIONAL DIABETES MELLITUS (GDM) IN THIRD TRIMESTER, GESTATIONAL DIABETES METHOD OF CONTROL UNSPECIFIED: Primary | ICD-10-CM

## 2020-07-20 LAB
C TRACH DNA SPEC QL NAA+PROBE: ABNORMAL
N GONORRHOEA DNA SPEC QL NAA+PROBE: ABNORMAL
RPR SER QL: NORMAL

## 2020-07-20 NOTE — TELEPHONE ENCOUNTER
Pt informed 1 hr glucose 7/14/2020 (28 wk labs) = 202 c/w gestational diabetes  Informed will have f/u with diabetes educ for glucose monitoring & diet recom    referral placed in pt's chart & message sent to [facility]

## 2020-07-20 NOTE — TELEPHONE ENCOUNTER
----- Message from Caitlyn Ludwig MD sent at 7/19/2020  5:21 AM EDT -----  Please make sure this patient is taking extra iron

## 2020-07-21 DIAGNOSIS — O24.419 GESTATIONAL DIABETES MELLITUS (GDM) IN THIRD TRIMESTER, GESTATIONAL DIABETES METHOD OF CONTROL UNSPECIFIED: Primary | ICD-10-CM

## 2020-07-21 LAB
LAB AP GYN PRIMARY INTERPRETATION: NORMAL
LAB AP LMP: NORMAL
Lab: NORMAL

## 2020-07-21 RX ORDER — LANCETS 33 GAUGE
EACH MISCELLANEOUS
Qty: 100 EACH | Refills: 6 | Status: SHIPPED | OUTPATIENT
Start: 2020-07-21 | End: 2022-05-17 | Stop reason: ALTCHOICE

## 2020-07-21 RX ORDER — BLOOD-GLUCOSE METER
EACH MISCELLANEOUS
Qty: 1 KIT | Refills: 0 | Status: SHIPPED | OUTPATIENT
Start: 2020-07-21 | End: 2022-05-17 | Stop reason: ALTCHOICE

## 2020-07-21 RX ORDER — BLOOD SUGAR DIAGNOSTIC
STRIP MISCELLANEOUS
Qty: 100 EACH | Refills: 6 | Status: SHIPPED | OUTPATIENT
Start: 2020-07-21 | End: 2022-05-17 | Stop reason: ALTCHOICE

## 2020-07-22 ENCOUNTER — APPOINTMENT (OUTPATIENT)
Dept: LAB | Age: 35
End: 2020-07-22
Payer: COMMERCIAL

## 2020-07-22 ENCOUNTER — CONSULT (OUTPATIENT)
Dept: HEMATOLOGY ONCOLOGY | Facility: CLINIC | Age: 35
End: 2020-07-22
Payer: COMMERCIAL

## 2020-07-22 ENCOUNTER — TELEPHONE (OUTPATIENT)
Dept: PERINATAL CARE | Facility: OTHER | Age: 35
End: 2020-07-22

## 2020-07-22 VITALS
TEMPERATURE: 98.4 F | DIASTOLIC BLOOD PRESSURE: 62 MMHG | RESPIRATION RATE: 18 BRPM | OXYGEN SATURATION: 97 % | SYSTOLIC BLOOD PRESSURE: 122 MMHG | HEART RATE: 105 BPM | BODY MASS INDEX: 35.14 KG/M2 | WEIGHT: 179 LBS | HEIGHT: 60 IN

## 2020-07-22 DIAGNOSIS — D64.9 ANEMIA, UNSPECIFIED TYPE: ICD-10-CM

## 2020-07-22 DIAGNOSIS — Z34.90 PREGNANCY, UNSPECIFIED GESTATIONAL AGE: Primary | ICD-10-CM

## 2020-07-22 DIAGNOSIS — D68.51 FACTOR V LEIDEN (HCC): ICD-10-CM

## 2020-07-22 DIAGNOSIS — Z34.82 PRENATAL CARE, SUBSEQUENT PREGNANCY, SECOND TRIMESTER: ICD-10-CM

## 2020-07-22 DIAGNOSIS — Z34.90 PREGNANCY, UNSPECIFIED GESTATIONAL AGE: ICD-10-CM

## 2020-07-22 LAB
ALBUMIN SERPL BCP-MCNC: 2.6 G/DL (ref 3.5–5)
ALP SERPL-CCNC: 294 U/L (ref 46–116)
ALT SERPL W P-5'-P-CCNC: 22 U/L (ref 12–78)
ANION GAP SERPL CALCULATED.3IONS-SCNC: 8 MMOL/L (ref 4–13)
AST SERPL W P-5'-P-CCNC: 14 U/L (ref 5–45)
BASOPHILS # BLD AUTO: 0.03 THOUSANDS/ΜL (ref 0–0.1)
BASOPHILS NFR BLD AUTO: 0 % (ref 0–1)
BILIRUB SERPL-MCNC: 0.2 MG/DL (ref 0.2–1)
BUN SERPL-MCNC: 4 MG/DL (ref 5–25)
CALCIUM SERPL-MCNC: 9 MG/DL (ref 8.3–10.1)
CHLORIDE SERPL-SCNC: 109 MMOL/L (ref 100–108)
CO2 SERPL-SCNC: 21 MMOL/L (ref 21–32)
CREAT SERPL-MCNC: 0.5 MG/DL (ref 0.6–1.3)
EOSINOPHIL # BLD AUTO: 0.06 THOUSAND/ΜL (ref 0–0.61)
EOSINOPHIL NFR BLD AUTO: 1 % (ref 0–6)
ERYTHROCYTE [DISTWIDTH] IN BLOOD BY AUTOMATED COUNT: 14.6 % (ref 11.6–15.1)
FERRITIN SERPL-MCNC: 5 NG/ML (ref 8–388)
GFR SERPL CREATININE-BSD FRML MDRD: 127 ML/MIN/1.73SQ M
GLUCOSE SERPL-MCNC: 89 MG/DL (ref 65–140)
HCT VFR BLD AUTO: 31.1 % (ref 34.8–46.1)
HGB BLD-MCNC: 9.8 G/DL (ref 11.5–15.4)
IMM GRANULOCYTES # BLD AUTO: 0.08 THOUSAND/UL (ref 0–0.2)
IMM GRANULOCYTES NFR BLD AUTO: 1 % (ref 0–2)
IRON SATN MFR SERPL: 8 %
IRON SERPL-MCNC: 40 UG/DL (ref 50–170)
LYMPHOCYTES # BLD AUTO: 1.65 THOUSANDS/ΜL (ref 0.6–4.47)
LYMPHOCYTES NFR BLD AUTO: 21 % (ref 14–44)
MCH RBC QN AUTO: 26.7 PG (ref 26.8–34.3)
MCHC RBC AUTO-ENTMCNC: 31.5 G/DL (ref 31.4–37.4)
MCV RBC AUTO: 85 FL (ref 82–98)
MONOCYTES # BLD AUTO: 0.56 THOUSAND/ΜL (ref 0.17–1.22)
MONOCYTES NFR BLD AUTO: 7 % (ref 4–12)
NEUTROPHILS # BLD AUTO: 5.63 THOUSANDS/ΜL (ref 1.85–7.62)
NEUTS SEG NFR BLD AUTO: 70 % (ref 43–75)
NRBC BLD AUTO-RTO: 0 /100 WBCS
PLATELET # BLD AUTO: 252 THOUSANDS/UL (ref 149–390)
PMV BLD AUTO: 10.6 FL (ref 8.9–12.7)
POTASSIUM SERPL-SCNC: 4 MMOL/L (ref 3.5–5.3)
PROT SERPL-MCNC: 6.7 G/DL (ref 6.4–8.2)
RBC # BLD AUTO: 3.67 MILLION/UL (ref 3.81–5.12)
SODIUM SERPL-SCNC: 138 MMOL/L (ref 136–145)
TIBC SERPL-MCNC: 487 UG/DL (ref 250–450)
VIT B12 SERPL-MCNC: 247 PG/ML (ref 100–900)
WBC # BLD AUTO: 8.01 THOUSAND/UL (ref 4.31–10.16)

## 2020-07-22 PROCEDURE — 82728 ASSAY OF FERRITIN: CPT

## 2020-07-22 PROCEDURE — 82607 VITAMIN B-12: CPT

## 2020-07-22 PROCEDURE — 86147 CARDIOLIPIN ANTIBODY EA IG: CPT

## 2020-07-22 PROCEDURE — 83540 ASSAY OF IRON: CPT

## 2020-07-22 PROCEDURE — 85025 COMPLETE CBC W/AUTO DIFF WBC: CPT

## 2020-07-22 PROCEDURE — 86146 BETA-2 GLYCOPROTEIN ANTIBODY: CPT

## 2020-07-22 PROCEDURE — 83550 IRON BINDING TEST: CPT

## 2020-07-22 PROCEDURE — 36415 COLL VENOUS BLD VENIPUNCTURE: CPT

## 2020-07-22 PROCEDURE — 80053 COMPREHEN METABOLIC PANEL: CPT

## 2020-07-22 PROCEDURE — 99244 OFF/OP CNSLTJ NEW/EST MOD 40: CPT | Performed by: INTERNAL MEDICINE

## 2020-07-22 NOTE — TELEPHONE ENCOUNTER
Attempted to reach pt to confirm virtual appt with diabetes 7/23 @ 1082  Voicemail message left with appt date and time  Advised pt that she should have received an email with instructions and that if she did not, to call [phone]  Also advised pt that if she has trouble logging into the call tomorrow to call our office immediately @ [phone]

## 2020-07-22 NOTE — PROGRESS NOTES
Hematology/Oncology Outpatient consultation  Guillermina Nice 29 y o  female 1985 [id number]    Date:  7/22/2020    Assessment and Plan:  1  Factor V Leiden [facility])  The patient seems to have a significant family history of clotting on her mother side of the family  She also was tested to be heterozygous for factor 5 Leiden  The patient was educated about factor 5 Leiden in general and was told that we usually do not test general population since this is a very common mutation in estimated to be about 5% in Caucasians  However, due to her multiple miscarriages and positive family history for clotting, hypercoagulable workup was appropriately done  It is not clear if she had a full hypercoagulable workup in [place] ([facility])  I think it would be appropriate to add anti cardiolipin antibody and beta 2 glycoprotein to rule out antiphospholipid antibody syndrome  The patient was told that we cannot pursue full anticoagulation panel during pregnancy and while she is using Lovenox  We did discuss the potential benefit of prophylactic dose of low molecular weight heparin during pregnancy which seems to be appropriate due to the significant family history for clotting with the heterozygous factor 5 Leiden mutation and the fact that she was on high-dose of estrogen during the 1st trimester after the IVF procedure  The patient was told that we really do not pay attention to the MTHFR mutation since does not have a clinical value regarding clotting risk  - Cardiolipin antibody; Future  - Beta-2 glycoprotein antibodies; Future    2  Prenatal care, subsequent pregnancy, second trimester  The patient should be continued on prophylactic dose low molecular weight heparin Lovenox 40 mg subcutaneously during pregnancy in also up to 6 weeks post delivery  - Cardiolipin antibody; Future  - Beta-2 glycoprotein antibodies; Future    3  Pregnancy, unspecified gestational age    - Cardiolipin antibody;  Future  - Beta-2 glycoprotein antibodies; Future  - CBC and differential; Future  - Comprehensive metabolic panel; Future  - Vitamin B12; Future  - Iron Panel (Includes Ferritin, Iron Sat%, Iron, and TIBC); Future  - Ferritin; Future    4  Anemia, unspecified type  She seems to have significant anemia with hemoglobin around 9-10 g  She will get the initial workup to see if she is iron deficient or vitamin B12 deficient  We will see her again in about a month from now to finalize our recommendation  HPI:  This is a 58-year-old female originally from [place] has a history of hypothyroidism and history of PCOS  The patient also stated that she had 3 miscarriages within the 1st 10 weeks of pregnancy  She is currently pregnant after IVF which was done in [place]  She stated that she had to take hormones for the 1st trimester to secure the current pregnancy  The patient also stated that she has significant family history of clotting on her mother side including her grandfather, grandmother and uncle  She had hypercoagulable workup in [place] which revealed heterozygosity for factor 5 Leiden and 1 mutation of MTHFR  The patient was started on a prophylactic dose of Lovenox 40 mg once a day subcutaneously after the IVF procedure  She is currently pregnant in her 3rd trimester and due for delivery on 10/09/2020  She continues to be on the prophylactic dose of Lovenox  Her most recent blood work from 07/18/2020 showed hemoglobin of 9 8 with normal MCV  Her white cells and platelets were within normal range  TSH earlier was 2 0  The patient came in today to discuss anticoagulation and risk of clotting during current pregnancy  Interval history:    ROS: Review of Systems   Constitutional: Negative for activity change, appetite change, chills, diaphoresis, fatigue, fever and unexpected weight change     HENT: Negative for congestion, dental problem, ear discharge, ear pain, facial swelling, hearing loss, mouth sores, nosebleeds, postnasal drip, sore throat, tinnitus and trouble swallowing  Eyes: Negative for discharge, redness, itching and visual disturbance  Respiratory: Negative for cough, chest tightness, shortness of breath and wheezing  Cardiovascular: Negative for chest pain, palpitations and leg swelling  Gastrointestinal: Negative for abdominal distention, abdominal pain, anal bleeding, blood in stool, constipation, diarrhea, nausea and vomiting  Genitourinary: Negative for difficulty urinating, dysuria, flank pain, frequency, hematuria and urgency  Musculoskeletal: Positive for arthralgias  Negative for back pain, gait problem, joint swelling, myalgias and neck pain  Skin: Negative for color change, pallor, rash and wound  Neurological: Positive for numbness  Negative for dizziness, syncope, speech difficulty, weakness, light-headedness and headaches  Hematological: Negative for adenopathy  Does not bruise/bleed easily  Psychiatric/Behavioral: Negative for agitation, behavioral problems, confusion and sleep disturbance         Past Medical History:   Diagnosis Date    Disease of thyroid gland     Factor 5 Leiden mutation, heterozygous ([place] [id number] )     History of PCOS     MTHFR mutation ([place] [id number] )     Pre-diabetes        Past Surgical History:   Procedure Laterality Date    REDUCTION MAMMAPLASTY      WISDOM TOOTH EXTRACTION         Social History     Socioeconomic History    Marital status: /Civil Union     Spouse name: None    Number of children: None    Years of education: None    Highest education level: None   Occupational History    None   Social Needs    Financial resource strain: None    Food insecurity:     Worry: None     Inability: None    Transportation needs:     Medical: None     Non-medical: None   Tobacco Use    Smoking status: Never Smoker    Smokeless tobacco: Never Used   Substance and Sexual Activity    Alcohol use: No    Drug use: No    Sexual activity: Yes     Partners: Male   Lifestyle  Physical activity:     Days per week: None     Minutes per session: None    Stress: None   Relationships    Social connections:     Talks on phone: None     Gets together: None     Attends Sikh service: None     Active member of club or organization: None     Attends meetings of clubs or organizations: None     Relationship status: None    Intimate partner violence:     Fear of current or ex partner: None     Emotionally abused: None     Physically abused: None     Forced sexual activity: None   Other Topics Concern    None   Social History Narrative    Uses safety equipment seatbelts       Family History   Problem Relation Age of Onset    Diabetes Mother     Migraines Mother     Polycystic ovary syndrome Mother     Thyroid disease Mother     Thyroid disease Sister     Diabetes Maternal Grandmother     Thyroid disease Maternal Grandmother     Thyroid disease Maternal Aunt     Melanoma Family        No Known Allergies      Current Outpatient Medications:     aspirin (ECOTRIN LOW STRENGTH) 81 mg EC tablet, Take 81 mg by mouth daily, Disp: , Rfl:     Blood Glucose Monitoring Suppl (ONETOUCH VERIO FLEX SYSTEM) w/Device KIT, Test 4 times per day , Disp: 1 kit, Rfl: 0    enoxaparin (LOVENOX) 40 mg/0 4 mL, Inject 0 4 mL under the skin daily at bedtime, Disp: , Rfl:     levothyroxine (Synthroid) 25 mcg tablet, Take 25 mcg by mouth daily , Disp: , Rfl:     OneTouch Delica Lancets 92P MISC, Test 4 times per day , Disp: 100 each, Rfl: 6    ONETOUCH VERIO test strip, Test 4 times per day , Disp: 100 each, Rfl: 6    Prenatal Vit-Fe Fumarate-FA (PRENATAL VITAMINS PLUS PO), Take 1 tablet by mouth daily, Disp: , Rfl:       Physical Exam:  /62 (BP Location: Left arm, Patient Position: Sitting, Cuff Size: Adult)   Pulse 105   Temp 98 4 °F (36 9 °C)   Resp 18   Ht 5' (1 524 m)   Wt 81 2 kg (179 lb)   SpO2 97%   BMI 34 96 kg/m²     Physical Exam   Constitutional: She is oriented to person, place, and time  She appears well-developed and well-nourished  No distress  HENT:   Head: Normocephalic and atraumatic  Nose: Nose normal    Eyes: Pupils are equal, round, and reactive to light  Conjunctivae and EOM are normal  Right eye exhibits no discharge  Left eye exhibits no discharge  No scleral icterus  Neck: Normal range of motion  Neck supple  No JVD present  No tracheal deviation present  No thyromegaly present  Cardiovascular: Normal rate, regular rhythm and normal heart sounds  Exam reveals no friction rub  No murmur heard  Pulmonary/Chest: Effort normal and breath sounds normal  No stridor  No respiratory distress  She has no wheezes  She has no rales  She exhibits no tenderness  Abdominal: Soft  Bowel sounds are normal  She exhibits distension (Due to current pregnancy)  She exhibits no mass  There is no hepatosplenomegaly, splenomegaly or hepatomegaly  There is no tenderness  There is no rebound and no guarding  Musculoskeletal: Normal range of motion  She exhibits no edema, tenderness or deformity  Lymphadenopathy:     She has no cervical adenopathy  Neurological: She is alert and oriented to person, place, and time  She has normal reflexes  No cranial nerve deficit  Coordination normal    Skin: Skin is warm and dry  No rash noted  She is not diaphoretic  No erythema  No pallor  Psychiatric: She has a normal mood and affect   Her behavior is normal  Judgment and thought content normal          Labs:  Lab Results   Component Value Date    WBC 7 58 07/18/2020    HGB 9 8 (L) 07/18/2020    HCT 31 4 (L) 07/18/2020    MCV 86 07/18/2020     07/18/2020     Lab Results   Component Value Date    K 3 8 11/20/2019     (H) 11/20/2019    CO2 26 11/20/2019    BUN 10 11/20/2019    CREATININE 0 78 11/20/2019    GLUF 98 04/10/2018    CALCIUM 9 0 11/20/2019    AST 17 04/10/2018    ALT 35 04/10/2018    ALKPHOS 44 (L) 04/10/2018    EGFR 99 11/20/2019       Patient voiced understanding and agreement in the above discussion  Aware to contact our office with questions/symptoms in the interim

## 2020-07-23 ENCOUNTER — DOCUMENTATION (OUTPATIENT)
Dept: PERINATAL CARE | Facility: CLINIC | Age: 35
End: 2020-07-23

## 2020-07-23 ENCOUNTER — TELEPHONE (OUTPATIENT)
Dept: HEMATOLOGY ONCOLOGY | Facility: CLINIC | Age: 35
End: 2020-07-23

## 2020-07-23 ENCOUNTER — TELEPHONE (OUTPATIENT)
Dept: PERINATAL CARE | Facility: CLINIC | Age: 35
End: 2020-07-23

## 2020-07-23 ENCOUNTER — TELEMEDICINE (OUTPATIENT)
Dept: PERINATAL CARE | Facility: CLINIC | Age: 35
End: 2020-07-23
Payer: COMMERCIAL

## 2020-07-23 DIAGNOSIS — E28.2 POLYCYSTIC OVARIES: ICD-10-CM

## 2020-07-23 DIAGNOSIS — O99.213 OBESITY AFFECTING PREGNANCY, ANTEPARTUM, THIRD TRIMESTER: ICD-10-CM

## 2020-07-23 DIAGNOSIS — O24.419 GESTATIONAL DIABETES MELLITUS (GDM) IN THIRD TRIMESTER, GESTATIONAL DIABETES METHOD OF CONTROL UNSPECIFIED: Primary | ICD-10-CM

## 2020-07-23 DIAGNOSIS — Z3A.28 28 WEEKS GESTATION OF PREGNANCY: ICD-10-CM

## 2020-07-23 PROBLEM — D50.8 IRON DEFICIENCY ANEMIA SECONDARY TO INADEQUATE DIETARY IRON INTAKE: Status: ACTIVE | Noted: 2020-07-23

## 2020-07-23 PROBLEM — D51.8 OTHER VITAMIN B12 DEFICIENCY ANEMIAS: Status: ACTIVE | Noted: 2020-07-23

## 2020-07-23 LAB
CARDIOLIPIN IGA SER IA-ACNC: <9 APL U/ML (ref 0–11)
CARDIOLIPIN IGG SER IA-ACNC: <9 GPL U/ML (ref 0–14)
CARDIOLIPIN IGM SER IA-ACNC: <9 MPL U/ML (ref 0–12)

## 2020-07-23 PROCEDURE — G0108 DIAB MANAGE TRN  PER INDIV: HCPCS

## 2020-07-23 RX ORDER — CYANOCOBALAMIN 1000 UG/ML
1000 INJECTION INTRAMUSCULAR; SUBCUTANEOUS ONCE
Status: CANCELLED | OUTPATIENT
Start: 2020-07-30

## 2020-07-23 RX ORDER — SODIUM CHLORIDE 9 MG/ML
20 INJECTION, SOLUTION INTRAVENOUS ONCE
Status: CANCELLED | OUTPATIENT
Start: 2020-07-30

## 2020-07-23 NOTE — TELEPHONE ENCOUNTER
Phoned pt to inform her that she will need 5 doses of Venofer and Vit B 12 once a wk  Pt prefers morning and can be any day  We will phone her back tomorrow once we know the time of first infusion

## 2020-07-23 NOTE — PROGRESS NOTES
Demographics:  Language: English  Ethnicity: Other middle [place] of Origin: Other [place] ([facility])    Education/Occupation:  Highest grade completed: College Degree  Occupation: Sales  Employer Name:Patient did not provide and would prefer to be contacted by cell phone  Hours worked per week: Part Time three days per week [id number]  Shift worked: Other (9AM-5PM sometimes changes )  May we contact you at work?no    Personal & Family History:  Personal history of diabetes? PCOS, insulin resistance per patient  Family members with diabetes: Mother, Sister and maternal grandmother    Pregnancy History:  How many total pregnancies have you had? 4  How many children do you have? Other 0  Have you had a baby weighing larger than 9 lbs? no  Are you having swelling or fluid retention? no  Have you been placed on bedrest? no    Physical Activity:  Do you exercise?yes  Type of Exercise: Walking  Frequency of Exercise: Daily    Nutrition Questionnaire: How many meals do you eat daily? 3  List times of meals: Breakfast: 10 AM/ Lunch: 3:00 PM/ Dinner: 7 PM  How many snacks do you eat daily? Other varies sometimes has a snack  What type of diet are you following at home? Other regular  Do you have special or ethnic dietary preferences? yes no pork  Do you have any food allergies or intolerances? no  When was your last meal? 8 PM  List what you ate and the amounts:  1 cup bulgar, meatballs, salad and yogurt drink    Learning preferences: How do you learn best? Lecture  How do you rate your health? [place]  Who is your primary support person? Spouse  How do you cope with stress? exercise  Do you have any cultural or Rastafarian beliefs we should be aware of? No pork  How do you feel the diabetes diagnosis will affect the rest of your pregnancy?  Not sure its too new  Do you receive WIC or food stamps? no

## 2020-07-23 NOTE — PROGRESS NOTES
Virtual Regular Visit      Assessment/Plan:    Problem List Items Addressed This Visit     None      Visit Diagnoses     Gestational diabetes mellitus (GDM) in third trimester, gestational diabetes method of control unspecified                   Reason for visit is   Chief Complaint   Patient presents with    Gestational Diabetes    Patient Education    Virtual Regular Visit        Encounter provider Nheal Haro    Provider located at [address] [place] [id number] [phone]      Recent Visits  No visits were found meeting these conditions  Showing recent visits within past 7 days and meeting all other requirements     Today's Visits  Date Type Provider Dept   07/23/20 1401 [address]   Showing today's visits and meeting all other requirements     Future Appointments  No visits were found meeting these conditions  Showing future appointments within next 150 days and meeting all other requirements        The patient was identified by name and date of birth  Aide Lewis was informed that this is a telemedicine visit and that the visit is being conducted through [employer]  My office door was closed  No one else was in the room  She acknowledged consent and understanding of privacy and security of the video platform  The patient has agreed to participate and understands they can discontinue the visit at any time  Patient is aware this is a billable service  Subjective  Aide Lewis is a 29 y o  pregnant female         HPI     Past Medical History:   Diagnosis Date    Disease of thyroid gland     Factor [facility] Leiden mutation[facility] heterozygous [facility] )     History of PCOS     MTHFR mutation ([facility] )     Pre-diabetes        Past Surgical History:   Procedure Laterality Date    REDUCTION MAMMAPLASTY      WISDOM TOOTH EXTRACTION         Current Outpatient Medications   Medication Sig Dispense Refill    aspirin (ECOTRIN LOW STRENGTH) 81 mg EC tablet Take 81 mg by mouth daily      Blood Glucose Monitoring Suppl (ONETOUCH VERIO FLEX SYSTEM) w/Device KIT Test 4 times per day  1 kit 0    enoxaparin (LOVENOX) 40 mg/0 4 mL Inject 0 4 mL under the skin daily at bedtime      levothyroxine (Synthroid) 25 mcg tablet Take 25 mcg by mouth daily       OneTouch Delica Lancets 92M MISC Test 4 times per day  100 each 6    ONETOUCH VERIO test strip Test 4 times per day  100 each 6    Prenatal Vit-Fe Fumarate-FA (PRENATAL VITAMINS PLUS PO) Take 1 tablet by mouth daily       No current facility-administered medications for this visit  No Known Allergies    Review of Systems    Video Exam    There were no vitals filed for this visit  Physical Exam     Time spent with patient 8:30-9:32 AM       VIRTUAL VISIT DISCLAIMER    Rodolfo Nesbitt acknowledges that she has consented to an online visit or consultation  She understands that the online visit is based solely on information provided by her, and that, in the absence of a face-to-face physical evaluation by the physician, the diagnosis she receives is both limited and provisional in terms of accuracy and completeness  This is not intended to replace a full medical face-to-face evaluation by the physician  Rodolfo Nesbitt understands and accepts these terms  20  Krysta Adams Amyumbjina   1985  Estimated Date of Delivery: 10/14/20   EGA: 28w1d  Referring Provider: Dr Miya Leggett    Thank you for referring your patient to the Diabetes and Pregnancy Program at [address] [place] [address]  The patient attended class 1 virtual telemedicine visit and patient received the following education:     Pathophysiology of diabetes and pregnancy    This includes maternal-fetal complications such as fetal macrosomia,  hypoglycemia, polyhydramnios, increased incidence of  section, pre-term labor and in severe cases, fetal demise and stillbirth   Instruction on diet and glucometer use was provided  Self-monitoring of blood glucose levels: fasting (goal 60mg/dl to 90mg/dl) and two hours after the start of the meal less (goal less than 120mg/dl)  The patient was provided with a OneTouch blood glucose meter and supplies  Blood glucose during demonstration was 110 mg/dl FBG approximately 12 hr fast     Medical Nutrition Therapy for diabetes and pregnancy  The patient was provided with a 1800 calorie meal plan and the following was reviewed:     o Basic review of macronutrients   o Meal pattern should consist of three small meals and three snacks daily  o Carbohydrate gram amounts per meal   o Instructions on how to read a food label  o Appropriate serving sizes for carbohydrates and proteins  o Incorporating protein at each meal and snack  o Maintain a three day food diary and bring to class 2 virtual visit   Report blood glucose levels to the [address] [place] [address] Way weekly or as directed:  o Phone : [phone]  If no response in 24 hours, call [phone]   o Fax: [phone]  o [facility]  The patient is scheduled to attend class 2 virtual visit on 7/30/20  Additionally, fetal ultrasound evaluation by the Perinatologist has been scheduled to assure continuity of care  Patient Stated Goal: "I will eat 3 meals and 3 snacks each day, including protein at each"  Diabetes Self Management Support Plan outside of ongoing care: Spouse/Family    Please contact the Diabetes and Pregnancy Program at [phone] if you have any questions  Time spent with patient 8:30-9:32 AM; time spent face to face counseling greater than 50% of the appointment      Nehal Haro RD,LDN,CDE  Diabetes Educator   Diabetes and Pregnancy Program

## 2020-07-24 ENCOUNTER — TELEPHONE (OUTPATIENT)
Dept: PERINATAL CARE | Facility: CLINIC | Age: 35
End: 2020-07-24

## 2020-07-24 ENCOUNTER — TELEPHONE (OUTPATIENT)
Dept: HEMATOLOGY ONCOLOGY | Facility: CLINIC | Age: 35
End: 2020-07-24

## 2020-07-24 LAB
B2 GLYCOPROT1 IGA SER-ACNC: <9 GPI IGA UNITS (ref 0–25)
B2 GLYCOPROT1 IGG SER-ACNC: <9 GPI IGG UNITS (ref 0–20)
B2 GLYCOPROT1 IGM SER-ACNC: <9 GPI IGM UNITS (ref 0–32)

## 2020-07-24 NOTE — TELEPHONE ENCOUNTER
Patient was scheduled for Iron and B12  Called pt and left message her apt has been scheduled for Thursday 7/30/2020 at 10:30 a m , [address], [place], [facility]  Sindy Conner she will receive more apts when she goes in on 7/30  Stated if she had any questions to please call the office [phone]

## 2020-07-24 NOTE — TELEPHONE ENCOUNTER
Spoke with patient and confirmed appointment with MFM  1 support person ( must be over age of 15) may accompany patient  Will you and your support person be able to wear a mask ,without a valve , during entire appointment? YES    To minimize your exposure in our waiting area,check in and rooming questions will be done via phone  When you arrive in the parking lot please call the following inside line # prior to entering office:      [place] line: [phone]    Have you or your support person traveled outside the state in the last 2 weeks? NO   If yes, what state did you travel to? NO     Do you or your support person have:  Fever or flu- like symptoms? NO   Symptoms of upper respiratory infection like runny nose, sore throat or cough? NO   Do you have new headache that you have not had in the past? NO   Have you experienced any new shortness of breath recently? NO   Do you have any new loss of taste or smell? NO   Do you have any new diarrhea, nausea or vomiting? NO   Have you recently been in contact with anyone who has been sick or diagnosed with COVID-19 infection? NO   Have you been recommended to quarantine because of an exposure to a confirmed positive COVID19 person? NO   You and your support person will have temperature screening upon arrival   Patient verbalized understanding of all instructions

## 2020-07-27 ENCOUNTER — ULTRASOUND (OUTPATIENT)
Dept: PERINATAL CARE | Facility: CLINIC | Age: 35
End: 2020-07-27
Payer: COMMERCIAL

## 2020-07-27 ENCOUNTER — TELEPHONE (OUTPATIENT)
Dept: OBGYN CLINIC | Facility: CLINIC | Age: 35
End: 2020-07-27

## 2020-07-27 VITALS
HEIGHT: 60 IN | TEMPERATURE: 99.2 F | DIASTOLIC BLOOD PRESSURE: 73 MMHG | BODY MASS INDEX: 35.06 KG/M2 | HEART RATE: 102 BPM | SYSTOLIC BLOOD PRESSURE: 109 MMHG | WEIGHT: 178.6 LBS

## 2020-07-27 DIAGNOSIS — O35.0XX0: ICD-10-CM

## 2020-07-27 DIAGNOSIS — O09.523 MULTIGRAVIDA OF ADVANCED MATERNAL AGE IN THIRD TRIMESTER: ICD-10-CM

## 2020-07-27 DIAGNOSIS — Z3A.28 28 WEEKS GESTATION OF PREGNANCY: ICD-10-CM

## 2020-07-27 DIAGNOSIS — O09.813 PREGNANCY RESULTING FROM IN VITRO FERTILIZATION IN THIRD TRIMESTER: ICD-10-CM

## 2020-07-27 DIAGNOSIS — D51.8 OTHER VITAMIN B12 DEFICIENCY ANEMIAS: ICD-10-CM

## 2020-07-27 DIAGNOSIS — O09.893 ENCOUNTER FOR SUPERVISION OF HIGH RISK PREGNANCY DUE TO FETAL ANOMALY, THIRD TRIMESTER: Primary | ICD-10-CM

## 2020-07-27 DIAGNOSIS — D50.8 IRON DEFICIENCY ANEMIA SECONDARY TO INADEQUATE DIETARY IRON INTAKE: ICD-10-CM

## 2020-07-27 DIAGNOSIS — O24.410 DIET CONTROLLED GESTATIONAL DIABETES MELLITUS (GDM) IN THIRD TRIMESTER: ICD-10-CM

## 2020-07-27 PROCEDURE — 99213 OFFICE O/P EST LOW 20 MIN: CPT | Performed by: OBSTETRICS & GYNECOLOGY

## 2020-07-27 PROCEDURE — 76825 ECHO EXAM OF FETAL HEART: CPT | Performed by: OBSTETRICS & GYNECOLOGY

## 2020-07-27 PROCEDURE — 76827 ECHO EXAM OF FETAL HEART: CPT | Performed by: OBSTETRICS & GYNECOLOGY

## 2020-07-27 PROCEDURE — 76816 OB US FOLLOW-UP PER FETUS: CPT | Performed by: OBSTETRICS & GYNECOLOGY

## 2020-07-27 PROCEDURE — 93325 DOPPLER ECHO COLOR FLOW MAPG: CPT | Performed by: OBSTETRICS & GYNECOLOGY

## 2020-07-27 NOTE — PROGRESS NOTES
Maura Yepez has no complaints today  She reports regular fetal movements and denies problems related to hypertension, gestational diabetes,  labor, or vaginal bleeding  Her prenatal course has apparently been unremarkable in the interim since her most recent visit here  She returned today for a fetal growth scan at 28 weeks, fetal echo and review of the missed anatomy  Problem list:  1  IVF pregnancy-conceived in [place] ([facility])  2  Advanced maternal age declined genetic screening  3  Factor 5 Leiden heterozygote without history of thrombosis currently on Lovenox prophylaxis 40 milligrams daily  4  Newly found GDM based on a Glucola of 202  5  Iron deficiency anemia and following with Hematology  6  Subclinical hypothyroidism with a TSH of 2 02 on 20 on  micro grams of levothyroxine  7  At 26 weeks isolated left-sided fetal pyelectasis of 6 millimeters was noted  Ultrasound findings: The ultrasound today shows normal interval fetal growth and fluid  The left renal pyelectasis is measuring at 5-6 millimeters which is stable  The cranial anatomy shows mild bilateral ventriculomegaly measuring just over 1 centimeter for both sides  The cavum and right anterior horns appear prominent  The 3rd ventricle appears prominent  The fetal echo appears normal except for velocities across the aortic arch and aortic valve which were measuring 100 centimeters/sec  Review of the aortic arch by both color and gray scale did not show any evidence of a obvious coarctation  The bilateral choroids, lungs,  nose lips, and diaphragm which were limited on her last ultrasound are seen as normal today  The missed anatomy of the RVOT, ductal and aortic arch, inferior and superior vena cava and three-vessel tracheal view were seen as normal as part of her fetal echo today    Views of the right forearm, hand, right foot and placental cord insertion not seen well at her 26 week scan were still limited by fetal position today  Pregnancy ultrasound has limitations and is unable to detect all forms of fetal congenital abnormalities  The inaccuracy in the EFW can be off by 1 lb either way in the third trimester  Follow up:  Recommend a follow-up ultrasound for fetal growth and review of the missed anatomy of the right forearm and hand, right foot and placental cord insertion  At that time will also review the left renal pelvis pyelectasis, the bilateral mild ventriculomegaly and the increased aortic velocities across the aortic valve and aortic arch to see if there is any other signs of a coarct  Due to the mild ventriculomegaly I offered testing to test for a possible maternal infection with CMV or toxoplasmosis during pregnancy and genetic screening with cell free DNA screening or amniocentesis  At this time she declines any genetic screening  She thought her CMV and toxo testing was completed earlier in pregnancy  I do not see results from [place] in her records  I ordered it and she completed it and her CMV showed a prior infection outside of pregnancy and no history of a toxoplasmosis infection in the past        The majority of time (greater then 50%) was spent on counseling and coordination of care of this patient and /or family member  Approximate face to face time was 15 minutes              Cassie Andrade MD

## 2020-07-27 NOTE — LETTER
2020     Mono Izquierdo MD  [address]  [address] [place] [address]    Patient: Dusty Armstrong   YOB: 1985   Date of Visit: 2020       Dear Dr Lane Leyva: Thank you for referring Dusty Armstrong to me for evaluation  Below are my notes for this consultation  If you have questions, please do not hesitate to call me  I look forward to following your patient along with you  Sincerely,        Chelsea Hidalgo MD        CC: No Recipients  Chelsea Hidalgo MD  2020  2:58 PM  Sign at close encounter  Dusty Armstrong has no complaints today  She reports regular fetal movements and denies problems related to hypertension, gestational diabetes,  labor, or vaginal bleeding  Her prenatal course has apparently been unremarkable in the interim since her most recent visit here  She returned today for a fetal growth scan at 28 weeks, fetal echo and review of the missed anatomy  Problem list:  1  IVF pregnancy-conceived in [place] ([facility])  2  Advanced maternal age declined genetic screening  3  Factor 5 Leiden heterozygote without history of thrombosis currently on Lovenox prophylaxis 40 milligrams daily  4  Newly found GDM based on a Glucola of 202  5  Iron deficiency anemia and following with Hematology  6  Subclinical hypothyroidism with a TSH of 2 02 on 20 on 20/5 micro grams of levothyroxine  7  At 26 weeks isolated left-sided fetal pyelectasis of 6 millimeters was noted  Ultrasound findings: The ultrasound today shows normal interval fetal growth and fluid  The left renal pyelectasis is measuring at 5-6 millimeters which is stable  The cranial anatomy shows mild bilateral ventriculomegaly measuring just over 1 centimeter for both sides  The cavum and right anterior horns appear prominent  The 3rd ventricle appears prominent    The fetal echo appears normal except for velocities across the aortic arch and aortic valve which were measuring 100 centimeters/sec  Review of the aortic arch by both color and gray scale did not show any evidence of a obvious coarctation  The bilateral choroids, lungs,  nose lips, and diaphragm which were limited on her last ultrasound are seen as normal today  The missed anatomy of the RVOT, ductal and aortic arch, inferior and superior vena cava and three-vessel tracheal view were seen as normal as part of her fetal echo today  Views of the right forearm, hand, right foot and placental cord insertion not seen well at her 26 week scan were still limited by fetal position today  Pregnancy ultrasound has limitations and is unable to detect all forms of fetal congenital abnormalities  The inaccuracy in the EFW can be off by 1 lb either way in the third trimester  Follow up:  Recommend a follow-up ultrasound for fetal growth and review of the missed anatomy of the right forearm and hand, right foot and placental cord insertion  At that time will also review the left renal pelvis pyelectasis, the bilateral mild ventriculomegaly and the increased aortic velocities across the aortic valve and aortic arch to see if there is any other signs of a coarct  Due to the mild ventriculomegaly I offered testing to test for a possible maternal infection with CMV or toxoplasmosis during pregnancy and genetic screening with cell free DNA screening or amniocentesis  At this time she declines any genetic screening  She thought her CMV and toxo testing was completed earlier in pregnancy  I do not see results from [place] in her records  I ordered it and she completed it and her CMV showed a prior infection outside of pregnancy and no history of a toxoplasmosis infection in the past        The majority of time (greater then 50%) was spent on counseling and coordination of care of this patient and /or family member  Approximate face to face time was 15 minutes              Maribeth Jones MD

## 2020-07-27 NOTE — LETTER
2020     Kristen Arellano MD  [address]  [address]  [address] [place] [address]    Patient: Mel Prado   YOB: 1985   Date of Visit: 2020       Dear Dr Dorena Osgood: Thank you for referring Mel Prado to me for evaluation  Below are my notes for this consultation  If you have questions, please do not hesitate to call me  I look forward to following your patient along with you  Sincerely,        Rosalee Patel MD        CC: No Recipients  Rosalee Patel MD  2020  6:48 PM  Sign at close encounter  Mel Prado has no complaints today  She reports regular fetal movements and denies problems related to hypertension, gestational diabetes,  labor, or vaginal bleeding  Her prenatal course has apparently been unremarkable in the interim since her most recent visit here  She returned today for a fetal growth scan at 28 weeks, fetal echo and review of the missed anatomy  Problem list:  1  IVF pregnancy-conceived in [place] ([facility])  2  Advanced maternal age declined genetic screening  3  Factor 5 Leiden heterozygote without history of thrombosis currently on Lovenox prophylaxis 40 milligrams daily  4  Newly found GDM based on a Glucola of 202  5  Iron deficiency anemia and following with Hematology  6  Subclinical hypothyroidism with a TSH of 2 02 on 20 on 20/5 micro grams of levothyroxine  7  At 26 weeks isolated left-sided fetal pyelectasis of 6 millimeters was noted  Ultrasound findings: The ultrasound today shows normal interval fetal growth and fluid  The left renal pyelectasis is measuring at 5-6 millimeters which is stable  The cranial anatomy shows mild bilateral ventriculomegaly measuring just over 1 centimeter for both sides  The cavum and right anterior horns appear prominent  The 3rd ventricle appears prominent    The fetal echo appears normal except for velocities across the aortic arch and aortic valve which were measuring 100 centimeters/sec  Review of the aortic arch by both color and gray scale did not show any evidence of a obvious coarctation  The bilateral choroids, lungs,  nose lips, and diaphragm which were limited on her last ultrasound are seen as normal today  The missed anatomy of the RVOT, ductal and aortic arch, inferior and superior vena cava and three-vessel tracheal view were seen as normal as part of her fetal echo today  Views of the right forearm, hand, right foot and placental cord insertion not seen well at her 26 week scan were still limited by fetal position today  Pregnancy ultrasound has limitations and is unable to detect all forms of fetal congenital abnormalities  The inaccuracy in the EFW can be off by 1 lb either way in the third trimester  Follow up:  Recommend a follow-up ultrasound for fetal growth and review of the missed anatomy of the right forearm and hand, right foot and placental cord insertion  At that time will also review the left renal pelvis pyelectasis, the bilateral mild ventriculomegaly and the increased aortic velocities across the aortic valve and aortic arch to see if there is any other signs of a coarct  Due to the mild ventriculomegaly I offered testing to test for a possible maternal infection with CMV or toxoplasmosis during pregnancy and genetic screening with cell free DNA screening or amniocentesis  At this time she declines any genetic screening  She thought her CMV and toxo testing was completed earlier in pregnancy  I do not see results from [place] in her records  Will order it for her to complete in our lab  The majority of time (greater then 50%) was spent on counseling and coordination of care of this patient and /or family member  Approximate face to face time was 15 minutes            Glenna Meza MD

## 2020-07-27 NOTE — TELEPHONE ENCOUNTER
OB - 28 5/7 wk - c/o itching on arms & feet since last night, no rash or bites, does not think skin is dry, no change with soap or detergent  Will try lotion, Benadryl 25 mg   Recall if unimproved

## 2020-07-28 ENCOUNTER — LAB (OUTPATIENT)
Dept: LAB | Age: 35
End: 2020-07-28
Payer: COMMERCIAL

## 2020-07-28 ENCOUNTER — TELEPHONE (OUTPATIENT)
Dept: PERINATAL CARE | Facility: CLINIC | Age: 35
End: 2020-07-28

## 2020-07-28 DIAGNOSIS — O09.893 ENCOUNTER FOR SUPERVISION OF HIGH RISK PREGNANCY DUE TO FETAL ANOMALY, THIRD TRIMESTER: ICD-10-CM

## 2020-07-28 DIAGNOSIS — Z3A.28 28 WEEKS GESTATION OF PREGNANCY: ICD-10-CM

## 2020-07-28 PROCEDURE — 86777 TOXOPLASMA ANTIBODY: CPT

## 2020-07-28 PROCEDURE — 86778 TOXOPLASMA ANTIBODY IGM: CPT

## 2020-07-28 PROCEDURE — 86644 CMV ANTIBODY: CPT

## 2020-07-28 PROCEDURE — 86645 CMV ANTIBODY IGM: CPT

## 2020-07-28 PROCEDURE — 36415 COLL VENOUS BLD VENIPUNCTURE: CPT

## 2020-07-28 NOTE — TELEPHONE ENCOUNTER
Called pt to schedule follow up appt, LEVEL 1 US in 4 weeks, as per AVS     Left voicemail for pt request she call back to schedule

## 2020-07-29 ENCOUNTER — TELEPHONE (OUTPATIENT)
Dept: HEMATOLOGY ONCOLOGY | Facility: CLINIC | Age: 35
End: 2020-07-29

## 2020-07-29 NOTE — TELEPHONE ENCOUNTER
Patient called to confirm their appointment  Appointment confirmed for [facility]             Appointment date and time: 07/30 at 10:30am               [facility] location:   [address] [place] [address]              Patient verbalized understanding of above

## 2020-07-30 ENCOUNTER — HOSPITAL ENCOUNTER (OUTPATIENT)
Dept: INFUSION CENTER | Facility: HOSPITAL | Age: 35
Discharge: HOME | End: 2020-07-30
Attending: INTERNAL MEDICINE
Payer: COMMERCIAL

## 2020-07-30 ENCOUNTER — TELEMEDICINE (OUTPATIENT)
Dept: PERINATAL CARE | Facility: CLINIC | Age: 35
End: 2020-07-30
Payer: COMMERCIAL

## 2020-07-30 ENCOUNTER — DOCUMENTATION (OUTPATIENT)
Dept: PERINATAL CARE | Facility: CLINIC | Age: 35
End: 2020-07-30

## 2020-07-30 ENCOUNTER — TELEPHONE (OUTPATIENT)
Dept: HEMATOLOGY ONCOLOGY | Facility: CLINIC | Age: 35
End: 2020-07-30

## 2020-07-30 VITALS
HEART RATE: 105 BPM | TEMPERATURE: 97.9 F | SYSTOLIC BLOOD PRESSURE: 116 MMHG | RESPIRATION RATE: 18 BRPM | DIASTOLIC BLOOD PRESSURE: 58 MMHG

## 2020-07-30 DIAGNOSIS — O99.213 OBESITY AFFECTING PREGNANCY IN THIRD TRIMESTER: ICD-10-CM

## 2020-07-30 DIAGNOSIS — D51.8 OTHER VITAMIN B12 DEFICIENCY ANEMIAS: ICD-10-CM

## 2020-07-30 DIAGNOSIS — O09.523 MULTIGRAVIDA OF ADVANCED MATERNAL AGE IN THIRD TRIMESTER: ICD-10-CM

## 2020-07-30 DIAGNOSIS — Z87.42 HISTORY OF POLYCYSTIC OVARIAN SYNDROME: ICD-10-CM

## 2020-07-30 DIAGNOSIS — O24.419 GESTATIONAL DIABETES MELLITUS (GDM) IN THIRD TRIMESTER, GESTATIONAL DIABETES METHOD OF CONTROL UNSPECIFIED: Primary | ICD-10-CM

## 2020-07-30 DIAGNOSIS — D50.8 IRON DEFICIENCY ANEMIA SECONDARY TO INADEQUATE DIETARY IRON INTAKE: Primary | ICD-10-CM

## 2020-07-30 DIAGNOSIS — O09.813 PREGNANCY RESULTING FROM IN VITRO FERTILIZATION IN THIRD TRIMESTER: ICD-10-CM

## 2020-07-30 DIAGNOSIS — Z3A.28 28 WEEKS GESTATION OF PREGNANCY: ICD-10-CM

## 2020-07-30 DIAGNOSIS — Z3A.29 29 WEEKS GESTATION OF PREGNANCY: ICD-10-CM

## 2020-07-30 LAB
CLINICAL COMMENT: NORMAL
CMV IGG SERPL IA-ACNC: 4.3 U/ML (ref 0–0.59)
CMV IGM SERPL IA-ACNC: <30 AU/ML (ref 0–29.9)
T GONDII IGG SERPL IA-ACNC: <3 IU/ML (ref 0–7.1)
T GONDII IGM SER IA-ACNC: <3 AU/ML (ref 0–7.9)

## 2020-07-30 PROCEDURE — 96365 THER/PROPH/DIAG IV INF INIT: CPT

## 2020-07-30 PROCEDURE — G0108 DIAB MANAGE TRN  PER INDIV: HCPCS

## 2020-07-30 PROCEDURE — 96372 THER/PROPH/DIAG INJ SC/IM: CPT

## 2020-07-30 RX ORDER — SODIUM CHLORIDE 9 MG/ML
20 INJECTION, SOLUTION INTRAVENOUS ONCE
Status: CANCELLED | OUTPATIENT
Start: 2020-08-06

## 2020-07-30 RX ORDER — SODIUM CHLORIDE 9 MG/ML
20 INJECTION, SOLUTION INTRAVENOUS ONCE
Status: COMPLETED | OUTPATIENT
Start: 2020-07-30 | End: 2020-07-30

## 2020-07-30 RX ORDER — CYANOCOBALAMIN 1000 UG/ML
1000 INJECTION INTRAMUSCULAR; SUBCUTANEOUS ONCE
Status: COMPLETED | OUTPATIENT
Start: 2020-07-30 | End: 2020-07-30

## 2020-07-30 RX ORDER — CYANOCOBALAMIN 1000 UG/ML
1000 INJECTION INTRAMUSCULAR; SUBCUTANEOUS ONCE
Status: CANCELLED | OUTPATIENT
Start: 2020-08-06

## 2020-07-30 RX ADMIN — SODIUM CHLORIDE 20 ML/HR: 0.9 INJECTION, SOLUTION INTRAVENOUS at 10:40

## 2020-07-30 RX ADMIN — CYANOCOBALAMIN 1000 MCG: 1000 INJECTION INTRAMUSCULAR; SUBCUTANEOUS at 10:45

## 2020-07-30 RX ADMIN — IRON SUCROSE 200 MG: 20 INJECTION, SOLUTION INTRAVENOUS at 11:04

## 2020-07-30 NOTE — TELEPHONE ENCOUNTER
Patient has f/u apt on Wed 8/26 with Dr Uzma Chew at [facility]  Called pt to reschedule as Dr Uzma Chew will no longer be in the office on Wednesdays at [facility]  Left message that apt has been rescheduled for Friday 8/28 at 11:40 a m  W/Dr Uzma Chew at [facility]  Asked pt to please call the office if apt doesn't work, [phone]      *Mailing out form with updated apt information*

## 2020-07-30 NOTE — PROGRESS NOTES
Date:  20  RE: Yajaira Dobbins    : 1985  Estimated Date of Delivery: 10/14/20  EGA: 29w1d  OB/GYN: Gianna Monroy        My glucose flow sheet     mg/dl reported during class 2 virtual visit  Current regimen:  1800 calorie gestational diabetes meal plan; 3 meals and 3 snacks daily  SMBG 4 times per day; fasting and 2 hrs pp with a OneTouch Verio blood glucose meter  Food diary revealed patient is often missing snacks, especially bedtime snack due to lack of appetite  FBG measurement is sometimes >10 hr fasting time frame  Plan:  SMBG 4 x per day (Fasting, 2 hour after start of each meal) using OneTouch Verio Flex glucose meter  Meals and snack ideas as well as timing of meals/snacks was reviewed  Stressed the importance of pairing protein with carbohydrate  Continue meal plan and change bedtime snack of 1 serving CHO (15 gms) and 2-3 ounces of protein  Patient is agreeable to eat lunch and dinner earlier to allow for bedtime snack  FBG measurement should follow no longer than 8-10 hrs  Walk 20-30 minutes after dinner if there are no exercise restrictions from OB  Insulin education provided at class 2 education today  Patient is agreeable to starting insulin at next bg report if needed  Patient would like to avoid Metformin and prefers insulin therapy  Provided with HbA1c prescription; noted  CMP and elevated Alk Phos  Noted  US report; next [address] is         Diabetes Self Management Support Plan outside of ongoing care: Spouse/Family    Date due to report next:  Monday, 8/3      Latanya Deleon RD,LDN,CDE  Diabetes Educator   Diabetes and Pregnancy Program

## 2020-07-30 NOTE — PROGRESS NOTES
Virtual Regular Visit      Assessment/Plan:    Problem List Items Addressed This Visit     None               Reason for visit is   Chief Complaint   Patient presents with    Gestational Diabetes    Patient Education    Virtual Regular Visit        Encounter provider Jackson Chavez    Provider located at [address] [place] [address]  [address] [facility] [address] [phone]      Recent Visits  Date Type Provider Dept   07/28/20 Telephone Marie Park, [address]    07/24/20 Telephone Jovita Baker [address]    07/23/20 Telephone [address]   07/23/20 [address]   Showing recent visits within past 7 days and meeting all other requirements     Today's Visits  Date Type Provider Dept   07/30/20 [address]   Showing today's visits and meeting all other requirements     Future Appointments  No visits were found meeting these conditions  Showing future appointments within next 150 days and meeting all other requirements        The patient was identified by name and date of birth  Oli Webster was informed that this is a telemedicine visit and that the visit is being conducted through [employer]  My office door was closed  No one else was in the room  She acknowledged consent and understanding of privacy and security of the video platform  The patient has agreed to participate and understands they can discontinue the visit at any time  Patient is aware this is a billable service       Subjective  Oli Webster is a 29 y o  pregnant female       HPI     Past Medical History:   Diagnosis Date    Disease of thyroid gland     Factor 5 Leiden mutation, heterozygous ([facility] )     History of PCOS     MTHFR mutation ([facility] )     Pre-diabetes        Past Surgical History:   Procedure Laterality Date    REDUCTION MAMMAPLASTY  WISDOM TOOTH EXTRACTION         Current Outpatient Medications   Medication Sig Dispense Refill    aspirin (ECOTRIN LOW STRENGTH) 81 mg EC tablet Take 81 mg by mouth daily      Blood Glucose Monitoring Suppl (ONETOUCH VERIO FLEX SYSTEM) w/Device KIT Test 4 times per day  1 kit 0    enoxaparin (LOVENOX) 40 mg/0 4 mL Inject 0 4 mL under the skin daily at bedtime      levothyroxine (Synthroid) 25 mcg tablet Take 25 mcg by mouth daily       OneTouch Delica Lancets 54R MISC Test 4 times per day  100 each 6    ONETOUCH VERIO test strip Test 4 times per day  100 each 6    Prenatal Vit-Fe Fumarate-FA (PRENATAL VITAMINS PLUS PO) Take 1 tablet by mouth daily       No current facility-administered medications for this visit  No Known Allergies    Review of Systems    Video Exam    There were no vitals filed for this visit  Physical Exam     Time spent with patient 8:30-9:30 AM       VIRTUAL VISIT DISCLAIMER    Jhonnyemilia Ameena acknowledges that she has consented to an online visit or consultation  She understands that the online visit is based solely on information provided by her, and that, in the absence of a face-to-face physical evaluation by the physician, the diagnosis she receives is both limited and provisional in terms of accuracy and completeness  This is not intended to replace a full medical face-to-face evaluation by the physician  Patito Lindquist understands and accepts these terms  DATE:  20  RE: Diana Reyesniccijina    : 1985    BEATRIZ: Estimated Date of Delivery: 10/14/20    EGA: 29w1d  Referring Provider: Dr Shadia Cole    Thank you for referring your patient to the Diabetes and Pregnancy Program at [address] [place] [address]  The patient attended Class 2 virtual visit and received the following education:    Weight gain during in pregnancy   Based on the patients height of 60 inches, pre-pregnancy weight of 165 pounds 32 2 BMI, we would recommend a total weight gain of 11-20 pounds for the pregnancy   The patients current weight is 178 pounds, and her weight gain to date is 13 pounds  Based on this, we recommend a weight gain of no more than 7  pounds for the remainder of the pregnancy   Medical Nutrition Therapy for diabetes and pregnancy  The patients three day food diary was reviewed and discussed  The patient was instructed on the following:  o Individualized meal plan    o Use of food diary to maintain a meal plan    o Importance of protein as it relates to blood glucose control   Review of blood glucose log  Reinforcement of blood glucose goals and reporting guidelines   Ultrasounds every four weeks in the [facility] [address] to evaluate fetal growth   Exercise Guidelines:   o Walking up to thirty minutes daily can reduce blood glucose levels  o Monitor for greater than four contractions per hour     o The patient has been instructed not to begin physical activity if she has been instructed not to exercise by your office   Sick day guidelines and hypoglycemia with treatment   Post-partum guidelines:  o Completion of a 75 gram glucose tolerance test at 6 weeks post-partum to check for type 2 diabetes  o 20% weight loss and 30 minutes of exercise 5 times per week reduces the risk of type 2 diabetes   Breastfeeding guidelines   Report blood glucose levels to [facility] [address] weekly or as directed  o Phone: [phone]  If no response in 24 hours, call [phone]    o Fax: [phone]  o Campos      Your patient was seen in the office today for insulin teaching  Please refer to Diabetes and Pregnancy Progress Record for complete documentation of patients blood glucose levels  The patient was instructed on the following:     Lantus Pen use  Insulin was not initiated at today's visit  Patient is agreeable to start if needed based on next blood glucose report   Patient would prefer to try some diet changes, walking after dinner if there are no restrictions from her doctor and fasting blood glucose measurement should follow no longer than an 8-10 hr fast  She was often not eating a bedtime snack and fasting blood glucose followed >10 hr fast  Patient would like to avoid Metformin due to it crossing the placenta to baby   Insulin administration times, insulin action   Hypoglycemia signs, symptoms and treatment   Increase in maternal-fetal surveillance with insulin initiation   Side effects of hyperglycemia in pregnancy including macrosomia,  hypoglycemia, polyhydramnios, pre-term labor and stillbirth   Continue to monitor blood glucoses via fingerstick fasting (goal 60 mg/dl to 90 mg/dl) and two hours post prandial (goal less than 120 mg/dl)   Follow up with our office on Monday, 8/3 for evaluation of blood glucose levels   Non-stress testing two times weekly and TRESA testing beginning at 32 weeks gestation   Ultrasounds every 4 weeks at the [address]   Prescription was provided today for HbA1c every 6 to 8 weeks, CMP was completed on   Diabetes Self Management Support Plan outside of ongoing care: Spouse/Family    Thank you for the opportunity to participate in the care of this patient  I can be reached at [phone] should you have any questions  Time spent with patient 8:30-9:30 AM; time spent face to face counseling greater than 50% of the appointment        Nehal Haro RD,LDN,CDE  Diabetes Educator  Diabetes and Pregnancy Program

## 2020-07-30 NOTE — PROGRESS NOTES
Pt tolerated venofer infusion and B12 injection to L deltoid without difficulty  No s/s reaction noted  Next appt confirmed    Left ambulatory with AVS

## 2020-07-30 NOTE — PLAN OF CARE
Problem: Potential for Falls  Goal: Patient will remain free of falls  Description  INTERVENTIONS:  - Assess patient frequently for physical needs  -  Identify cognitive and physical deficits and behaviors that affect risk of falls  -  [place] fall precautions as indicated by assessment   - Educate patient/family on patient safety including physical limitations  - Instruct patient to call for assistance with activity based on assessment  - Modify environment to reduce risk of injury  - Consider OT/PT consult to assist with strengthening/mobility  Outcome: Progressing     Problem: Knowledge Deficit  Goal: Patient/family/caregiver demonstrates understanding of disease process, treatment plan, medications, and discharge instructions  Description  Complete learning assessment and assess knowledge base    Interventions:  - Provide teaching at level of understanding  - Provide teaching via preferred learning methods  Outcome: Progressing

## 2020-08-01 PROBLEM — O35.0XX0: Status: ACTIVE | Noted: 2020-08-01

## 2020-08-01 PROBLEM — IMO0002: Status: ACTIVE | Noted: 2020-08-01

## 2020-08-03 ENCOUNTER — APPOINTMENT (OUTPATIENT)
Dept: LAB | Age: 35
End: 2020-08-03
Payer: COMMERCIAL

## 2020-08-03 DIAGNOSIS — O24.419 GESTATIONAL DIABETES MELLITUS (GDM) IN THIRD TRIMESTER, GESTATIONAL DIABETES METHOD OF CONTROL UNSPECIFIED: ICD-10-CM

## 2020-08-03 LAB
EST. AVERAGE GLUCOSE BLD GHB EST-MCNC: 111 MG/DL
HBA1C MFR BLD: 5.5 %

## 2020-08-03 PROCEDURE — 83036 HEMOGLOBIN GLYCOSYLATED A1C: CPT

## 2020-08-03 PROCEDURE — 36415 COLL VENOUS BLD VENIPUNCTURE: CPT

## 2020-08-06 ENCOUNTER — HOSPITAL ENCOUNTER (OUTPATIENT)
Dept: INFUSION CENTER | Facility: HOSPITAL | Age: 35
Discharge: HOME | End: 2020-08-06
Attending: INTERNAL MEDICINE
Payer: COMMERCIAL

## 2020-08-06 ENCOUNTER — TELEPHONE (OUTPATIENT)
Dept: HEMATOLOGY ONCOLOGY | Facility: CLINIC | Age: 35
End: 2020-08-06

## 2020-08-06 VITALS
DIASTOLIC BLOOD PRESSURE: 65 MMHG | TEMPERATURE: 97.5 F | OXYGEN SATURATION: 99 % | HEART RATE: 101 BPM | SYSTOLIC BLOOD PRESSURE: 107 MMHG | RESPIRATION RATE: 16 BRPM

## 2020-08-06 DIAGNOSIS — D51.8 OTHER VITAMIN B12 DEFICIENCY ANEMIAS: ICD-10-CM

## 2020-08-06 DIAGNOSIS — O09.523 MULTIGRAVIDA OF ADVANCED MATERNAL AGE IN THIRD TRIMESTER: ICD-10-CM

## 2020-08-06 DIAGNOSIS — O09.813 PREGNANCY RESULTING FROM IN VITRO FERTILIZATION IN THIRD TRIMESTER: ICD-10-CM

## 2020-08-06 DIAGNOSIS — D50.8 IRON DEFICIENCY ANEMIA SECONDARY TO INADEQUATE DIETARY IRON INTAKE: Primary | ICD-10-CM

## 2020-08-06 DIAGNOSIS — Z3A.28 28 WEEKS GESTATION OF PREGNANCY: ICD-10-CM

## 2020-08-06 PROCEDURE — 96365 THER/PROPH/DIAG IV INF INIT: CPT

## 2020-08-06 PROCEDURE — 96372 THER/PROPH/DIAG INJ SC/IM: CPT

## 2020-08-06 RX ORDER — SODIUM CHLORIDE 9 MG/ML
20 INJECTION, SOLUTION INTRAVENOUS ONCE
Status: COMPLETED | OUTPATIENT
Start: 2020-08-06 | End: 2020-08-06

## 2020-08-06 RX ORDER — SODIUM CHLORIDE 9 MG/ML
20 INJECTION, SOLUTION INTRAVENOUS ONCE
Status: CANCELLED | OUTPATIENT
Start: 2020-08-13

## 2020-08-06 RX ORDER — CYANOCOBALAMIN 1000 UG/ML
1000 INJECTION INTRAMUSCULAR; SUBCUTANEOUS ONCE
Status: CANCELLED | OUTPATIENT
Start: 2020-08-13

## 2020-08-06 RX ORDER — CYANOCOBALAMIN 1000 UG/ML
1000 INJECTION INTRAMUSCULAR; SUBCUTANEOUS ONCE
Status: COMPLETED | OUTPATIENT
Start: 2020-08-06 | End: 2020-08-06

## 2020-08-06 RX ADMIN — IRON SUCROSE 200 MG: 20 INJECTION, SOLUTION INTRAVENOUS at 10:42

## 2020-08-06 RX ADMIN — CYANOCOBALAMIN 1000 MCG: 1000 INJECTION INTRAMUSCULAR; SUBCUTANEOUS at 10:49

## 2020-08-06 RX ADMIN — SODIUM CHLORIDE 20 ML/HR: 0.9 INJECTION, SOLUTION INTRAVENOUS at 10:40

## 2020-08-06 NOTE — PROGRESS NOTES
Pt here for #2/5 venofer/b12 today, states no side effects from first dose  Tolerated treatment well today without complications  Confirmed next appts and AVS declined

## 2020-08-06 NOTE — TELEPHONE ENCOUNTER
Patient called to report dark brown urine post Venofer infusion  Patient advised that this is a side effect of the Venofer infusion  Advised patient to push PO fluid and the color of the urine should return to normal     Please advise Dr Giancarlo Riddle

## 2020-08-06 NOTE — PLAN OF CARE
Problem: Potential for Falls  Goal: Patient will remain free of falls  Description: INTERVENTIONS:  - Assess patient frequently for physical needs  -  Identify cognitive and physical deficits and behaviors that affect risk of falls    -  [place] fall precautions as indicated by assessment   - Educate patient/family on patient safety including physical limitations  - Instruct patient to call for assistance with activity based on assessment  - Modify environment to reduce risk of injury  - Consider OT/PT consult to assist with strengthening/mobility  Outcome: Progressing

## 2020-08-07 ENCOUNTER — ROUTINE PRENATAL (OUTPATIENT)
Dept: OBGYN CLINIC | Facility: CLINIC | Age: 35
End: 2020-08-07

## 2020-08-07 ENCOUNTER — DOCUMENTATION (OUTPATIENT)
Dept: PERINATAL CARE | Facility: CLINIC | Age: 35
End: 2020-08-07

## 2020-08-07 VITALS
DIASTOLIC BLOOD PRESSURE: 72 MMHG | SYSTOLIC BLOOD PRESSURE: 110 MMHG | WEIGHT: 180.8 LBS | BODY MASS INDEX: 35.31 KG/M2 | TEMPERATURE: 98.2 F

## 2020-08-07 DIAGNOSIS — O24.414 INSULIN CONTROLLED GESTATIONAL DIABETES MELLITUS (GDM) IN THIRD TRIMESTER: Primary | ICD-10-CM

## 2020-08-07 DIAGNOSIS — Z34.83 PRENATAL CARE, SUBSEQUENT PREGNANCY, THIRD TRIMESTER: ICD-10-CM

## 2020-08-07 DIAGNOSIS — Z36.89 ENCOUNTER FOR OTHER SPECIFIED ANTENATAL SCREENING: Primary | ICD-10-CM

## 2020-08-07 DIAGNOSIS — Z3A.30 30 WEEKS GESTATION OF PREGNANCY: ICD-10-CM

## 2020-08-07 PROCEDURE — PNV: Performed by: OBSTETRICS & GYNECOLOGY

## 2020-08-07 PROCEDURE — 87591 N.GONORRHOEAE DNA AMP PROB: CPT | Performed by: OBSTETRICS & GYNECOLOGY

## 2020-08-07 PROCEDURE — 87491 CHLMYD TRACH DNA AMP PROBE: CPT | Performed by: OBSTETRICS & GYNECOLOGY

## 2020-08-07 RX ORDER — INSULIN GLARGINE 100 [IU]/ML
24 INJECTION, SOLUTION SUBCUTANEOUS
Qty: 5 PEN | Refills: 0 | Status: SHIPPED | OUTPATIENT
Start: 2020-08-07 | End: 2020-09-11 | Stop reason: SDUPTHER

## 2020-08-07 NOTE — PROGRESS NOTES
Date:  20  RE: Leonel Gandara    : 1985  Estimated Date of Delivery: 10/14/20  EGA: 30w2d  OB/GYN: Lydia Gasca glucose flow sheet       Current regimen:  1800 calorie gestational diabetes meal plan; 3 meals and 3 snacks daily  SMBG 4 times per day; fasting and 2 hrs pp with a OneTouch Verio blood glucose meter  Food diary revealed patient is often missing snacks, especially bedtime snack due to lack of appetite  FBG measurement is sometimes >10 hr fasting time frame  Walk 20-30 minutes after dinner if there are no exercise restrictions from OB  Plan:  Due to FBS>90, start Lantus 24 units at 9 PM daily  Add 3 AM glucose check  Be sure to have bedtime snack that includes 15 grams of carbohydrates with 2 to 3 ounces of protein  No more than 8 to 10 hours of fasting from bedtime snack to breakfast meal  Continue GDM diet and testing  Always have glucose available to treat hypoglycemia, use 15 by 15 rule  Recommendations given via phone  SMBG 4 x per day (Fasting, 2 hour after start of each meal) using OneTouch Verio Flex glucose meter  8/3/20 HbA1c 5 5% normal    Noted 20 US report; next US is 20  Diabetes Self Management Support Plan outside of ongoing care: Spouse/Family    Date due to report next:  Monday, 8/10/20 or sooner if needed         DAVINA Garnica, CDE, BC-ADM  [facility] Maternal Fetal Medicine  Diabetes and Pregnancy Program

## 2020-08-07 NOTE — PROGRESS NOTES
She is a gestational diabetic  She is waiting to hear from diabetic pathway with regards to possible insulin infusion  Her fasting blood sugars have been over 100  She was given a fetal kick count sheet  Reviewed hematology consult  She is heterozygous  May consider stopping Lovenox at 36 weeks gestation  They ask question about ventriculomegaly of the fetus

## 2020-08-07 NOTE — ASSESSMENT & PLAN NOTE
Due to FBS>90, start Lantus 24 units at 9 PM daily     Lab Results   Component Value Date    HGBA1C 5 5 08/03/2020

## 2020-08-07 NOTE — PATIENT INSTRUCTIONS
Repeat GC culture and Chlamydia culture performed  Awaiting discussion with diabetic pathway  She may need insulin  Hematology re-evaluation she is heterozygous for factor 5 Leiden consider stopping Lovenox at 36 weeks    Return to office in 2 weeks

## 2020-08-10 LAB
C TRACH DNA SPEC QL NAA+PROBE: NEGATIVE
N GONORRHOEA DNA SPEC QL NAA+PROBE: NEGATIVE

## 2020-08-13 ENCOUNTER — HOSPITAL ENCOUNTER (OUTPATIENT)
Dept: INFUSION CENTER | Facility: HOSPITAL | Age: 35
Discharge: HOME | End: 2020-08-13
Attending: INTERNAL MEDICINE
Payer: COMMERCIAL

## 2020-08-13 VITALS
DIASTOLIC BLOOD PRESSURE: 66 MMHG | HEART RATE: 98 BPM | SYSTOLIC BLOOD PRESSURE: 105 MMHG | TEMPERATURE: 98.6 F | RESPIRATION RATE: 18 BRPM

## 2020-08-13 DIAGNOSIS — D50.8 IRON DEFICIENCY ANEMIA SECONDARY TO INADEQUATE DIETARY IRON INTAKE: Primary | ICD-10-CM

## 2020-08-13 DIAGNOSIS — O09.813 PREGNANCY RESULTING FROM IN VITRO FERTILIZATION IN THIRD TRIMESTER: ICD-10-CM

## 2020-08-13 DIAGNOSIS — O09.523 MULTIGRAVIDA OF ADVANCED MATERNAL AGE IN THIRD TRIMESTER: ICD-10-CM

## 2020-08-13 DIAGNOSIS — Z3A.30 30 WEEKS GESTATION OF PREGNANCY: ICD-10-CM

## 2020-08-13 DIAGNOSIS — D51.8 OTHER VITAMIN B12 DEFICIENCY ANEMIAS: ICD-10-CM

## 2020-08-13 PROCEDURE — 96365 THER/PROPH/DIAG IV INF INIT: CPT

## 2020-08-13 PROCEDURE — 96372 THER/PROPH/DIAG INJ SC/IM: CPT

## 2020-08-13 RX ORDER — CYANOCOBALAMIN 1000 UG/ML
1000 INJECTION INTRAMUSCULAR; SUBCUTANEOUS ONCE
Status: CANCELLED | OUTPATIENT
Start: 2020-08-20

## 2020-08-13 RX ORDER — SODIUM CHLORIDE 9 MG/ML
20 INJECTION, SOLUTION INTRAVENOUS ONCE
Status: CANCELLED | OUTPATIENT
Start: 2020-08-20

## 2020-08-13 RX ORDER — CYANOCOBALAMIN 1000 UG/ML
1000 INJECTION INTRAMUSCULAR; SUBCUTANEOUS ONCE
Status: COMPLETED | OUTPATIENT
Start: 2020-08-13 | End: 2020-08-13

## 2020-08-13 RX ORDER — SODIUM CHLORIDE 9 MG/ML
20 INJECTION, SOLUTION INTRAVENOUS ONCE
Status: COMPLETED | OUTPATIENT
Start: 2020-08-13 | End: 2020-08-13

## 2020-08-13 RX ADMIN — IRON SUCROSE 200 MG: 20 INJECTION, SOLUTION INTRAVENOUS at 10:57

## 2020-08-13 RX ADMIN — SODIUM CHLORIDE 20 ML/HR: 0.9 INJECTION, SOLUTION INTRAVENOUS at 10:52

## 2020-08-13 RX ADMIN — CYANOCOBALAMIN 1000 MCG: 1000 INJECTION INTRAMUSCULAR; SUBCUTANEOUS at 11:57

## 2020-08-17 ENCOUNTER — DOCUMENTATION (OUTPATIENT)
Dept: PERINATAL CARE | Facility: CLINIC | Age: 35
End: 2020-08-17

## 2020-08-17 NOTE — PROGRESS NOTES
Date:  20  RE: Aide Lewis    : 1985  Estimated Date of Delivery: 10/14/20  EGA: 31w5d  OB/GYN: Socrates Gasca glucose flow sheet       Current regimen:  Lantus 24 units at 9 PM daily  (Started Lantus on 20)  1800 calorie gestational diabetes meal plan; 3 meals and 3 snacks daily  bedtime snack that includes 15 grams of carbohydrates with 2 to 3 ounces of protein  No more than 8 to 10 hours of fasting from bedtime snack to breakfast meal    SMBG 4 times per day; fasting and 2 hrs pp with a OneTouch Verio blood glucose meter  Walk 20-30 minutes after dinner if there are no exercise restrictions from OB  Plan:  Due to FBS>90, increase Lantus to 32 units at 9 PM daily  Continue GDM diet and testing  Always have glucose available to treat hypoglycemia, use 15 by 15 rule  SMBG 4 x per day (Fasting, 2 hour after start of each meal) using OneTouch Verio Flex glucose meter  8/3/20 HbA1c 5 5% normal    20 fetal growth appeared normal and TRESA normal       Diabetes Self Management Support Plan outside of ongoing care: Spouse/Family    Date due to report next:  Thursday, 20 or sooner if needed         DAVINA Sue, CDE, BC-ADM  [facility] Maternal Fetal Medicine  Diabetes and Pregnancy Program

## 2020-08-20 ENCOUNTER — HOSPITAL ENCOUNTER (OUTPATIENT)
Dept: INFUSION CENTER | Facility: HOSPITAL | Age: 35
Discharge: HOME | End: 2020-08-20
Attending: INTERNAL MEDICINE
Payer: COMMERCIAL

## 2020-08-20 VITALS
HEART RATE: 100 BPM | RESPIRATION RATE: 18 BRPM | TEMPERATURE: 98 F | DIASTOLIC BLOOD PRESSURE: 70 MMHG | SYSTOLIC BLOOD PRESSURE: 121 MMHG

## 2020-08-20 DIAGNOSIS — D51.8 OTHER VITAMIN B12 DEFICIENCY ANEMIAS: ICD-10-CM

## 2020-08-20 DIAGNOSIS — Z3A.30 30 WEEKS GESTATION OF PREGNANCY: ICD-10-CM

## 2020-08-20 DIAGNOSIS — O09.523 MULTIGRAVIDA OF ADVANCED MATERNAL AGE IN THIRD TRIMESTER: ICD-10-CM

## 2020-08-20 DIAGNOSIS — D50.8 IRON DEFICIENCY ANEMIA SECONDARY TO INADEQUATE DIETARY IRON INTAKE: Primary | ICD-10-CM

## 2020-08-20 DIAGNOSIS — O09.813 PREGNANCY RESULTING FROM IN VITRO FERTILIZATION IN THIRD TRIMESTER: ICD-10-CM

## 2020-08-20 PROCEDURE — 96365 THER/PROPH/DIAG IV INF INIT: CPT

## 2020-08-20 PROCEDURE — 96372 THER/PROPH/DIAG INJ SC/IM: CPT

## 2020-08-20 RX ORDER — SODIUM CHLORIDE 9 MG/ML
20 INJECTION, SOLUTION INTRAVENOUS ONCE
Status: CANCELLED | OUTPATIENT
Start: 2020-08-27

## 2020-08-20 RX ORDER — CYANOCOBALAMIN 1000 UG/ML
1000 INJECTION INTRAMUSCULAR; SUBCUTANEOUS ONCE
Status: CANCELLED | OUTPATIENT
Start: 2020-08-27

## 2020-08-20 RX ORDER — SODIUM CHLORIDE 9 MG/ML
20 INJECTION, SOLUTION INTRAVENOUS ONCE
Status: COMPLETED | OUTPATIENT
Start: 2020-08-20 | End: 2020-08-20

## 2020-08-20 RX ORDER — CYANOCOBALAMIN 1000 UG/ML
1000 INJECTION INTRAMUSCULAR; SUBCUTANEOUS ONCE
Status: COMPLETED | OUTPATIENT
Start: 2020-08-20 | End: 2020-08-20

## 2020-08-20 RX ADMIN — CYANOCOBALAMIN 1000 MCG: 1000 INJECTION INTRAMUSCULAR; SUBCUTANEOUS at 10:51

## 2020-08-20 RX ADMIN — SODIUM CHLORIDE 20 ML/HR: 0.9 INJECTION, SOLUTION INTRAVENOUS at 10:45

## 2020-08-20 RX ADMIN — IRON SUCROSE 200 MG: 20 INJECTION, SOLUTION INTRAVENOUS at 11:13

## 2020-08-20 NOTE — PLAN OF CARE
Problem: Potential for Falls  Goal: Patient will remain free of falls  Description: INTERVENTIONS:  - Assess patient frequently for physical needs  -  Identify cognitive and physical deficits and behaviors that affect risk of falls    -  [place] fall precautions as indicated by assessment   - Educate patient/family on patient safety including physical limitations  - Instruct patient to call for assistance with activity based on assessment  - Modify environment to reduce risk of injury  - Consider OT/PT consult to assist with strengthening/mobility  Outcome: Progressing

## 2020-08-21 ENCOUNTER — ROUTINE PRENATAL (OUTPATIENT)
Dept: OBGYN CLINIC | Facility: CLINIC | Age: 35
End: 2020-08-21

## 2020-08-21 VITALS
SYSTOLIC BLOOD PRESSURE: 114 MMHG | TEMPERATURE: 98 F | BODY MASS INDEX: 35.23 KG/M2 | WEIGHT: 180.4 LBS | DIASTOLIC BLOOD PRESSURE: 60 MMHG

## 2020-08-21 DIAGNOSIS — Z34.83 PRENATAL CARE, SUBSEQUENT PREGNANCY, THIRD TRIMESTER: Primary | ICD-10-CM

## 2020-08-21 PROCEDURE — PNV: Performed by: OBSTETRICS & GYNECOLOGY

## 2020-08-25 ENCOUNTER — TELEPHONE (OUTPATIENT)
Dept: PERINATAL CARE | Facility: OTHER | Age: 35
End: 2020-08-25

## 2020-08-25 NOTE — TELEPHONE ENCOUNTER
Spoke with patient and confirmed appointment with MFM  1 support person ( must be over age of 15) may accompany patient  Will you and your support person be able to wear a mask ,without a valve , during entire appointment? yes   To minimize your exposure in our waiting area,check in and rooming questions will be done via phone  When you arrive in the parking lot please call the following inside line # prior to entering office:    Christopher Nadege [phone] line: 179 [facility] line:  [address] line: [phone]  [facility] line:  [phone]  [place] line: [phone]    Have you or your support person traveled outside the state in the last 2 weeks? no  If yes, what state did you travel to? no    Do you or your support person have:  Fever or flu- like symptoms? No   Symptoms of upper respiratory infection like runny nose, sore throat or cough? no  Do you have new headache that you have not had in the past? No   Have you experienced any new shortness of breath recently? No   Do you have any new loss of taste or smell? no  Do you have any new diarrhea, nausea or vomiting? no  Have you recently been in contact with anyone who has been sick or diagnosed with COVID-19 infection? No   Have you been recommended to quarantine because of an exposure to a confirmed positive COVID19 person? no  You and your support person will have temperature screening upon arrival   Patient verbalized understanding of all instructions

## 2020-08-26 ENCOUNTER — TELEPHONE (OUTPATIENT)
Dept: HEMATOLOGY ONCOLOGY | Facility: CLINIC | Age: 35
End: 2020-08-26

## 2020-08-26 ENCOUNTER — ULTRASOUND (OUTPATIENT)
Dept: PERINATAL CARE | Facility: OTHER | Age: 35
End: 2020-08-26
Payer: COMMERCIAL

## 2020-08-26 VITALS
DIASTOLIC BLOOD PRESSURE: 82 MMHG | BODY MASS INDEX: 35.41 KG/M2 | HEART RATE: 112 BPM | TEMPERATURE: 100.1 F | WEIGHT: 180.34 LBS | SYSTOLIC BLOOD PRESSURE: 122 MMHG | HEIGHT: 60 IN

## 2020-08-26 DIAGNOSIS — Z3A.33 33 WEEKS GESTATION OF PREGNANCY: Primary | ICD-10-CM

## 2020-08-26 DIAGNOSIS — Z3A.33 33 WEEKS GESTATION OF PREGNANCY: ICD-10-CM

## 2020-08-26 PROCEDURE — NC001 PR NO CHARGE: Performed by: OBSTETRICS & GYNECOLOGY

## 2020-08-26 PROCEDURE — U0003 INFECTIOUS AGENT DETECTION BY NUCLEIC ACID (DNA OR RNA); SEVERE ACUTE RESPIRATORY SYNDROME CORONAVIRUS 2 (SARS-COV-2) (CORONAVIRUS DISEASE [COVID-19]), AMPLIFIED PROBE TECHNIQUE, MAKING USE OF HIGH THROUGHPUT TECHNOLOGIES AS DESCRIBED BY CMS-2020-01-R: HCPCS | Performed by: OBSTETRICS & GYNECOLOGY

## 2020-08-26 NOTE — TELEPHONE ENCOUNTER
Patient called stating that she has a fever and being tested for covid 19 and will need to cancel her 8//27/2020 infusion treatment and her 8/28/2020 follow up appt

## 2020-08-26 NOTE — LETTER
August 27, 2020     Guanako Sandoval MD  [address]  [facility]  [address] [place] [address]    Patient: Juan Miguel Gibbons   YOB: 1985   Date of Visit: 8/26/2020       Dear Dr Oni Smalls: Thank you for referring Juan Miguel Gibbons to me for evaluation  Below are my notes for this consultation  If you have questions, please do not hesitate to call me  I look forward to following your patient along with you  Sincerely,        Cain Goldmann, MD        CC: No Recipients  Cain Goldmann, MD  8/27/2020  1:40 PM  Sign when Signing Visit  Washington Bermudez presented for ultrasound evaluation at the [facility]FM office today  Her temperature at initial evaluation was 100 1  She did not admit to shortness of breath, cough, or other potential symptoms of COVID-19  I recommended, and ordered COVID-19 testing for [place] Bermudez which she will have obtained today, and postponed her fetal ultrasound evaluation and [facility] visit until COVID-19 status could be obtained

## 2020-08-26 NOTE — TELEPHONE ENCOUNTER
I called infusion and spoke with Jneny Mcdowell to cancel pt's appt on 8/27/2020    I cancelled the appt with Dr Patsy Mcnally on 8/28/2020

## 2020-08-27 ENCOUNTER — TELEPHONE (OUTPATIENT)
Dept: PERINATAL CARE | Facility: CLINIC | Age: 35
End: 2020-08-27

## 2020-08-27 ENCOUNTER — HOSPITAL ENCOUNTER (OUTPATIENT)
Dept: INFUSION CENTER | Facility: HOSPITAL | Age: 35
Discharge: HOME | End: 2020-08-27
Attending: INTERNAL MEDICINE

## 2020-08-27 ENCOUNTER — DOCUMENTATION (OUTPATIENT)
Dept: PERINATAL CARE | Facility: CLINIC | Age: 35
End: 2020-08-27

## 2020-08-27 LAB — SARS-COV-2 RNA SPEC QL NAA+PROBE: NOT DETECTED

## 2020-08-27 NOTE — PROGRESS NOTES
Kiel Casarez presented for ultrasound evaluation at the [facility] MFM office today  Her temperature at initial evaluation was 100 1  She did not admit to shortness of breath, cough, or other potential symptoms of COVID-19  I recommended, and ordered COVID-19 testing for Kiel Hole which she will have obtained today, and postponed her fetal ultrasound evaluation and [facility] visit until COVID-19 status could be obtained

## 2020-08-27 NOTE — TELEPHONE ENCOUNTER
Patient called to reschedule appointment that had been cancelled on 8/26/20  She had a fever of 100 0 and was referred to hospital to get tested for covid-19  Results came back negative  Patient states she never had symptoms and tried to explain to the staff that she was waiting outside and she was over dressed due to her [employer]  She states her temperature today was 97 0  She has been r/s to 8/28/20 and was told to try to remain in her car until they call her back into the office so that we do not get an abn  Temp  Lenora Jean nurse aware

## 2020-08-28 ENCOUNTER — ULTRASOUND (OUTPATIENT)
Dept: PERINATAL CARE | Facility: OTHER | Age: 35
End: 2020-08-28
Payer: COMMERCIAL

## 2020-08-28 VITALS
DIASTOLIC BLOOD PRESSURE: 64 MMHG | WEIGHT: 181.4 LBS | SYSTOLIC BLOOD PRESSURE: 126 MMHG | TEMPERATURE: 97.4 F | BODY MASS INDEX: 35.61 KG/M2 | HEART RATE: 86 BPM | HEIGHT: 60 IN

## 2020-08-28 DIAGNOSIS — O24.414 INSULIN CONTROLLED GESTATIONAL DIABETES MELLITUS (GDM) IN THIRD TRIMESTER: Primary | ICD-10-CM

## 2020-08-28 DIAGNOSIS — O09.893 ENCOUNTER FOR SUPERVISION OF HIGH RISK PREGNANCY DUE TO FETAL ANOMALY, THIRD TRIMESTER: ICD-10-CM

## 2020-08-28 DIAGNOSIS — O09.523 MULTIGRAVIDA OF ADVANCED MATERNAL AGE IN THIRD TRIMESTER: ICD-10-CM

## 2020-08-28 DIAGNOSIS — O09.813 PREGNANCY RESULTING FROM IN VITRO FERTILIZATION IN THIRD TRIMESTER: ICD-10-CM

## 2020-08-28 DIAGNOSIS — D68.51 FACTOR V LEIDEN (HCC): ICD-10-CM

## 2020-08-28 DIAGNOSIS — O35.0XX0: ICD-10-CM

## 2020-08-28 DIAGNOSIS — Z3A.33 33 WEEKS GESTATION OF PREGNANCY: ICD-10-CM

## 2020-08-28 PROCEDURE — 76816 OB US FOLLOW-UP PER FETUS: CPT | Performed by: OBSTETRICS & GYNECOLOGY

## 2020-08-28 PROCEDURE — 99213 OFFICE O/P EST LOW 20 MIN: CPT | Performed by: OBSTETRICS & GYNECOLOGY

## 2020-08-28 NOTE — PROGRESS NOTES
Jose Dam has no complaints today  She reports regular fetal movements and denies problems related to hypertension, gestational diabetes,  labor, or vaginal bleeding  Her prenatal course has apparently been unremarkable in the interim since her most recent visit here  Problem list:  1  IVF pregnancy conceived in [place] ([facility])  2  Advanced maternal age declined genetic screening  3  Factor 5 Leiden heterozygote without history of thrombosis currently on Lovenox prophylaxis 40 milligrams daily  4  GDM A2 currently on Lantus 32 units at 9 p m  daily  She is very compliant with monitoring her sugars  5  Iron deficiency anemia and following with Hematology  6  Subclinical hypothyroidism with a TSH of 2 02 on 20  7  Left-sided fetal pyelectasis of 6 millimeters was noted at 26 weeks  8  At 28 weeks bilateral very mild ventriculomegaly was noted with measurements at 1 centimeter for both sides with a prominent 3rd ventricle  9  During her fetal echo the velocity across the aortic arch and aortic valve was over 100 centimeters/second suggesting a possible mild coarct that could not be seen in 2D or with color doppler    Ultrasound findings: The ultrasound today shows normal interval fetal growth and fluid  Today the fetal pyelectasis measures between 4-5 millimeters bilaterally which is a normal variant  Multiple views of the cranial anatomy shows prominent anterior horns, prominent 3rd ventricle measuring 4 millimeters and prominent atria The left measures 0 84 centimeters which is normal   The right measures 1 05 centimeters which is mildly elevated  Neither lateral ventricle shows dangling of the choroid plexus suggesting this may be a normal variant  The velocity across the aortic arch continues to be elevated but very difficult to see today secondary to fetal position and late gestational age    The prior limited anatomy of the right forearm, right hand, right foot and placental cord insertion are still limited today  Pregnancy ultrasound has limitations and is unable to detect all forms of fetal congenital abnormalities  The inaccuracy in the EFW can be off by 1 lb either way in the third trimester  Follow up: She will have 1 last ultrasound in 3 weeks for growth, review of the mild cranial ventriculomegaly and fetal echo with Pediatric Cardiology due to the increased velocity found in the aortic arch and aortic valve suggesting a possible small Coarct  Pending the results of her next [address] she will likely require a  US to follow up the cranial anatomy  She needs to start twice weekly NSTs and weekly TRESA secondary to her diagnosis of GDM A2  Recommend daily fetal kicks till delivery  Recommend a vaginal delivery be planned for between 39-39w6d  Due to the suspicion for mild hydrocephaly would recommend avoiding an operative vaginal delivery unless it is necessary to hasten the delivery of her baby in a quicker fashion then going for a CS for the babies benefit  The majority of time ( greater than 50 percent) was spent on counseling and coordination of care with the patient and her family member  Approximately physician face-to-face time was 15 minutes        Anna Greene MD

## 2020-08-28 NOTE — Clinical Note
Please add on growth scan to her fetal echo and she needs to start twice weekly nst and weekly afis asap     She is aware someone will call to schedule the nsts and afis  Thanks    Joan Franco

## 2020-08-28 NOTE — LETTER
2020     Verner Drafts, MD  [address]  [address] [address]    Patient: Maura Yepez   YOB: 1985   Date of Visit: 2020       Dear Dr Maria Elena Vasquez: Thank you for referring Maura Yepez to me for evaluation  Below are my notes for this consultation  If you have questions, please do not hesitate to call me  I look forward to following your patient along with you  Sincerely,        Deisy Chi MD        CC: No Recipients  Deisy Chi MD  2020  7:45 AM  Incomplete  Maura Yepez has no complaints today  She reports regular fetal movements and denies problems related to hypertension, gestational diabetes,  labor, or vaginal bleeding  Her prenatal course has apparently been unremarkable in the interim since her most recent visit here  Problem list:  1  IVF pregnancy conceived in [place] ([facility])  2  Advanced maternal age declined genetic screening  3  Factor 5 Leiden heterozygote without history of thrombosis currently on Lovenox prophylaxis 40 milligrams daily  4  GDM A2 currently on Lantus 32 units at 9 p m  daily  She is very compliant with monitoring her sugars  5  Iron deficiency anemia and following with Hematology  6  Subclinical hypothyroidism with a TSH of 2 02 on 20  7  Left-sided fetal pyelectasis of 6 millimeters was noted at 26 weeks  8  At 28 weeks bilateral very mild ventriculomegaly was noted with measurements at 1 centimeter for both sides with a prominent 3rd ventricle  9  During her fetal echo the velocity across the aortic arch and aortic valve was over 100 centimeters/second suggesting a possible mild coarct that could not be seen in 2D or with color doppler    Ultrasound findings: The ultrasound today shows normal interval fetal growth and fluid  Today the fetal pyelectasis measures between 4-5 millimeters bilaterally which is a normal variant    Multiple views of the cranial anatomy shows prominent anterior horns, prominent 3rd ventricle measuring 4 millimeters and prominent atria The left measures 0 84 centimeters which is normal   The right measures 1 05 centimeters which is mildly elevated  Neither lateral ventricle shows dangling of the choroid plexus suggesting this may be a normal variant  The velocity across the aortic arch continues to be elevated but very difficult to see today secondary to fetal position and late gestational age  The prior limited anatomy of the right forearm, right hand, right foot and placental cord insertion are still limited today  Pregnancy ultrasound has limitations and is unable to detect all forms of fetal congenital abnormalities  The inaccuracy in the EFW can be off by 1 lb either way in the third trimester  Follow up: She will have 1 last ultrasound in 3 weeks for growth, review of the mild cranial ventriculomegaly and fetal echo with Pediatric Cardiology due to the increased velocity found in the aortic arch and aortic valve suggesting a possible small Coarct  Pending the results of her next [employer] she will likely require a  US to follow up the cranial anatomy  She needs to start twice weekly NSTs and weekly TRESA secondary to her diagnosis of GDM A2  Recommend daily fetal kicks till delivery  Recommend a vaginal delivery be planned for between 39-39w6d  Due to the suspicion for mild hydrocephaly would recommend avoiding an operative vaginal delivery unless it is necessary to hasten the delivery of her baby in a quicker fashion then going for a CS for the babies benefit  The majority of time ( greater than 50 percent) was spent on counseling and coordination of care with the patient and her family member  Approximately physician face-to-face time was 15 minutes        Oscar Velasquez MD

## 2020-08-28 NOTE — Clinical Note
I added to her plan since she was discharged Friday and sent her a Soneter  Please call to schedule      Leo Abbasi

## 2020-08-30 PROBLEM — O03.9 SPONTANEOUS ABORTION: Status: RESOLVED | Noted: 2017-10-09 | Resolved: 2020-08-30

## 2020-08-30 PROBLEM — Z3A.33 33 WEEKS GESTATION OF PREGNANCY: Status: ACTIVE | Noted: 2020-07-09

## 2020-08-31 ENCOUNTER — TELEPHONE (OUTPATIENT)
Dept: HEMATOLOGY ONCOLOGY | Facility: CLINIC | Age: 35
End: 2020-08-31

## 2020-08-31 ENCOUNTER — TELEPHONE (OUTPATIENT)
Dept: PERINATAL CARE | Facility: CLINIC | Age: 35
End: 2020-08-31

## 2020-08-31 NOTE — TELEPHONE ENCOUNTER
Scheduling Appointment     Who Is Calling to Schedule Patient    Doctor Yuniel Munoz Aspen   Date and Time 09/11 at 8:30am         Patient verbalized understanding    Yes

## 2020-08-31 NOTE — TELEPHONE ENCOUNTER
Spoke with patient and confirmed appointment with [facility]  1 support person ( must be over age of 15) may accompany patient  Will you and your support person be able to wear a mask ,without a valve , during entire appointment? YES   To minimize your exposure in our waiting area,check in and rooming questions will be done via phone  When you arrive in the parking lot please call the following inside line # prior to entering office:      [place] line: [phone]      Have you or your support person traveled outside the state in the last 2 weeks? NO  If yes, what state did you travel to? NO    Do you or your support person have:  Fever or flu- like symptoms? NO  Symptoms of upper respiratory infection like runny nose, sore throat or cough? NO  Do you have new headache that you have not had in the past?NO  Have you experienced any new shortness of breath recently? NO  Do you have any new loss of taste or smell? NO  Do you have any new diarrhea, nausea or vomiting? NO  Have you recently been in contact with anyone who has been sick or diagnosed with COVID-19 infection? NO  Have you been recommended to quarantine because of an exposure to a confirmed positive COVID19 person? NO  You and your support person will have temperature screening upon arrival   Patient verbalized understanding of all instructions   -------------------------------------------------------------

## 2020-09-01 ENCOUNTER — ROUTINE PRENATAL (OUTPATIENT)
Dept: PERINATAL CARE | Facility: CLINIC | Age: 35
End: 2020-09-01
Payer: COMMERCIAL

## 2020-09-01 ENCOUNTER — TELEPHONE (OUTPATIENT)
Dept: PERINATAL CARE | Facility: OTHER | Age: 35
End: 2020-09-01

## 2020-09-01 ENCOUNTER — DOCUMENTATION (OUTPATIENT)
Dept: PERINATAL CARE | Facility: CLINIC | Age: 35
End: 2020-09-01

## 2020-09-01 VITALS
BODY MASS INDEX: 35.61 KG/M2 | WEIGHT: 181.4 LBS | DIASTOLIC BLOOD PRESSURE: 56 MMHG | HEIGHT: 60 IN | HEART RATE: 97 BPM | SYSTOLIC BLOOD PRESSURE: 112 MMHG | TEMPERATURE: 97 F

## 2020-09-01 DIAGNOSIS — O24.414 INSULIN CONTROLLED GESTATIONAL DIABETES MELLITUS (GDM) IN THIRD TRIMESTER: ICD-10-CM

## 2020-09-01 DIAGNOSIS — Z3A.33 33 WEEKS GESTATION OF PREGNANCY: Primary | ICD-10-CM

## 2020-09-01 PROCEDURE — 59025 FETAL NON-STRESS TEST: CPT | Performed by: OBSTETRICS & GYNECOLOGY

## 2020-09-01 NOTE — LETTER
NST sleeve cover sheet    Patient name: Pradip Tolbert  : 1985  MRN: [id number]    BEATRIZ: Estimated Date of Delivery: 10/14/20    Obstetrician: Salas Healy    Reason(s) for testing:  GDM  __________________________________________      Testing frequency:    _X__ 2x/wk  ___ 1x/wk  ___ Dopplers  ___ BPP?       Last growth scan: __________________________________________

## 2020-09-01 NOTE — TELEPHONE ENCOUNTER
Per Dr Bri Smith Left message for pt to call me back to [facility] her nst's 2xw and tu's both phone numbers were given to pt to call us back

## 2020-09-01 NOTE — PROGRESS NOTES
Date:  20  RE: Alana Jeronimo    : 1985  Estimated Date of Delivery: 10/14/20  EGA: 33w6d  OB/GYN: Giovanny Starr      Copied from [employer] glucose flow sheet  Patient shared in message FBG today was 118  Current regimen:  Lantus 38 units at bedtime  1800 calorie gestational diabetes meal plan; 3 meals/snacks including balance of carbohydrate, protein and fat with  bedtime snack of 1 carbohydrate with 2 to 3 protein servings  No more than 8 to 10 hours of fasting from bedtime snack to breakfast meal    SMBG  fasting and 2 hrs after start of meals with a OneTouch Verio meter  Walk 20-30 minutes after dinner if there are no exercise restrictions from OB  Plan:  Lantus- increase to 44 units at bedtime   Always have glucose available to treat hypoglycemia, use 15 by 15 rule     Continue and SMBG   8/3/20 HbA1c 5 5% normal    20 fetal growth and TRESA appeared normal     Diabetes Self Management Support Plan outside of ongoing care: Spouse/Family    Date due to report next:  Tuesday, 20 or sooner if BG continues outside target ranges     Yaritza Alston, RN BS CDE  Diabetes Educator  [facility] Maternal Fetal Medicine  Diabetes and Pregnancy Program

## 2020-09-01 NOTE — PROGRESS NOTES
Reactive NST at  33 6/7 weeks    Indication  A2GDM    Plan  2 x wk NST    Daily fetal movement counts    Amparo Gann MD

## 2020-09-02 ENCOUNTER — TELEPHONE (OUTPATIENT)
Dept: HEMATOLOGY ONCOLOGY | Facility: CLINIC | Age: 35
End: 2020-09-02

## 2020-09-02 ENCOUNTER — TELEPHONE (OUTPATIENT)
Dept: PERINATAL CARE | Facility: OTHER | Age: 35
End: 2020-09-02

## 2020-09-02 ENCOUNTER — TELEPHONE (OUTPATIENT)
Dept: PERINATAL CARE | Facility: CLINIC | Age: 35
End: 2020-09-02

## 2020-09-02 NOTE — TELEPHONE ENCOUNTER
Attempted to reach patient by phone and left voicemail to confirm appointment for MFM ultrasound  1 support person (must be over the age of 15) may accompany you for your appointment  If you or your support person have traveled outside the state in the past 2 weeks, please call and notify our office today #[phone]  You and your support person must wear a mask ,covering nose and mouth,during your entire visit  You and your support person will have temperature screened upon arrival     To minimize your exposure in our waiting room, please call our office prior to entering the building  Check in and rooming questions will be done via phone  We will give you directions when to enter for your appointment  Inside office # provided:    [place] line:  [phone]    IF you are not feeling well- cough, fever, shortness of breath or any flu like symptoms, contact your primary care physician or [phone]    Any questions with these instructions please call Maternal Fetal Medicine nurse line today @ # [phone]

## 2020-09-02 NOTE — TELEPHONE ENCOUNTER
Per Dr Isaac Arreaga left message for pt to call us back to matilde a growth scan with her tu in 4wks from 9/1/20  Both numbers were given to pt

## 2020-09-02 NOTE — TELEPHONE ENCOUNTER
Called pt and left message that she can call [facility] directly to schedule her last infusion, [phone]  Also stated is she has any issues to please call the office back and we will assist her, [phone]

## 2020-09-03 ENCOUNTER — ROUTINE PRENATAL (OUTPATIENT)
Dept: OBGYN CLINIC | Facility: CLINIC | Age: 35
End: 2020-09-03
Payer: COMMERCIAL

## 2020-09-03 ENCOUNTER — ULTRASOUND (OUTPATIENT)
Dept: PERINATAL CARE | Facility: CLINIC | Age: 35
End: 2020-09-03
Payer: COMMERCIAL

## 2020-09-03 VITALS
TEMPERATURE: 97.9 F | DIASTOLIC BLOOD PRESSURE: 76 MMHG | SYSTOLIC BLOOD PRESSURE: 114 MMHG | WEIGHT: 180 LBS | BODY MASS INDEX: 35.34 KG/M2 | HEIGHT: 60 IN

## 2020-09-03 VITALS
TEMPERATURE: 98.2 F | BODY MASS INDEX: 35.53 KG/M2 | WEIGHT: 181 LBS | DIASTOLIC BLOOD PRESSURE: 79 MMHG | SYSTOLIC BLOOD PRESSURE: 129 MMHG | HEIGHT: 60 IN | HEART RATE: 94 BPM

## 2020-09-03 DIAGNOSIS — Z34.83 PRENATAL CARE, SUBSEQUENT PREGNANCY, THIRD TRIMESTER: Primary | ICD-10-CM

## 2020-09-03 DIAGNOSIS — Z23 NEED FOR TDAP VACCINATION: ICD-10-CM

## 2020-09-03 DIAGNOSIS — O24.414 INSULIN CONTROLLED GESTATIONAL DIABETES MELLITUS (GDM) IN THIRD TRIMESTER: Primary | ICD-10-CM

## 2020-09-03 DIAGNOSIS — Z3A.34 34 WEEKS GESTATION OF PREGNANCY: ICD-10-CM

## 2020-09-03 PROCEDURE — 90715 TDAP VACCINE 7 YRS/> IM: CPT | Performed by: NURSE PRACTITIONER

## 2020-09-03 PROCEDURE — 76815 OB US LIMITED FETUS(S): CPT | Performed by: OBSTETRICS & GYNECOLOGY

## 2020-09-03 PROCEDURE — PNV: Performed by: NURSE PRACTITIONER

## 2020-09-03 PROCEDURE — 59025 FETAL NON-STRESS TEST: CPT | Performed by: OBSTETRICS & GYNECOLOGY

## 2020-09-03 PROCEDURE — 90471 IMMUNIZATION ADMIN: CPT | Performed by: NURSE PRACTITIONER

## 2020-09-03 NOTE — LETTER
September 3, 2020     Yesi Sherman, [address] [place] [address]    Patient: Leonel Gandara   YOB: 1985   Date of Visit: 9/3/2020       Dear Dr Radha Oates: Thank you for referring Leonel Gandara to me for evaluation  Below are my notes for this consultation  If you have questions, please do not hesitate to call me  I look forward to following your patient along with you  Sincerely,        Jesus Lee MD        CC: No Recipients  Jesus Lee MD  9/3/2020  7:48 PM  Sign when Signing Visit  NST is reactive   Jesus Lee MD

## 2020-09-03 NOTE — PROGRESS NOTES
Pascual Mckeon is doing well  Denies LOF/Bleeding/Cramping  +FM    Scan on 8/28/2020 at 33 2/7 weeks  EFW 5 lbs (51%)  Recommend a vaginal delivery be planned for between 39-39w6d  Due to the suspicion for mild hydrocephaly would recommend avoiding an operative  vaginal delivery unless it is necessary to hasten the delivery of her baby in a quicker fashion then going for a CS for the babies benefit  NST started this week  Sugars are controlled  Perineal massage handout given and reviewed with patient  Patient advised to schedule next appt with JSW to discuss induction at 39 weeks per the recommendation of the Dr Jose Geronimo  Continue fetal kick counts  RTO in 2 weeks

## 2020-09-03 NOTE — PATIENT INSTRUCTIONS

## 2020-09-04 ENCOUNTER — TELEPHONE (OUTPATIENT)
Dept: PERINATAL CARE | Facility: CLINIC | Age: 35
End: 2020-09-04

## 2020-09-04 NOTE — TELEPHONE ENCOUNTER
Spoke with patient and confirmed appointment with MFM  1 support person ( must be over age of 15) may accompany patient  Will you and your support person be able to wear a mask ,without a valve , during entire appointment? YES   To minimize your exposure in our waiting area,check in and rooming questions will be done via phone  When you arrive in the parking lot please call the following inside line # prior to entering office:    [place] [phone] line: 280 [facility] line:  [address] line: [phone]  [place] line:  [phone]  [place] line: [phone]    Have you or your support person traveled outside the state in the last 2 weeks? NO   If yes, what state did you travel to? NO     Do you or your support person have:  Fever or flu- like symptoms? NO  Symptoms of upper respiratory infection like runny nose, sore throat or cough? NO  Do you have new headache that you have not had in the past?NO  Have you experienced any new shortness of breath recently? NO  Do you have any new loss of taste or smell? NO  Do you have any new diarrhea, nausea or vomiting? NO  Have you recently been in contact with anyone who has been sick or diagnosed with COVID-19 infection? NO  Have you been recommended to quarantine because of an exposure to a confirmed positive COVID19 person? NO  You and your support person will have temperature screening upon arrival   Patient verbalized understanding of all instructions   -------------------------------------------------------------        IF you are not feeling well- cough, fever, shortness of breath or any flu like symptoms, contact your primary care physician or [address]    Any questions with these instructions please call Maternal Fetal Medicine nurse line today @ # [phone]

## 2020-09-05 ENCOUNTER — DOCUMENTATION (OUTPATIENT)
Dept: OTHER | Facility: OTHER | Age: 35
End: 2020-09-05

## 2020-09-05 ENCOUNTER — NURSE TRIAGE (OUTPATIENT)
Dept: OTHER | Facility: OTHER | Age: 35
End: 2020-09-05

## 2020-09-05 NOTE — TELEPHONE ENCOUNTER
Reason for Disposition   Minor thermal burn    Additional Information   Negative: [1] Difficulty breathing AND [2] exposure to fire, smoke, or fumes   Negative: Shock suspected (e g , cold/pale/clammy skin, too weak to stand, low BP, rapid pulse)   Negative: Difficult to awaken or acting confused (e g , disoriented, slurred speech)   Negative: [1] Burn area larger than 10 palms of hand (> 10% BSA) AND [2] blisters   Negative: Sounds like a life-threatening emergency to the triager   Negative: Smoke inhalation is main concern   Negative: Sunburn   Negative: Electrical burn   Negative: Chemical burn   Negative: Burn area larger than 4 palms of hand (> 4% BSA)   Negative: Burn completely circles an arm or leg   Negative: Caused by explosion or gunpowder   Negative: [1] Caused by very hot substance AND [2] center of burn is white (or charred)   Negative: [1] Blister (intact or ruptured) AND [2] larger than 2 inches (5 cm)   Negative: [1] Blister (intact or ruptured) on the hand AND [2] larger  than 1 inch (2 5 cm)   Negative: [1] Blister (intact or ruptured) AND [2] on the face, neck, or genitals   Negative: [1] Headache or nausea AND [2] exposure to fire, smoke, or fumes   Negative: Sounds like a serious injury to the triager   Negative: [1] SEVERE pain (e g , excruciating) AND [2] not improved 2 hours after pain medicine   Negative: [1] Looks infected (red streaks, spreading red area) AND [2] fever   Negative: Suspicious history for the burn   Negative: [1] Broken (ruptured) blister AND [2] caller doesn't want to trim the dead skin   Negative: [1] Looks infected (spreading redness, pus) AND [2] no fever   Negative: [1] No prior tetanus shots (or is not fully vaccinated) AND [2] any burn wound (e g , redness, blister)   Negative: [1] After 10 days AND [2] burn isn't healed   Negative: [1] Minor thermal burn AND [2] last tetanus shot > 10 years ago   Negative: [1] Minor thermal burn of lower leg or foot AND [2] has diabetes (diabetes mellitus)    Answer Assessment - Initial Assessment Questions  1  ONSET: "When did it happen?" If happened <3 hours ago, ask: "Did you apply cold water?" If not, give First Aid Advice immediately  This morning, applied cold water  2  LOCATION: "Where is the burn located?"       Right hand, pointer finger and middle finger  3  BURN SIZE: "How large is the burn?"  The palm is roughly 1% of the total body surface area (BSA)  About 2 inches  4  SEVERITY OF THE BURN: "Are there any blisters?"       No blisters  5  MECHANISM: "Tell me how it happened "      Burned on hot water while making coffee  6  PAIN: "Are you having any pain?" "How bad is the pain?" (Scale 1-10; or mild, moderate, severe)    - MILD (1-3): doesn't interfere with normal activities     - MODERATE (4-7): interferes with normal activities or awakens from sleep     - SEVERE (8-10): excruciating pain, unable to do any normal activities       5  7  INHALATION INJURY: "Were you exposed to any smoke or fumes?" If yes: "Do you have any cough or difficulty breathing?"      *no  8  OTHER SYMPTOMS: "Do you have any other symptoms?" (e g , headache, nausea)     no  9  PREGNANCY: "Is there any chance you are pregnant?" "When was your last menstrual period?"     Yes    BEATRIZ 10/14/20    Protocols used: BURNS - THERMAL-ADULT-

## 2020-09-05 NOTE — TELEPHONE ENCOUNTER
Regarding: burn on fingers/ 34 weeks pregnant  ----- Message from Xavi Gunter sent at 9/5/2020  1:45 PM EDT -----  "I burnt my two fingers this morning   I am 34 weeks pregnant and I want to know what I can take or put on my fingers that won't be bad because I'm pregnant "

## 2020-09-05 NOTE — TELEPHONE ENCOUNTER
Pt states she burned her pointer and middle fingers on her right hand with hot water while making coffee  No blisters noted, just redness  Pt did run fingers in cold water  Pt is UTD with TDAP  Advised pt per protocol

## 2020-09-08 ENCOUNTER — ROUTINE PRENATAL (OUTPATIENT)
Dept: PERINATAL CARE | Facility: CLINIC | Age: 35
End: 2020-09-08
Payer: COMMERCIAL

## 2020-09-08 VITALS
HEIGHT: 60 IN | HEART RATE: 96 BPM | TEMPERATURE: 97.9 F | BODY MASS INDEX: 35.85 KG/M2 | WEIGHT: 182.6 LBS | DIASTOLIC BLOOD PRESSURE: 70 MMHG | SYSTOLIC BLOOD PRESSURE: 104 MMHG

## 2020-09-08 DIAGNOSIS — Z3A.34 34 WEEKS GESTATION OF PREGNANCY: ICD-10-CM

## 2020-09-08 DIAGNOSIS — O24.414 INSULIN CONTROLLED GESTATIONAL DIABETES MELLITUS (GDM) IN THIRD TRIMESTER: Primary | ICD-10-CM

## 2020-09-08 PROCEDURE — 59025 FETAL NON-STRESS TEST: CPT | Performed by: OBSTETRICS & GYNECOLOGY

## 2020-09-08 NOTE — PATIENT INSTRUCTIONS

## 2020-09-08 NOTE — LETTER
September 8, 2020     Brandi Coleman MD  [address]  [address]  [address] [place] [address]    Patient: Stanley Tolbert   YOB: 1985   Date of Visit: 9/8/2020       Dear Dr De Souza Artist: Thank you for referring Stanley Tolbert to me for evaluation  Below are my notes for this consultation  If you have questions, please do not hesitate to call me  I look forward to following your patient along with you  Sincerely,        Chiki Thompson MD        CC: No Recipients  Chiki Thompson MD  9/8/2020  6:42 PM  Sign when Signing Visit  NST is reactive   Chiki Thompson MD

## 2020-09-09 ENCOUNTER — TELEPHONE (OUTPATIENT)
Dept: PERINATAL CARE | Facility: CLINIC | Age: 35
End: 2020-09-09

## 2020-09-09 NOTE — TELEPHONE ENCOUNTER
Spoke with patient and confirmed appointment with MFM  1 support person ( must be over age of 15) may accompany patient  Will you and your support person be able to wear a mask ,without a valve , during entire appointment? yes   To minimize your exposure in our waiting area,check in and rooming questions will be done via phone  When you arrive in the parking lot please call the following inside line # prior to entering office:    [facility] [phone] line: 280 [facility] line:  [address] line: [phone]  [facility] [place] line:  [phone]  [place] line: [phone]    Have you or your support person traveled outside the state in the last 2 weeks?no   If yes, what state did you travel to? no     Do you or your support person have:  Fever or flu- like symptoms? NO  Symptoms of upper respiratory infection like runny nose, sore throat or cough? NO  Do you have new headache that you have not had in the past?NO  Have you experienced any new shortness of breath recently? NO  Do you have any new loss of taste or smell? NO  Do you have any new diarrhea, nausea or vomiting? NO  Have you recently been in contact with anyone who has been sick or diagnosed with COVID-19 infection? NO  Have you been recommended to quarantine because of an exposure to a confirmed positive COVID19 person? NO  You and your support person will have temperature screening upon arrival   Patient verbalized understanding of all instructions

## 2020-09-10 ENCOUNTER — ULTRASOUND (OUTPATIENT)
Dept: PERINATAL CARE | Facility: CLINIC | Age: 35
End: 2020-09-10
Payer: COMMERCIAL

## 2020-09-10 VITALS
WEIGHT: 183.6 LBS | HEART RATE: 93 BPM | TEMPERATURE: 98.1 F | SYSTOLIC BLOOD PRESSURE: 112 MMHG | BODY MASS INDEX: 36.05 KG/M2 | DIASTOLIC BLOOD PRESSURE: 75 MMHG | HEIGHT: 60 IN

## 2020-09-10 DIAGNOSIS — O24.414 INSULIN CONTROLLED GESTATIONAL DIABETES MELLITUS (GDM) IN THIRD TRIMESTER: ICD-10-CM

## 2020-09-10 DIAGNOSIS — Z3A.35 35 WEEKS GESTATION OF PREGNANCY: Primary | ICD-10-CM

## 2020-09-10 DIAGNOSIS — O35.0XX0: ICD-10-CM

## 2020-09-10 PROBLEM — D68.51 FACTOR 5 LEIDEN MUTATION, HETEROZYGOUS (HCC): Status: ACTIVE | Noted: 2020-09-10

## 2020-09-10 PROBLEM — D68.51 FACTOR 5 LEIDEN MUTATION, HETEROZYGOUS (HCC): Status: RESOLVED | Noted: 2020-09-10 | Resolved: 2020-09-10

## 2020-09-10 PROCEDURE — 76815 OB US LIMITED FETUS(S): CPT | Performed by: OBSTETRICS & GYNECOLOGY

## 2020-09-10 PROCEDURE — 59025 FETAL NON-STRESS TEST: CPT | Performed by: OBSTETRICS & GYNECOLOGY

## 2020-09-10 RX ORDER — CYANOCOBALAMIN 1000 UG/ML
1000 INJECTION INTRAMUSCULAR; SUBCUTANEOUS ONCE
Status: CANCELLED | OUTPATIENT
Start: 2020-09-11

## 2020-09-10 RX ORDER — SODIUM CHLORIDE 9 MG/ML
20 INJECTION, SOLUTION INTRAVENOUS ONCE
Status: CANCELLED | OUTPATIENT
Start: 2020-09-11

## 2020-09-10 NOTE — PROGRESS NOTES
[address] [facility]: Ms Lay Mcclelland was seen today at 35w1d for NST (found under the pregnancy episode) which I reviewed the RN assessment and agree, and TRESA (see ultrasound report under OB procedures tab)  See ultrasound report under "OB Procedures" tab    Please don't hesitate to contact our office with any concerns or questions   -Lee Medina MD

## 2020-09-11 ENCOUNTER — HOSPITAL ENCOUNTER (OUTPATIENT)
Dept: INFUSION CENTER | Facility: HOSPITAL | Age: 35
Discharge: HOME | End: 2020-09-11
Attending: INTERNAL MEDICINE
Payer: COMMERCIAL

## 2020-09-11 ENCOUNTER — OFFICE VISIT (OUTPATIENT)
Dept: HEMATOLOGY ONCOLOGY | Facility: CLINIC | Age: 35
End: 2020-09-11
Payer: COMMERCIAL

## 2020-09-11 VITALS
WEIGHT: 182.8 LBS | BODY MASS INDEX: 35.89 KG/M2 | DIASTOLIC BLOOD PRESSURE: 74 MMHG | HEART RATE: 84 BPM | OXYGEN SATURATION: 98 % | RESPIRATION RATE: 18 BRPM | TEMPERATURE: 97.4 F | HEIGHT: 60 IN | SYSTOLIC BLOOD PRESSURE: 122 MMHG

## 2020-09-11 DIAGNOSIS — D50.8 IRON DEFICIENCY ANEMIA SECONDARY TO INADEQUATE DIETARY IRON INTAKE: Primary | ICD-10-CM

## 2020-09-11 DIAGNOSIS — D68.51 HETEROZYGOUS FACTOR V LEIDEN MUTATION (HCC): ICD-10-CM

## 2020-09-11 DIAGNOSIS — Z3A.35 35 WEEKS GESTATION OF PREGNANCY: ICD-10-CM

## 2020-09-11 DIAGNOSIS — D51.8 OTHER VITAMIN B12 DEFICIENCY ANEMIAS: ICD-10-CM

## 2020-09-11 DIAGNOSIS — Z15.89 HETEROZYGOUS FOR MTHFR GENE MUTATION: ICD-10-CM

## 2020-09-11 DIAGNOSIS — Z3A.30 30 WEEKS GESTATION OF PREGNANCY: ICD-10-CM

## 2020-09-11 DIAGNOSIS — O24.414 INSULIN CONTROLLED GESTATIONAL DIABETES MELLITUS (GDM) IN THIRD TRIMESTER: ICD-10-CM

## 2020-09-11 PROCEDURE — 99214 OFFICE O/P EST MOD 30 MIN: CPT | Performed by: NURSE PRACTITIONER

## 2020-09-11 PROCEDURE — 96372 THER/PROPH/DIAG INJ SC/IM: CPT

## 2020-09-11 PROCEDURE — 96365 THER/PROPH/DIAG IV INF INIT: CPT

## 2020-09-11 RX ORDER — CYANOCOBALAMIN 1000 UG/ML
1000 INJECTION INTRAMUSCULAR; SUBCUTANEOUS ONCE
Status: COMPLETED | OUTPATIENT
Start: 2020-09-11 | End: 2020-09-11

## 2020-09-11 RX ORDER — CYANOCOBALAMIN 1000 UG/ML
1000 INJECTION INTRAMUSCULAR; SUBCUTANEOUS ONCE
Status: CANCELLED | OUTPATIENT
Start: 2020-09-11

## 2020-09-11 RX ORDER — INSULIN GLARGINE 100 [IU]/ML
50 INJECTION, SOLUTION SUBCUTANEOUS
Qty: 5 PEN | Refills: 0 | Status: SHIPPED | OUTPATIENT
Start: 2020-09-11 | End: 2020-10-12 | Stop reason: HOSPADM

## 2020-09-11 RX ORDER — SODIUM CHLORIDE 9 MG/ML
20 INJECTION, SOLUTION INTRAVENOUS ONCE
Status: CANCELLED | OUTPATIENT
Start: 2020-09-11

## 2020-09-11 RX ORDER — SODIUM CHLORIDE 9 MG/ML
20 INJECTION, SOLUTION INTRAVENOUS ONCE
Status: COMPLETED | OUTPATIENT
Start: 2020-09-11 | End: 2020-09-11

## 2020-09-11 RX ADMIN — IRON SUCROSE 200 MG: 20 INJECTION, SOLUTION INTRAVENOUS at 14:56

## 2020-09-11 RX ADMIN — SODIUM CHLORIDE 20 ML/HR: 9 INJECTION, SOLUTION INTRAVENOUS at 14:55

## 2020-09-11 RX ADMIN — CYANOCOBALAMIN 1000 MCG: 1000 INJECTION INTRAMUSCULAR; SUBCUTANEOUS at 16:05

## 2020-09-11 NOTE — LETTER
September 11, 2020     Annette Ordonez MD  [address]  [id number]  [place] [address]    Patient: Patito Lindquist   YOB: 1985   Date of Visit: 9/11/2020       Dear Dr Shadia Cole: Thank you for referring Patito Lindquist to me for evaluation  Below are my notes for this consultation  If you have questions, please do not hesitate to call me  I look forward to following your patient along with you  Sincerely,        DAVINA Melissa        CC: No Recipients  DAVINA Melissa  9/11/2020  4:45 PM  Sign when Signing Visit  Hematology/Oncology Outpatient Follow-up  Diana Laureano 29 y o  female 1985 [id number]    Date:  9/11/2020      Assessment and Plan:  1  Iron deficiency anemia secondary to inadequate dietary iron intake  Patient noted to have normocytic anemia hemoglobin 9 8 with iron deficiency ferritin of 5 which is likely due to increased demand of iron in pregnancy  She will be completing her course of IV iron Venofer 200 mg weekly for 5 sessions later today  The patient will follow up again in about 2 months after delivery with repeat labs prior     - CBC and differential; Future  - Comprehensive metabolic panel; Future  - Iron Panel (Includes Ferritin, Iron Sat%, Iron, and TIBC); Future  - Ferritin; Future  - LD,Blood; Future  - Folate; Future    2  Other vitamin B12 deficiency anemias  Patient received 5 vitamin B12 injections recently with each dose of IV iron  We will re-evaluate her vitamin B12 level prior to her next follow-up and make a decision if she needs to continue  - Vitamin B12; Future    3  Heterozygous factor V Leiden mutation [facility])  Patient has strong family history of clotting  No personal history of clotting  She was found to be heterozygous for factor 5 mutation in the past   Does not seem to have antiphospholipid syndrome with normal cardiolipin and beta 2 glycoprotein antibodies      The patient was advised to continue her prophylactic dose of Lovenox 40 mg daily during pregnancy since she has high risk of clotting due to significant family history heterozygous factor 5 mutation  She should stop her Lovenox injections 24 hours prior to her scheduled induction  According to up-to-date the recommendation for pregnant patients who will likely need neuraxial anesthesia is that prophylactic dose of low molecular weight heparin be discontinued at least 12 hours prior to procedure  She will definitely need to continue the prophylactic Lovenox 40 mg for up to 6 weeks postpartum  Plan was discussed with Dr Patsy Mcnally who is in agreement  4  Heterozygous for MTHFR gene mutation  MTHFR mutations have not been found to provide any clinical value regarding clotting risk  HPI:  This is a 15-year-old female [place] has a history of hypothyroidism and history of PCOS  The patient also stated that she had 3 miscarriages within the 1st 10 weeks of pregnancy  She is currently pregnant after IVF which was [place]  She stated that she had to take hormones for the 1st trimester to secure the current pregnancy  The patient also stated that she has significant family history of clotting on her mother side including her grandfather, grandmother and uncle  She had hypercoagulable work[place] which revealed heterozygosity for factor 5 Leiden and 1 mutation of MTHFR  The patient was started on a prophylactic dose of Lovenox 40 mg once a day subcutaneously after the IVF procedure  She is currently pregnant in her 3rd trimester and due for delivery on 10/09/2020  She continues to be on the prophylactic dose of Lovenox 40 mg  Her most recent blood work from 07/18/2020 showed hemoglobin of 9 8 with normal MCV  Her white cells and platelets were within normal range        The patient originally presented to discuss anticoagulation and risk of clotting during current pregnancy      Interval history:  Patient presents today for a follow-up visit  She states that her obstetrics physician recommended that she stop her prophylactic Lovenox 1 month prior to delivery so she stopped taking it on Sunday 09/06/2020  Reports that she is currently giving herself insulin shots due to gestational diabetes  Estimated due date is around the 9th of October but patient states she will likely be induced during week 39 of her pregnancy  She denies any bleeding from any site  Was started on a course of IV iron Venofer and B12 injections weekly for 5 sessions states that her final treatment is today  Reports some improvement in her fatigue with the iron and B12 injections but continues to feel tired likely due to her pregnancy  Patient had additional workup done for us on 07/22/2020 prior to starting her IV iron/B12 which showed normal white cells and platelets, normocytic anemia H&H 9 8/31 1, MCV 85  Albumin decreased 2 6 alk phos elevated 294 remaining metabolic panel normal   Her vitamin B12 was low 247  She was iron deficient with an iron saturation 8%, TIBC 487, serum iron 40 and ferritin 5  Her beta 2 glycoprotein antibodies and cardiolipin antibodies all came back in the normal range ruling out antiphospholipid syndrome  ROS: Review of Systems   Constitutional: Positive for activity change and fatigue  Negative for appetite change, chills, fever and unexpected weight change  HENT: Negative for congestion, mouth sores, nosebleeds, sore throat and trouble swallowing  Eyes: Negative  Respiratory: Negative for cough, chest tightness and shortness of breath  Cardiovascular: Negative for chest pain, palpitations and leg swelling  Gastrointestinal: Positive for abdominal distention  Negative for abdominal pain, blood in stool, constipation, diarrhea, nausea and vomiting  Genitourinary: Negative for difficulty urinating, dysuria, frequency, hematuria and urgency     Musculoskeletal: Positive for arthralgias and myalgias  Negative for back pain, gait problem and joint swelling  Skin: Negative for color change, pallor and rash  Neurological: Positive for numbness (And tingling)  Negative for dizziness, weakness, light-headedness and headaches  Hematological: Negative for adenopathy  Does not bruise/bleed easily  Psychiatric/Behavioral: Positive for sleep disturbance  Negative for dysphoric mood  The patient is not nervous/anxious          Past Medical History:   Diagnosis Date    Disease of thyroid gland     Factor [facility] Le[facility] mutation[facility] )     History of PCOS     MTHFR mutation ([place] [address] )     Pre-diabetes        Past Surgical History:   Procedure Laterality Date    REDUCTION MAMMAPLASTY      WISDOM TOOTH EXTRACTION         Social History     Socioeconomic History    Marital status: /Civil Union     Spouse name: Not on file    Number of children: Not on file    Years of education: Not on file    Highest education level: Not on file   Occupational History    Not on file   Social Needs    Financial resource strain: Not on file    Food insecurity     Worry: Not on file     Inability: Not on file   [employer] needs     Medical: Not on file     Non-medical: Not on file   Tobacco Use    Smoking status: Never Smoker    Smokeless tobacco: Never Used   Substance and Sexual Activity    Alcohol use: No    Drug use: No    Sexual activity: Yes     Partners: Male   Lifestyle    Physical activity     Days per week: Not on file     Minutes per session: Not on file    Stress: Not on file   Relationships    Social connections     Talks on phone: Not on file     Gets together: Not on file     Attends Yarsani service: Not on file     Active member of club or organization: Not on file     Attends meetings of clubs or organizations: Not on file     Relationship status: Not on file    Intimate partner violence     Fear of current or ex partner: Not on file     Emotionally abused: Not on file     Physically abused: Not on file     Forced sexual activity: Not on file   Other Topics Concern    Not on file   Social History Narrative    Uses safety equipment seatbelts       Family History   Problem Relation Age of Onset    Diabetes Mother     Migraines Mother     Polycystic ovary syndrome Mother     Thyroid disease Mother     Thyroid disease Sister     Diabetes Maternal Grandmother     Thyroid disease Maternal Grandmother     Thyroid disease Maternal Aunt     Melanoma Family        No Known Allergies      Current Outpatient Medications:     aspirin (ECOTRIN LOW STRENGTH) 81 mg EC tablet, Take 81 mg by mouth daily, Disp: , Rfl:     Blood Glucose Monitoring Suppl (ONETOUCH VERIO FLEX SYSTEM) w/Device KIT, Test 4 times per day , Disp: 1 kit, Rfl: 0    enoxaparin (LOVENOX) 40 mg/0 4 mL, Inject 0 4 mL under the skin daily at bedtime, Disp: , Rfl:     levothyroxine (Synthroid) 25 mcg tablet, Take 25 mcg by mouth daily , Disp: , Rfl:     OneTouch Delica Lancets 87Y MISC, Test 4 times per day , Disp: 100 each, Rfl: 6    ONETOUCH VERIO test strip, Test 4 times per day , Disp: 100 each, Rfl: 6    Prenatal Vit-Fe Fumarate-FA (PRENATAL VITAMINS PLUS PO), Take 1 tablet by mouth daily, Disp: , Rfl:     insulin glargine (Lantus SoloStar) 100 units/mL injection pen, Inject 50 Units under the skin daily at bedtime, Disp: 5 pen, Rfl: 0    Insulin Pen Needle 31G X 5 MM MISC, Inject under the skin daily at bedtime Use one a day or as directed , Disp: 100 each, Rfl: 1  No current facility-administered medications for this visit       Facility-Administered Medications Ordered in Other Visits:     cyanocobalamin injection 1,000 mcg, 1,000 mcg, Intramuscular, Once, [place] MD      Physical Exam:  /74 (BP Location: Left arm, Patient Position: Sitting)   Pulse 84   Temp (!) 97 4 °F (36 3 °C) (Tympanic)   Resp 18   Ht 5' (1 524 m)   Wt 82 9 kg (182 lb 12 8 oz)   SpO2 98%   BMI 35 70 kg/m²     Physical Exam  Vitals signs reviewed  Constitutional:       General: She is not in acute distress  Appearance: She is well-developed  She is not diaphoretic  HENT:      Head: Normocephalic and atraumatic  Mouth/Throat:      Mouth: Mucous membranes are moist       Pharynx: Oropharynx is clear  No oropharyngeal exudate or posterior oropharyngeal erythema  Eyes:      General: No scleral icterus  Conjunctiva/sclera: Conjunctivae normal       Pupils: Pupils are equal, round, and reactive to light  Neck:      Musculoskeletal: Normal range of motion and neck supple  Thyroid: No thyromegaly  Cardiovascular:      Rate and Rhythm: Normal rate and regular rhythm  Heart sounds: Normal heart sounds  No murmur  Pulmonary:      Effort: Pulmonary effort is normal  No respiratory distress  Breath sounds: Normal breath sounds  Abdominal:      General: There is distension (Due to pregnancy)  Palpations: Abdomen is soft  There is no hepatomegaly or splenomegaly  Tenderness: There is no abdominal tenderness  Musculoskeletal: Normal range of motion  General: No swelling  Lymphadenopathy:      Cervical: No cervical adenopathy  Upper Body:      Right upper body: No axillary adenopathy  Left upper body: No axillary adenopathy  Skin:     General: Skin is warm and dry  Findings: No erythema or rash  Neurological:      General: No focal deficit present  Mental Status: She is alert and oriented to person, place, and time  Psychiatric:         Mood and Affect: Mood normal  Affect is blunt  Behavior: Behavior normal  Behavior is cooperative  Thought Content:  Thought content normal          Judgment: Judgment normal            Labs:  Lab Results   Component Value Date    WBC 8 01 07/22/2020    HGB 9 8 (L) 07/22/2020    HCT 31 1 (L) 07/22/2020    MCV 85 07/22/2020     07/22/2020     Lab Results   Component Value Date    K 4 0 07/22/2020     (H) 07/22/2020    CO2 21 07/22/2020    BUN 4 (L) 07/22/2020    CREATININE 0 50 (L) 07/22/2020    GLUF 98 04/10/2018    CALCIUM 9 0 07/22/2020    AST 14 07/22/2020    ALT 22 07/22/2020    ALKPHOS 294 (H) 07/22/2020    EGFR 127 07/22/2020       Patient voiced understanding and agreement in the above discussion  Aware to contact our office with questions/symptoms in the interim  This note has been generated by voice recognition software system  Therefore, there may be spelling, grammar, and or syntax errors  Please contact if questions arise

## 2020-09-11 NOTE — PROGRESS NOTES
Hematology/Oncology Outpatient Follow-up  Krystle Laureano 29 y o  female 1985 [id number]    Date:  9/11/2020      Assessment and Plan:  1  Iron deficiency anemia secondary to inadequate dietary iron intake  Patient noted to have normocytic anemia hemoglobin 9 8 with iron deficiency ferritin of 5 which is likely due to increased demand of iron in pregnancy  She will be completing her course of IV iron Venofer 200 mg weekly for 5 sessions later today  The patient will follow up again in about 2 months after delivery with repeat labs prior     - CBC and differential; Future  - Comprehensive metabolic panel; Future  - Iron Panel (Includes Ferritin, Iron Sat%, Iron, and TIBC); Future  - Ferritin; Future  - LD,Blood; Future  - Folate; Future    2  Other vitamin B12 deficiency anemias  Patient received 5 vitamin B12 injections recently with each dose of IV iron  We will re-evaluate her vitamin B12 level prior to her next follow-up and make a decision if she needs to continue  - Vitamin B12; Future    3  Heterozygous factor V Leiden mutation [facility])  Patient has strong family history of clotting  No personal history of clotting  She was found to be heterozygous for factor 5 mutation in the past   Does not seem to have antiphospholipid syndrome with normal cardiolipin and beta 2 glycoprotein antibodies  The patient was advised to continue her prophylactic dose of Lovenox 40 mg daily during pregnancy since she has high risk of clotting due to significant family history heterozygous factor 5 mutation  She should stop her Lovenox injections 24 hours prior to her scheduled induction  According to up-to-date the recommendation for pregnant patients who will likely need neuraxial anesthesia is that prophylactic dose of low molecular weight heparin be discontinued at least 12 hours prior to procedure  She will definitely need to continue the prophylactic Lovenox 40 mg for up to 6 weeks postpartum      Plan was discussed with Dr Jeannette Tao who is in agreement  4  Heterozygous for MTHFR gene mutation  MTHFR mutations have not been found to provide any clinical value regarding clotting risk  HPI:  This is a 40-year-old female originally from [place] has a history of hypothyroidism and history of PCOS  The patient also stated that she had 3 miscarriages within the 1st 10 weeks of pregnancy  She is currently pregnant after IVF which was done in [place]  She stated that she had to take hormones for the 1st trimester to secure the current pregnancy  The patient also stated that she has significant family history of clotting on her mother side including her grandfather, grandmother and uncle  She had hypercoagulable workup in [place] ([facility]) which revealed heterozygosity for factor 5 Leiden and 1 mutation of MTHFR  The patient was started on a prophylactic dose of Lovenox 40 mg once a day subcutaneously after the IVF procedure  She is currently pregnant in her 3rd trimester and due for delivery on 10/09/2020  She continues to be on the prophylactic dose of Lovenox 40 mg  Her most recent blood work from 07/18/2020 showed hemoglobin of 9 8 with normal MCV  Her white cells and platelets were within normal range        The patient originally presented to discuss anticoagulation and risk of clotting during current pregnancy  Interval history:  Patient presents today for a follow-up visit  She states that her obstetrics physician recommended that she stop her prophylactic Lovenox 1 month prior to delivery so she stopped taking it on Sunday 09/06/2020  Reports that she is currently giving herself insulin shots due to gestational diabetes  Estimated due date is around the 9th of October but patient states she will likely be induced during week 39 of her pregnancy  She denies any bleeding from any site    Was started on a course of IV iron Venofer and B12 injections weekly for 5 sessions states that her final treatment is today  Reports some improvement in her fatigue with the iron and B12 injections but continues to feel tired likely due to her pregnancy  Patient had additional workup done for us on 07/22/2020 prior to starting her IV iron/B12 which showed normal white cells and platelets, normocytic anemia H&H 9 8/31 1, MCV 85  Albumin decreased 2 6 alk phos elevated 294 remaining metabolic panel normal   Her vitamin B12 was low 247  She was iron deficient with an iron saturation 8%, TIBC 487, serum iron 40 and ferritin 5  Her beta 2 glycoprotein antibodies and cardiolipin antibodies all came back in the normal range ruling out antiphospholipid syndrome  ROS: Review of Systems   Constitutional: Positive for activity change and fatigue  Negative for appetite change, chills, fever and unexpected weight change  HENT: Negative for congestion, mouth sores, nosebleeds, sore throat and trouble swallowing  Eyes: Negative  Respiratory: Negative for cough, chest tightness and shortness of breath  Cardiovascular: Negative for chest pain, palpitations and leg swelling  Gastrointestinal: Positive for abdominal distention  Negative for abdominal pain, blood in stool, constipation, diarrhea, nausea and vomiting  Genitourinary: Negative for difficulty urinating, dysuria, frequency, hematuria and urgency  Musculoskeletal: Positive for arthralgias and myalgias  Negative for back pain, gait problem and joint swelling  Skin: Negative for color change, pallor and rash  Neurological: Positive for numbness (And tingling)  Negative for dizziness, weakness, light-headedness and headaches  Hematological: Negative for adenopathy  Does not bruise/bleed easily  Psychiatric/Behavioral: Positive for sleep disturbance  Negative for dysphoric mood  The patient is not nervous/anxious          Past Medical History:   Diagnosis Date    Disease of thyroid gland     [facility] Le[facility] mutation[facility] ([facility] [facility] )     History of PCOS     MTHFR mutation ([facility] [address] [facility] )     Pre-diabetes        Past Surgical History:   Procedure Laterality Date    REDUCTION MAMMAPLASTY      WISDOM TOOTH EXTRACTION         Social History     Socioeconomic History    Marital status: /Civil Union     Spouse name: Not on file    Number of children: Not on file    Years of education: Not on file    Highest education level: Not on file   Occupational History    Not on file   Social Needs    Financial resource strain: Not on file    Food insecurity     Worry: Not on file     Inability: Not on file   [employer] needs     Medical: Not on file     Non-medical: Not on file   Tobacco Use    Smoking status: Never Smoker    Smokeless tobacco: Never Used   Substance and Sexual Activity    Alcohol use: No    Drug use: No    Sexual activity: Yes     Partners: Male   Lifestyle    Physical activity     Days per week: Not on file     Minutes per session: Not on file    Stress: Not on file   Relationships    Social connections     Talks on phone: Not on file     Gets together: Not on file     Attends [employer] service: Not on file     Active member of club or organization: Not on file     Attends meetings of clubs or organizations: Not on file     Relationship status: Not on file    Intimate partner violence     Fear of current or ex partner: Not on file     Emotionally abused: Not on file     Physically abused: Not on file     Forced sexual activity: Not on file   Other Topics Concern    Not on file   Social History Narrative    Uses safety equipment seatbelts       Family History   Problem Relation Age of Onset    Diabetes Mother     Migraines Mother     Polycystic ovary syndrome Mother     Thyroid disease Mother     Thyroid disease Sister     Diabetes Maternal Grandmother     Thyroid disease Maternal Grandmother     Thyroid disease Maternal Aunt     Melanoma Family        No Known Allergies      Current Outpatient Medications:     aspirin (ECOTRIN LOW STRENGTH) 81 mg EC tablet, Take 81 mg by mouth daily, Disp: , Rfl:     Blood Glucose Monitoring Suppl ([address]) w/Device KIT, Test 4 times per day , Disp: 1 kit, Rfl: 0    enoxaparin (LOVENOX) 40 mg/0 4 mL, Inject 0 4 mL under the skin daily at bedtime, Disp: , Rfl:     levothyroxine (Synthroid) 25 mcg tablet, Take 25 mcg by mouth daily , Disp: , Rfl:     OneTouch Delica Lancets 36L MISC, Test 4 times per day , Disp: 100 each, Rfl: 6    ONETOUCH VERIO test strip, Test 4 times per day , Disp: 100 each, Rfl: 6    Prenatal Vit-Fe Fumarate-FA (PRENATAL VITAMINS PLUS PO), Take 1 tablet by mouth daily, Disp: , Rfl:     insulin glargine (Lantus SoloStar) 100 units/mL injection pen, Inject 50 Units under the skin daily at bedtime, Disp: 5 pen, Rfl: 0    Insulin Pen Needle 31G X 5 MM MISC, Inject under the skin daily at bedtime Use one a day or as directed , Disp: 100 each, Rfl: 1  No current facility-administered medications for this visit  Facility-Administered Medications Ordered in Other Visits:     cyanocobalamin injection 1,000 mcg, 1,000 mcg, Intramuscular, Once, Misty Schafer MD      Physical Exam:  /74 (BP Location: Left arm, Patient Position: Sitting)   Pulse 84   Temp (!) 97 4 °F (36 3 °C) (Tympanic)   Resp 18   Ht 5' (1 524 m)   Wt 82 9 kg (182 lb 12 8 oz)   SpO2 98%   BMI 35 70 kg/m²     Physical Exam  Vitals signs reviewed  Constitutional:       General: She is not in acute distress  Appearance: She is well-developed  She is not diaphoretic  HENT:      Head: Normocephalic and atraumatic  Mouth/Throat:      Mouth: Mucous membranes are moist       Pharynx: Oropharynx is clear  No oropharyngeal exudate or posterior oropharyngeal erythema  Eyes:      General: No scleral icterus  Conjunctiva/sclera: Conjunctivae normal       Pupils: Pupils are equal, round, and reactive to light  Neck:      Musculoskeletal: Normal range of motion and neck supple  Thyroid: No thyromegaly  Cardiovascular:      Rate and Rhythm: Normal rate and regular rhythm  Heart sounds: Normal heart sounds  No murmur  Pulmonary:      Effort: Pulmonary effort is normal  No respiratory distress  Breath sounds: Normal breath sounds  Abdominal:      General: There is distension (Due to pregnancy)  Palpations: Abdomen is soft  There is no hepatomegaly or splenomegaly  Tenderness: There is no abdominal tenderness  Musculoskeletal: Normal range of motion  General: No swelling  Lymphadenopathy:      Cervical: No cervical adenopathy  Upper Body:      Right upper body: No axillary adenopathy  Left upper body: No axillary adenopathy  Skin:     General: Skin is warm and dry  Findings: No erythema or rash  Neurological:      General: No focal deficit present  Mental Status: She is alert and oriented to person, place, and time  Psychiatric:         Mood and Affect: Mood normal  Affect is blunt  Behavior: Behavior normal  Behavior is cooperative  Thought Content: Thought content normal          Judgment: Judgment normal            Labs:  Lab Results   Component Value Date    WBC 8 01 07/22/2020    HGB 9 8 (L) 07/22/2020    HCT 31 1 (L) 07/22/2020    MCV 85 07/22/2020     07/22/2020     Lab Results   Component Value Date    K 4 0 07/22/2020     (H) 07/22/2020    CO2 21 07/22/2020    BUN 4 (L) 07/22/2020    CREATININE 0 50 (L) 07/22/2020    GLUF 98 04/10/2018    CALCIUM 9 0 07/22/2020    AST 14 07/22/2020    ALT 22 07/22/2020    ALKPHOS 294 (H) 07/22/2020    EGFR 127 07/22/2020       Patient voiced understanding and agreement in the above discussion  Aware to contact our office with questions/symptoms in the interim  This note has been generated by voice recognition software system  Therefore, there may be spelling, grammar, and or syntax errors  Please contact if questions arise

## 2020-09-14 ENCOUNTER — NURSE TRIAGE (OUTPATIENT)
Dept: OTHER | Facility: OTHER | Age: 35
End: 2020-09-14

## 2020-09-14 ENCOUNTER — HOSPITAL ENCOUNTER (OUTPATIENT)
Facility: HOSPITAL | Age: 35
Discharge: HOME | End: 2020-09-15
Attending: OBSTETRICS & GYNECOLOGY | Admitting: OBSTETRICS & GYNECOLOGY
Payer: COMMERCIAL

## 2020-09-14 ENCOUNTER — TELEPHONE (OUTPATIENT)
Dept: PERINATAL CARE | Facility: OTHER | Age: 35
End: 2020-09-14

## 2020-09-14 NOTE — TELEPHONE ENCOUNTER
Spoke with patient and confirmed appointment with [facility]  1 support person ( must be over age of 15) may accompany you for your appointment  You and your support person must wear a mask ( [place] Dept of Health)  [facility] does not allow cell phone use, recording device or streaming during the ultrasound  Instructed to call [facility] office prior to entering building  Check in and rooming questions will be done via phone  Inside office # provided:  ?  Charu Brice line: [phone]    ? Do you or your support person currently have:  Fever or flu- like symptoms? NO  Symptoms of upper respiratory infection like runny nose, sore throat or cough? NO  Do you have new headache that you have not had in the past?NO  Have you experienced any new shortness of breath recently? NO  Do you have any new diarrhea, nausea or vomiting? NO  Have you recently been in contact with anyone who has been sick or diagnosed with COVID-19 infection? NO  Have you been recommended to quarantine because of an exposure to a confirmed positive COVID19 person? NO  You and your support person will be screened upon arrival   ?  Patient verbalized understanding of all instructions  YES

## 2020-09-15 ENCOUNTER — TELEPHONE (OUTPATIENT)
Dept: PERINATAL CARE | Facility: OTHER | Age: 35
End: 2020-09-15

## 2020-09-15 VITALS
SYSTOLIC BLOOD PRESSURE: 114 MMHG | TEMPERATURE: 97.1 F | HEART RATE: 97 BPM | DIASTOLIC BLOOD PRESSURE: 63 MMHG | RESPIRATION RATE: 16 BRPM | OXYGEN SATURATION: 98 %

## 2020-09-15 PROCEDURE — 76815 OB US LIMITED FETUS(S): CPT | Performed by: OBSTETRICS & GYNECOLOGY

## 2020-09-15 PROCEDURE — G0463 HOSPITAL OUTPT CLINIC VISIT: HCPCS

## 2020-09-15 PROCEDURE — NC001 PR NO CHARGE: Performed by: OBSTETRICS & GYNECOLOGY

## 2020-09-15 PROCEDURE — 59025 FETAL NON-STRESS TEST: CPT | Performed by: OBSTETRICS & GYNECOLOGY

## 2020-09-15 PROCEDURE — 99214 OFFICE O/P EST MOD 30 MIN: CPT

## 2020-09-15 NOTE — TELEPHONE ENCOUNTER
Spoke with on-call ob/gyn pt to go to [facility] 64 made aware of patient and patient aware of them expecting her  Reason for Disposition   [1] Pregnant 23 or more weeks AND [2] baby moving less today by kick count  (e g , kick count < 5 in 1 hour or < 10 in 2 hours)    Answer Assessment - Initial Assessment Questions  1  FETAL MOVEMENT: "Has the baby's movement decreased or changed significantly from normal?" (e g , yes, no; describe)      Yes belly feels tight  Feels like pushing up the lungs  2  BEATRIZ: "What date are you expecting to deliver?"       Oct 14  3  PREGNANCY: "How many weeks pregnant are you?"       36 weeks  4   OTHER SYMPTOMS: "Do you have any other symptoms?" (e g , abdominal pain, leaking fluid from vagina, vaginal bleeding, etc )      Abdomen feels tight    Protocols used: PREGNANCY - DECREASED FETAL MOVEMENT-ADULT-

## 2020-09-15 NOTE — TELEPHONE ENCOUNTER
I was told by Dr Estrellita Almaguer to cancel her nst & tu appt for today due to pt having that done in the hospital during the  Night,pt was ok with me cancelling her appt today,her next appt with us is on Friday 9/18 in [place]

## 2020-09-15 NOTE — PROGRESS NOTES
Triage Note - OB  Joseph Laureano 29 y o  female MRN: [id number]  Unit/Bed#:  TRIAGE 4 Encounter: [id number]    OB TRIAGE NOTE  Alana Jeronimo  [id number]  9/15/2020  1:21 AM  LD TRIAGE 4/LD TRIAGE 4-*    ASSESS:  29 y o  [id number] 35w6d here for evaluation of decreased fetal movement  NST reactive  TRESA 16/LVP5  Overall reassuring  Patient discharged home  PLAN  1) Decreased fetal movement  NST reactive  TRESA 16 with LVP5cm    2) Discharge instructions  Patient instructed to call if experiencing worsening contractions, vaginal bleeding, loss of fluid or decreased fetal movement  She will follow up with her OBGYN on 20  D/w Dr Baltazar Loges  ______________    SUBJECTIVE    BEATRIZ: Estimated Date of Delivery: 10/14/20    HPI Chronology:  29 y o   35w6d presents with complaint of decreased fetal movement since this evening  States she usually feels baby move with no issues but her has been less active since this evening  Reports increased movement since arrival to triage  Contractions: no  Leakage: no  Bleeding: no  Fetal Movement: less than normal  Pelvic pain: no    Vitals:   /63 (BP Location: Right arm)   Pulse 97   Temp (!) 97 1 °F (36 2 °C) (Tympanic)   Resp 16   SpO2 98%   There is no height or weight on file to calculate BMI  Review of Systems   Constitutional: Negative  Negative for fatigue, fever and unexpected weight change  HENT: Negative  Negative for sore throat  Eyes: Negative  Negative for visual disturbance  Respiratory: Negative for cough and shortness of breath  Cardiovascular: Negative for chest pain  Gastrointestinal: Negative for abdominal pain, nausea and vomiting  Endocrine: Negative for cold intolerance and heat intolerance  Genitourinary: Negative for dysuria, frequency and urgency  Musculoskeletal: Negative  Skin: Negative  Neurological: Negative for light-headedness and headaches     Hematological: Does not bruise/bleed easily  Psychiatric/Behavioral: Negative  Physical Exam  Vitals signs reviewed  Constitutional:       General: She is not in acute distress  Appearance: She is well-developed  She is not diaphoretic  HENT:      Head: Normocephalic and atraumatic  Cardiovascular:      Rate and Rhythm: Normal rate and regular rhythm  Heart sounds: Normal heart sounds  No murmur  No friction rub  No gallop  Pulmonary:      Effort: Pulmonary effort is normal  No respiratory distress  Breath sounds: Normal breath sounds  No stridor  No wheezing or rales  Chest:      Chest wall: No tenderness  Abdominal:      General: Bowel sounds are normal       Palpations: Abdomen is soft  Tenderness: There is no abdominal tenderness  There is no guarding or rebound  Musculoskeletal: Normal range of motion  Skin:     General: Skin is warm and dry  Neurological:      Mental Status: She is alert and oriented to person, place, and time  FHT:  Baseline Rate: 125 bpm  Variability: Moderate 6-25 bpm  Accelerations: 15 x 15 or greater  TOCO:   Contraction Frequency (minutes): occasional  Contraction Duration (seconds): 60  Contraction Quality: Mild    Labs: No results found for this or any previous visit (from the past 24 hour(s))  Lab, Imaging and other studies: I have personally reviewed pertinent reports          Charisse Greene MD  9/15/2020  1:21 AM

## 2020-09-15 NOTE — PROCEDURES
Susana Sharma, a [id number] at 35w6d with an BEATRIZ of 10/14/2020, by Est  Date of Conception, was seen at [address] for the following procedure(s): $Procedure Type: TRESA, NST]    Nonstress Test  Reason for NST: Decreased Fetal Movement  Variability: Moderate  Decelerations: None  Accelerations: Yes  Baseline: 135 BPM  Uterine Irritability: No  Contractions: (rare)    4 Quadrant TRESA  TRESA Q1 (cm): 4 6 cm  TRESA Q2 (cm): 3 9 cm  TRESA Q3 (cm): 2 7 cm  TRESA Q4 (cm): 5 5 cm  TRESA TOTAL (cm): 16 7 cm  LVP (cm): 5 5 cm    Interpretation  Nonstress Test Interpretation: Reactive  Overall Impression: Reassuring  Comments: fetus vertex     Alesia Mcgee MD  PGY-1, OB/GYN  9/15/2020   1:11 AM

## 2020-09-15 NOTE — TELEPHONE ENCOUNTER
Regarding: Baby's Movements are slow  ----- Message from [facility] sent at 9/14/2020 10:23 PM EDT -----  Concern about baby's movements being slower than usual

## 2020-09-16 ENCOUNTER — ROUTINE PRENATAL (OUTPATIENT)
Dept: OBGYN CLINIC | Facility: CLINIC | Age: 35
End: 2020-09-16

## 2020-09-16 VITALS
WEIGHT: 184 LBS | SYSTOLIC BLOOD PRESSURE: 100 MMHG | TEMPERATURE: 98.1 F | DIASTOLIC BLOOD PRESSURE: 60 MMHG | BODY MASS INDEX: 35.94 KG/M2

## 2020-09-16 DIAGNOSIS — Z34.83 PRENATAL CARE, SUBSEQUENT PREGNANCY, THIRD TRIMESTER: Primary | ICD-10-CM

## 2020-09-16 DIAGNOSIS — Z36.85 ANTENATAL SCREENING FOR STREPTOCOCCUS B: ICD-10-CM

## 2020-09-16 PROCEDURE — PNV: Performed by: OBSTETRICS & GYNECOLOGY

## 2020-09-16 PROCEDURE — 87653 STREP B DNA AMP PROBE: CPT | Performed by: OBSTETRICS & GYNECOLOGY

## 2020-09-16 NOTE — PROGRESS NOTES
Positive fetal movement she was seen in triage earlier this week for decreased me to fetal movement this is not problem anymore  She states her blood sugars are doing well on the insulin  She still Lovenox  She has already hematologist recently they suggest we stop the Lovenox 12 hours before induction  Will try to get her induced 38-39 weeks gestation  GBS culture done today

## 2020-09-16 NOTE — PATIENT INSTRUCTIONS
Positive fetal movement, GBS culture done, she is scheduled for fetal echo next week for ventriculomegaly  She will keep me informed her progress return to office in 1 week

## 2020-09-17 ENCOUNTER — TELEPHONE (OUTPATIENT)
Dept: PERINATAL CARE | Facility: CLINIC | Age: 35
End: 2020-09-17

## 2020-09-17 ENCOUNTER — DOCUMENTATION (OUTPATIENT)
Dept: PERINATAL CARE | Facility: CLINIC | Age: 35
End: 2020-09-17

## 2020-09-17 NOTE — TELEPHONE ENCOUNTER
-------------------------------------------------------------    Attempted to reach patient by phone and left voicemail to confirm appointment for MFM ultrasound  1 support person ( must be over the age of 15) may accompany you for your appointment  If you or your support person have traveled outside the state in the past 2 weeks, please call and notify our office today #[phone]  You and your support person must wear a mask ,covering nose and mouth,during your entire visit  You and your support person will have temperature screened upon arrival     To minimize your exposure in our waiting room, please call our office prior to entering the building  Check in and rooming questions will be done via phone  We will give you directions when to enter for your appointment  Inside office # provided:  [place] line:  [phone]    IF you are not feeling well- cough, fever, shortness of breath or any flu like symptoms, contact your primary care physician or [address]    Any questions with these instructions please call Maternal Fetal Medicine nurse line today @ # [phone]

## 2020-09-18 ENCOUNTER — ROUTINE PRENATAL (OUTPATIENT)
Dept: PERINATAL CARE | Facility: CLINIC | Age: 35
End: 2020-09-18
Payer: COMMERCIAL

## 2020-09-18 VITALS
HEIGHT: 60 IN | WEIGHT: 184 LBS | TEMPERATURE: 98 F | DIASTOLIC BLOOD PRESSURE: 78 MMHG | HEART RATE: 108 BPM | BODY MASS INDEX: 36.12 KG/M2 | SYSTOLIC BLOOD PRESSURE: 115 MMHG

## 2020-09-18 DIAGNOSIS — O24.414 INSULIN CONTROLLED GESTATIONAL DIABETES MELLITUS (GDM) IN THIRD TRIMESTER: Primary | ICD-10-CM

## 2020-09-18 LAB — GP B STREP DNA SPEC QL NAA+PROBE: NORMAL

## 2020-09-18 PROCEDURE — 59025 FETAL NON-STRESS TEST: CPT | Performed by: OBSTETRICS & GYNECOLOGY

## 2020-09-18 NOTE — PATIENT INSTRUCTIONS
Thank you for choosing us for your  care today  If you have any questions about your ultrasound or care, please do not hesitate to contact us or your primary obstetrician  Some general instructions for your pregnancy are:     Exercise: Aim for 22 minutes per day (150 minutes per week!) of regular exercise  This is obviously hard to do during a pandemic but walking is great   Nutrition: aim for calcium-rich and iron-rich foods as well as healthy sources of protein  This will help you gain a healthy amount of weight   Protect against the flu: get yourself and your entire household vaccinated against influenza   Protect against coronavirus: practice social distancing, wear a mask in public, and wash your hands often  This virus can be spread easily by people without symptoms  Notify your primary care doctor if you have any symptoms including cough, shortness of breath or difficulty breathing, fever, chills, muscle pain, sore throat, or new loss of taste or smell   Learn about Preeclampsia: preeclampsia is a common, serious complication in pregnancy  A blood pressure of 140mmHg (top number or systolic) OR 85JDBU (bottom number or diastolic) is not normal and needs evaluation by your doctor   If you smoke, try to reduce how many cigarettes you smoke or quit completely  Do not vape   Other warning signs to watch out for in pregnancy or postpartum: chest pain, obstructed breathing or shortness of breath, seizures, thoughts of hurting yourself or your baby, bleeding, a painful or swollen leg, fever, or headache ([facility] POST-BIRTH Warning Signs campaign)  If these happen call 911  Itching is also not normal in pregnancy and if you experience this, especially over your hands and feet, potentially worse at night, notify your doctors

## 2020-09-18 NOTE — PROGRESS NOTES
Repeat Non-Stress Testing:    Pt verbalizes +FM  Pt denies ALL:               Leaking of fluid   Contractions   Vaginal bleeding   Decreased fetal movement    Patient has no questions or concerns  Monitor tracing reviewed with Dr Anthony Chavez prior to completion of appointment

## 2020-09-18 NOTE — PROGRESS NOTES
[address]: Ms Rhina Dyer was seen today at 36w2d for NST (found under the pregnancy episode) which I reviewed the RN assessment and agree  Please don't hesitate to contact our office with any concerns or questions    Senthil Luis MD

## 2020-09-21 ENCOUNTER — TELEPHONE (OUTPATIENT)
Dept: PERINATAL CARE | Facility: CLINIC | Age: 35
End: 2020-09-21

## 2020-09-21 ENCOUNTER — TELEPHONE (OUTPATIENT)
Dept: PERINATAL CARE | Facility: OTHER | Age: 35
End: 2020-09-21

## 2020-09-21 ENCOUNTER — PATIENT MESSAGE (OUTPATIENT)
Dept: PERINATAL CARE | Facility: CLINIC | Age: 35
End: 2020-09-21

## 2020-09-21 DIAGNOSIS — O24.414 INSULIN CONTROLLED GESTATIONAL DIABETES MELLITUS (GDM) IN THIRD TRIMESTER: Primary | ICD-10-CM

## 2020-09-21 NOTE — TELEPHONE ENCOUNTER
Attempted to reach patient by phone and left voicemail to confirm appointment for MFM ultrasound  1 support person ( must be over the age of 15) may accompany you for your appointment  If you or your support person have traveled outside the state in the past 2 weeks, please call and notify our office today #[phone]  You and your support person must wear a mask ,covering nose and mouth,during your entire visit  You and your support person will have temperature screened upon arrival     To minimize your exposure in our waiting room, please call our office prior to entering the building  Check in and rooming questions will be done via phone  We will give you directions when to enter for your appointment  Inside office # provided:  Christina Bustillo line: [phone]      IF you are not feeling well- cough, fever, shortness of breath or any flu like symptoms, contact your primary care physician or [phone]    Any questions with these instructions please call Maternal Fetal Medicine nurse line today @ # [phone]

## 2020-09-21 NOTE — TELEPHONE ENCOUNTER
Attempted to reach patient by phone and left voicemail to confirm appointment for MFM ultrasound  1 support person ( must be over the age of 15) may accompany you for your appointment  If you or your support person have traveled outside the state in the past 2 weeks, please call and notify our office today #[phone]  You and your support person must wear a mask ,covering nose and mouth,during your entire visit  You and your support person will have temperature screened upon arrival     To minimize your exposure in our waiting room, please call our office prior to entering the building  Check in and rooming questions will be done via phone  We will give you directions when to enter for your appointment  Inside office # provided:  Jens Eastman line: [phone]  [facility] line:  [phone]  [facility] line:  [phone] line:  [phone]  Mele Sotelo line:  [phone]  [place] line:  [phone]    IF you are not feeling well- cough, fever, shortness of breath or any flu like symptoms, contact your primary care physician or [phone]    Any questions with these instructions please call Maternal Fetal Medicine nurse line today @ # [phone]

## 2020-09-22 ENCOUNTER — ROUTINE PRENATAL (OUTPATIENT)
Dept: PERINATAL CARE | Facility: OTHER | Age: 35
End: 2020-09-22
Payer: COMMERCIAL

## 2020-09-22 ENCOUNTER — ROUTINE PRENATAL (OUTPATIENT)
Dept: PERINATAL CARE | Facility: CLINIC | Age: 35
End: 2020-09-22
Payer: COMMERCIAL

## 2020-09-22 VITALS
WEIGHT: 185.4 LBS | TEMPERATURE: 98.3 F | HEIGHT: 60 IN | BODY MASS INDEX: 36.4 KG/M2 | SYSTOLIC BLOOD PRESSURE: 107 MMHG | DIASTOLIC BLOOD PRESSURE: 60 MMHG | HEART RATE: 99 BPM

## 2020-09-22 DIAGNOSIS — O24.414 INSULIN CONTROLLED GESTATIONAL DIABETES MELLITUS (GDM) IN THIRD TRIMESTER: ICD-10-CM

## 2020-09-22 DIAGNOSIS — O35.9XX0 FETAL ABNORMALITY AFFECTING MANAGEMENT OF MOTHER, ANTEPARTUM, SINGLE OR UNSPECIFIED FETUS: Primary | ICD-10-CM

## 2020-09-22 DIAGNOSIS — Z3A.36 36 WEEKS GESTATION OF PREGNANCY: Primary | ICD-10-CM

## 2020-09-22 PROCEDURE — 76825 ECHO EXAM OF FETAL HEART: CPT | Performed by: SPECIALIST

## 2020-09-22 PROCEDURE — 76827 ECHO EXAM OF FETAL HEART: CPT | Performed by: SPECIALIST

## 2020-09-22 PROCEDURE — 59025 FETAL NON-STRESS TEST: CPT | Performed by: OBSTETRICS & GYNECOLOGY

## 2020-09-22 PROCEDURE — 93325 DOPPLER ECHO COLOR FLOW MAPG: CPT | Performed by: SPECIALIST

## 2020-09-22 NOTE — PROGRESS NOTES
Repeat Non-Stress Testing:    Pt verbalizes +FM  Pt denies ALL:               Leaking of fluid   Contractions   Vaginal bleeding   Decreased fetal movement    Patient has no questions or concerns  Dr Connie Contreras reviewed monitor tracing prior to completion of appointment

## 2020-09-22 NOTE — PROGRESS NOTES
FETAL ECHOCARDIOGRAPHIC REPORT    Name: Elida Howell  MRN: [id number]  : 1985  Date of study: 2020     Indication: Suspected fetal cardiac anomaly    Findings:  1  The estimated gestational age was 36w7d  2  The fetal heart rate was 149 bpm with normal AV synchrony  3  Situs solitus, D-ventricular looping, normally related great vessels  4  The inferior and superior vena cavae drain appropriately into the right atrium  5  There is a patent foramen ovale shunting from right to left  6  The atrio-ventricular and semilunar valves are unobstructed and have no regurgitation  7  Pulmonary venous return appeared to enter normally into the left atrium  8  The ventricular septum appears to be intact, however this study does not rule out tiny defects  9  The biventricular systolic function is adequate  10  There is an unrestrictive patent ductus arteriosus shunting from right to left  11  There is no evidence of a coarctation of the aorta  12  There is no pericardial effusion  13  Normal cardiac size: cardiac area is 16 cm2 and thoracic area is 45 86 cm2  CT area ratio is 0 37   14  Normal flow patterns in the umbilical vein and artery  Doppler flow findings:  Tricuspid valve: Normal laminar flow with normal flow velocity (49 cm/s) and no insufficiency  Mitral valve: Normal laminar flow with normal flow velocity (72 cm/s) and no insufficiency  Aortic valve: Normal laminar flow with normal flow velocity (105 cm/s) and no insufficiency  Pulmonary valve: Normal laminar flow with normal flow velocity (74 cm/s) and no insufficiency  Ductus arteriosus: Normal laminar flow with normal flow velocity (74 cm/s)  Aortic arch: Normal laminar flow with normal flow velocity (107 cm/s)  Middle cerebral artery: Normal flow pattern with normal flow velocity (70 cm/s) and normal pulsatility index (1 86)    Umbilical artery: Normal flow pattern with normal flow velocity (53 cm/s) and normal pulsatility index (109)  Umbilical vein: Low velocity continuous flow with no pulsatility  Ductus Venosus: Normal flow pattern with no return to baseline or flow reversal     IMPRESSION:   - Overall normal fetal cardiac structure and biventricular function    RECOMMENDATIONS:    - No need for further fetal echocardiograms unless there are new concerns  - Do not recommend empiric echocardiography of the infant following birth unless there are clinical concerns  Please note this study does not rule out atrial septal defects, small VSDs, PAPVR, bicuspid aortic valve, minor obstructive lesions or coarctation of the aorta  Harleen Vigil MD,  [facility]        9/22/2020 9:33 AM   [address] [facility]  [address]   [facility], [address] [place] [address], [facility]       [phone]

## 2020-09-23 ENCOUNTER — TELEPHONE (OUTPATIENT)
Dept: PERINATAL CARE | Facility: OTHER | Age: 35
End: 2020-09-23

## 2020-09-23 NOTE — TELEPHONE ENCOUNTER
Spoke with patient and confirmed appointment with MFM  1 support person (must be over age of 15) may accompany patient  Will you and your support person be able to wear a mask, without a valve, during entire appointment? yes  To minimize your exposure in our waiting area,check in and rooming questions will be done via phone  When you arrive in the parking lot please call the following inside line # prior to entering office:    [place] line:  [phone]    Have you or your support person traveled outside the state in the last 2 weeks? No   If yes, what state did you travel to? n/a     Do you or your support person have:  Fever or flu-like symptoms? No  Symptoms of upper respirator infection like runny nose, sore throat or cough? No  Do you have new headache that you have not had in the past? No  Have you experienced any new shortness of breath recently? No  Do you have any new loss of taste or smell? No  Do you have any new diarrhea, nausea or vomiting? No  Have you recently been in contact with anyone who has been sick or diagnosed with COVID19 infection? No  Have you been recommended to quarantine because of an exposure to a confirmed positive COVID19 person? No    You and your support person will have temperature screening upon arrival   Patient verbalized understanding of all instructions

## 2020-09-24 ENCOUNTER — ROUTINE PRENATAL (OUTPATIENT)
Dept: PERINATAL CARE | Facility: OTHER | Age: 35
End: 2020-09-24
Payer: COMMERCIAL

## 2020-09-24 ENCOUNTER — ULTRASOUND (OUTPATIENT)
Dept: PERINATAL CARE | Facility: OTHER | Age: 35
End: 2020-09-24
Payer: COMMERCIAL

## 2020-09-24 ENCOUNTER — DOCUMENTATION (OUTPATIENT)
Dept: PERINATAL CARE | Facility: CLINIC | Age: 35
End: 2020-09-24

## 2020-09-24 ENCOUNTER — PATIENT MESSAGE (OUTPATIENT)
Dept: PERINATAL CARE | Facility: CLINIC | Age: 35
End: 2020-09-24

## 2020-09-24 ENCOUNTER — APPOINTMENT (OUTPATIENT)
Dept: LAB | Age: 35
End: 2020-09-24
Payer: COMMERCIAL

## 2020-09-24 VITALS
SYSTOLIC BLOOD PRESSURE: 120 MMHG | DIASTOLIC BLOOD PRESSURE: 80 MMHG | HEART RATE: 109 BPM | HEIGHT: 60 IN | BODY MASS INDEX: 36.12 KG/M2 | TEMPERATURE: 98 F | WEIGHT: 184 LBS

## 2020-09-24 DIAGNOSIS — O24.414 INSULIN CONTROLLED GESTATIONAL DIABETES MELLITUS (GDM) IN THIRD TRIMESTER: Primary | ICD-10-CM

## 2020-09-24 DIAGNOSIS — O35.0XX0 PREGNANCY COMPLICATED BY FETAL CEREBRAL VENTRICULOMEGALY, SINGLE OR UNSPECIFIED FETUS: ICD-10-CM

## 2020-09-24 DIAGNOSIS — Z3A.35 35 WEEKS GESTATION OF PREGNANCY: ICD-10-CM

## 2020-09-24 DIAGNOSIS — O09.893 ENCOUNTER FOR SUPERVISION OF HIGH RISK PREGNANCY DUE TO FETAL ANOMALY, THIRD TRIMESTER: ICD-10-CM

## 2020-09-24 DIAGNOSIS — D68.51 FACTOR V LEIDEN (HCC): ICD-10-CM

## 2020-09-24 DIAGNOSIS — Z3A.33 33 WEEKS GESTATION OF PREGNANCY: ICD-10-CM

## 2020-09-24 DIAGNOSIS — O09.813 PREGNANCY RESULTING FROM IN VITRO FERTILIZATION IN THIRD TRIMESTER: ICD-10-CM

## 2020-09-24 DIAGNOSIS — O09.523 MULTIGRAVIDA OF ADVANCED MATERNAL AGE IN THIRD TRIMESTER: ICD-10-CM

## 2020-09-24 PROBLEM — O35.09X0 PREGNANCY COMPLICATED BY FETAL CEREBRAL VENTRICULOMEGALY: Status: ACTIVE | Noted: 2020-08-01

## 2020-09-24 LAB
EST. AVERAGE GLUCOSE BLD GHB EST-MCNC: 111 MG/DL
HBA1C MFR BLD: 5.5 %

## 2020-09-24 PROCEDURE — 59025 FETAL NON-STRESS TEST: CPT | Performed by: OBSTETRICS & GYNECOLOGY

## 2020-09-24 PROCEDURE — NC001 PR NO CHARGE

## 2020-09-24 PROCEDURE — 76816 OB US FOLLOW-UP PER FETUS: CPT | Performed by: OBSTETRICS & GYNECOLOGY

## 2020-09-24 PROCEDURE — 36415 COLL VENOUS BLD VENIPUNCTURE: CPT | Performed by: DIETITIAN, REGISTERED

## 2020-09-24 PROCEDURE — 99212 OFFICE O/P EST SF 10 MIN: CPT | Performed by: OBSTETRICS & GYNECOLOGY

## 2020-09-24 PROCEDURE — 83036 HEMOGLOBIN GLYCOSYLATED A1C: CPT | Performed by: DIETITIAN, REGISTERED

## 2020-09-24 NOTE — PROGRESS NOTES
[address]: Ms Santos Agosto was seen today at 37w1d for NST (found under the pregnancy episode) which I reviewed the RN assessment and agree, and fetal growth ultrasound (see ultrasound report under OB procedures tab)  Please don't hesitate to contact our office with any concerns or questions    Elda Harvey MD

## 2020-09-24 NOTE — PATIENT INSTRUCTIONS
Thank you for choosing us for your  care today  If you have any questions about your ultrasound or care, please do not hesitate to contact us or your primary obstetrician  Some general instructions for your pregnancy are:     Exercise: Aim for 22 minutes per day (150 minutes per week!) of regular exercise  This is obviously hard to do during a pandemic but walking is great   Nutrition: aim for calcium-rich and iron-rich foods as well as healthy sources of protein  This will help you gain a healthy amount of weight   Protect against the flu: get yourself and your entire household vaccinated against influenza   Protect against coronavirus: practice social distancing, wear a mask in public, and wash your hands often  This virus can be spread easily by people without symptoms  Notify your primary care doctor if you have any symptoms including cough, shortness of breath or difficulty breathing, fever, chills, muscle pain, sore throat, or new loss of taste or smell   Learn about Preeclampsia: preeclampsia is a common, serious complication in pregnancy  A blood pressure of 140mmHg (top number or systolic) OR 24ZPVT (bottom number or diastolic) is not normal and needs evaluation by your doctor   If you smoke, try to reduce how many cigarettes you smoke or quit completely  Do not vape   Other warning signs to watch out for in pregnancy or postpartum: chest pain, obstructed breathing or shortness of breath, seizures, thoughts of hurting yourself or your baby, bleeding, a painful or swollen leg, fever, or headache ([facility] POST-BIRTH Warning Signs campaign)  If these happen call 911  Itching is also not normal in pregnancy and if you experience this, especially over your hands and feet, potentially worse at night, notify your doctors

## 2020-09-24 NOTE — PROGRESS NOTES
Date:  20  RE: Sang Simeon    : 1985  Estimated Date of Delivery: 10/14/20  EGA: 37w1d  [facility]  Insulin controlled gestational diabetes        flowsheet     Current regimen:  Lantus --50 units at bedtime (splits into 2 doses of 25 units each)  Patient reported she did not increase to 55 units Lantus as never saw message till yesterday  1800 calorie gestational diabetes meal plan; 3 meals/snacks including balance of carbohydrate, protein and fat with  bedtime snack of 1 carbohydrate with 2 to 3 protein servings  Reported she is very hungry in the middle of the night & is eating a pice of fruit  No more than 8 to 10 hours of fasting from bedtime snack to breakfast meal    SMBG  fasting and 2 hrs after start of meals with a OneTouch Verio meter  Walk 20-30 minutes after dinner if there are no exercise restrictions from OB  Plan:  Lantus- continue 50 units at bedtime  (Split into 2 doses of 25 & 25 units)  No longer needs to increase the bedtime insulin  Always have glucose available to treat hypoglycemia, use 15 by 15 rule  Continue diet and SMBG  Advised her to increase the bedtime snack to 2 CHO servings & 2-3 oz protein so will not be hungry in the middle of the night    20 HbA1c 5 5% normal; has remained stable      20 ultrasound indicates fetal growth and TRESA appeared normal;      Diabetes Self Management Support Plan outside of ongoing care: Spouse/Family    Date due to report next:  Thursday, 10/1/20 or sooner if BG continues outside target ranges     Meaghan Russell, MS, RD, CDE  Diabetes Educator  [facility] Maternal Fetal Medicine  Diabetes and Pregnancy Program

## 2020-09-25 ENCOUNTER — ROUTINE PRENATAL (OUTPATIENT)
Dept: OBGYN CLINIC | Facility: CLINIC | Age: 35
End: 2020-09-25

## 2020-09-25 ENCOUNTER — TELEPHONE (OUTPATIENT)
Dept: PERINATAL CARE | Facility: CLINIC | Age: 35
End: 2020-09-25

## 2020-09-25 VITALS
TEMPERATURE: 98 F | WEIGHT: 184.6 LBS | DIASTOLIC BLOOD PRESSURE: 74 MMHG | SYSTOLIC BLOOD PRESSURE: 120 MMHG | BODY MASS INDEX: 36.05 KG/M2

## 2020-09-25 DIAGNOSIS — Z34.83 PRENATAL CARE, SUBSEQUENT PREGNANCY, THIRD TRIMESTER: Primary | ICD-10-CM

## 2020-09-25 PROCEDURE — PNV: Performed by: OBSTETRICS & GYNECOLOGY

## 2020-09-25 NOTE — PROGRESS NOTES
Maternal-Fetal medicine suggest delivery after 39 weeks gestation  A2 gestational diabetic, currently on Lovenox, increased BMI, GBS negative

## 2020-09-25 NOTE — PATIENT INSTRUCTIONS
Eight 2 gestational diabetic with increased BMI Maternal-Fetal medicine suggest delivery at 39 weeks gestation  The patient is on Lovenox    To continue fetal surveillance

## 2020-09-28 ENCOUNTER — ULTRASOUND (OUTPATIENT)
Dept: PERINATAL CARE | Facility: CLINIC | Age: 35
End: 2020-09-28
Payer: COMMERCIAL

## 2020-09-28 VITALS
WEIGHT: 183.8 LBS | HEIGHT: 60 IN | HEART RATE: 76 BPM | BODY MASS INDEX: 36.08 KG/M2 | DIASTOLIC BLOOD PRESSURE: 60 MMHG | SYSTOLIC BLOOD PRESSURE: 130 MMHG | TEMPERATURE: 97.5 F

## 2020-09-28 DIAGNOSIS — O24.414 INSULIN CONTROLLED GESTATIONAL DIABETES MELLITUS (GDM) IN THIRD TRIMESTER: Primary | ICD-10-CM

## 2020-09-28 DIAGNOSIS — O09.893 ENCOUNTER FOR SUPERVISION OF HIGH RISK PREGNANCY DUE TO FETAL ANOMALY, THIRD TRIMESTER: ICD-10-CM

## 2020-09-28 DIAGNOSIS — Z3A.37 37 WEEKS GESTATION OF PREGNANCY: ICD-10-CM

## 2020-09-28 DIAGNOSIS — D68.51 FACTOR V LEIDEN (HCC): ICD-10-CM

## 2020-09-28 DIAGNOSIS — O09.523 MULTIGRAVIDA OF ADVANCED MATERNAL AGE IN THIRD TRIMESTER: ICD-10-CM

## 2020-09-28 DIAGNOSIS — O35.0XX0 PREGNANCY COMPLICATED BY FETAL CEREBRAL VENTRICULOMEGALY, SINGLE OR UNSPECIFIED FETUS: ICD-10-CM

## 2020-09-28 DIAGNOSIS — O09.813 PREGNANCY RESULTING FROM IN VITRO FERTILIZATION IN THIRD TRIMESTER: ICD-10-CM

## 2020-09-28 PROCEDURE — 76815 OB US LIMITED FETUS(S): CPT | Performed by: OBSTETRICS & GYNECOLOGY

## 2020-09-28 PROCEDURE — 59025 FETAL NON-STRESS TEST: CPT | Performed by: OBSTETRICS & GYNECOLOGY

## 2020-09-28 NOTE — PROGRESS NOTES
Fetal ultrasound examination for evaluation of TRESA and NST at  37 5 /7 weeks gestation  Hx of    AMA, IVF, Obesity, ventriculomegaly, A2GDM  She is asymptomatic  See Ob procedures in EPIC  1  TRESA  12 4     2  NST  Reactive      Recommendations; 1  Twice a week NSTs, once a week TRESA  2  Daily fetal movement counts  Thank you for referring your patient to our offices  If you have any further questions do not hesitate to contact us as [phone]      Iggy De Jesus MD

## 2020-09-30 ENCOUNTER — ROUTINE PRENATAL (OUTPATIENT)
Dept: OBGYN CLINIC | Facility: CLINIC | Age: 35
End: 2020-09-30

## 2020-09-30 ENCOUNTER — TELEPHONE (OUTPATIENT)
Dept: PERINATAL CARE | Facility: CLINIC | Age: 35
End: 2020-09-30

## 2020-09-30 VITALS
TEMPERATURE: 97.9 F | DIASTOLIC BLOOD PRESSURE: 60 MMHG | SYSTOLIC BLOOD PRESSURE: 116 MMHG | WEIGHT: 185.2 LBS | BODY MASS INDEX: 36.17 KG/M2

## 2020-09-30 DIAGNOSIS — Z34.83 PRENATAL CARE, SUBSEQUENT PREGNANCY, THIRD TRIMESTER: Primary | ICD-10-CM

## 2020-09-30 PROCEDURE — PNV: Performed by: OBSTETRICS & GYNECOLOGY

## 2020-09-30 NOTE — TELEPHONE ENCOUNTER
Spoke with patient and confirmed appointment with MFM  1 support person ( must be over age of 15) may accompany patient  Will you and your support person be able to wear a mask ,without a valve , during entire appointment? YES    To minimize your exposure in our waiting area,check in and rooming questions will be done via phone  When you arrive in the parking lot please call the following inside line # prior to entering office:      [place] line: [phone]    Have you or your support person traveled outside the state in the last 2 weeks? NO   If yes, what state did you travel to? Do you or your support person have:  Fever or flu- like symptoms? NO   Symptoms of upper respiratory infection like runny nose, sore throat or cough? NO Do you have new headache that you have not had in the past? NO   Have you experienced any new shortness of breath recently? NO   Do you have any new loss of taste or smell? NO   Do you have any new diarrhea, nausea or vomiting? NO   Have you recently been in contact with anyone who has been sick or diagnosed with COVID-19 infection? NO   Have you been recommended to quarantine because of an exposure to a confirmed positive COVID19 person? NO   You and your support person will have temperature screening upon arrival   Patient verbalized understanding of all instructions

## 2020-09-30 NOTE — PATIENT INSTRUCTIONS
We have arrange for induction for A2 diabetic on the evening of October 8th with Cytotec and Palacios balloon with Pitocin the following day the 9th of October

## 2020-09-30 NOTE — PROGRESS NOTES
Past for Maternal-Fetal Medicine suggestion the patient has been set up for induction on the  at 39 weeks and 1 day  She will stop her Lovenox on the evening of the   She will need cervical ripening with Cytotec and Palacios balloon  Will do Pitocin the following day  Am concerned about increased risk for  section because of her body habitus  Patient is aware this  Return to office in 1 week

## 2020-10-01 ENCOUNTER — ROUTINE PRENATAL (OUTPATIENT)
Dept: PERINATAL CARE | Facility: CLINIC | Age: 35
End: 2020-10-01
Payer: COMMERCIAL

## 2020-10-01 VITALS
WEIGHT: 186 LBS | HEART RATE: 87 BPM | HEIGHT: 60 IN | TEMPERATURE: 97.8 F | DIASTOLIC BLOOD PRESSURE: 64 MMHG | BODY MASS INDEX: 36.52 KG/M2 | SYSTOLIC BLOOD PRESSURE: 123 MMHG

## 2020-10-01 DIAGNOSIS — O24.414 INSULIN CONTROLLED GESTATIONAL DIABETES MELLITUS (GDM) IN THIRD TRIMESTER: Primary | ICD-10-CM

## 2020-10-01 DIAGNOSIS — Z3A.38 38 WEEKS GESTATION OF PREGNANCY: ICD-10-CM

## 2020-10-01 PROCEDURE — 59025 FETAL NON-STRESS TEST: CPT | Performed by: OBSTETRICS & GYNECOLOGY

## 2020-10-02 ENCOUNTER — TELEPHONE (OUTPATIENT)
Dept: PERINATAL CARE | Facility: CLINIC | Age: 35
End: 2020-10-02

## 2020-10-05 ENCOUNTER — ULTRASOUND (OUTPATIENT)
Dept: PERINATAL CARE | Facility: CLINIC | Age: 35
End: 2020-10-05
Payer: COMMERCIAL

## 2020-10-05 VITALS
BODY MASS INDEX: 36.4 KG/M2 | HEART RATE: 83 BPM | SYSTOLIC BLOOD PRESSURE: 114 MMHG | WEIGHT: 185.4 LBS | HEIGHT: 60 IN | DIASTOLIC BLOOD PRESSURE: 89 MMHG | TEMPERATURE: 97.7 F

## 2020-10-05 DIAGNOSIS — Z3A.38 38 WEEKS GESTATION OF PREGNANCY: ICD-10-CM

## 2020-10-05 DIAGNOSIS — O09.813 PREGNANCY RESULTING FROM IN VITRO FERTILIZATION IN THIRD TRIMESTER: Primary | ICD-10-CM

## 2020-10-05 DIAGNOSIS — O24.414 INSULIN CONTROLLED GESTATIONAL DIABETES MELLITUS (GDM) IN THIRD TRIMESTER: ICD-10-CM

## 2020-10-05 PROCEDURE — 76815 OB US LIMITED FETUS(S): CPT | Performed by: OBSTETRICS & GYNECOLOGY

## 2020-10-05 PROCEDURE — 59025 FETAL NON-STRESS TEST: CPT | Performed by: OBSTETRICS & GYNECOLOGY

## 2020-10-07 ENCOUNTER — ROUTINE PRENATAL (OUTPATIENT)
Dept: OBGYN CLINIC | Facility: CLINIC | Age: 35
End: 2020-10-07

## 2020-10-07 VITALS
BODY MASS INDEX: 36.25 KG/M2 | WEIGHT: 185.6 LBS | DIASTOLIC BLOOD PRESSURE: 72 MMHG | TEMPERATURE: 97.9 F | SYSTOLIC BLOOD PRESSURE: 122 MMHG

## 2020-10-07 DIAGNOSIS — Z34.83 PRENATAL CARE, SUBSEQUENT PREGNANCY, THIRD TRIMESTER: Primary | ICD-10-CM

## 2020-10-07 PROCEDURE — PNV: Performed by: OBSTETRICS & GYNECOLOGY

## 2020-10-08 ENCOUNTER — HOSPITAL ENCOUNTER (OUTPATIENT)
Dept: LABOR AND DELIVERY | Facility: HOSPITAL | Age: 35
Discharge: HOME | End: 2020-10-08
Payer: COMMERCIAL

## 2020-10-08 ENCOUNTER — ANESTHESIA EVENT (INPATIENT)
Dept: LABOR AND DELIVERY | Facility: HOSPITAL | Age: 35
End: 2020-10-08
Payer: COMMERCIAL

## 2020-10-08 ENCOUNTER — HOSPITAL ENCOUNTER (INPATIENT)
Facility: HOSPITAL | Age: 35
LOS: 4 days | Discharge: HOME | End: 2020-10-12
Attending: OBSTETRICS & GYNECOLOGY | Admitting: OBSTETRICS & GYNECOLOGY
Payer: COMMERCIAL

## 2020-10-08 ENCOUNTER — ANESTHESIA (INPATIENT)
Dept: LABOR AND DELIVERY | Facility: HOSPITAL | Age: 35
End: 2020-10-08
Payer: COMMERCIAL

## 2020-10-08 DIAGNOSIS — Z98.891 STATUS POST PRIMARY LOW TRANSVERSE CESAREAN SECTION: Primary | ICD-10-CM

## 2020-10-08 PROBLEM — Z3A.39 39 WEEKS GESTATION OF PREGNANCY: Status: ACTIVE | Noted: 2020-10-08

## 2020-10-08 LAB
ERYTHROCYTE [DISTWIDTH] IN BLOOD BY AUTOMATED COUNT: 16.9 % (ref 11.6–15.1)
GLUCOSE SERPL-MCNC: 84 MG/DL (ref 65–140)
HCT VFR BLD AUTO: 35.5 % (ref 34.8–46.1)
HGB BLD-MCNC: 11.5 G/DL (ref 11.5–15.4)
HOLD SPECIMEN: NORMAL
MCH RBC QN AUTO: 27.3 PG (ref 26.8–34.3)
MCHC RBC AUTO-ENTMCNC: 32.4 G/DL (ref 31.4–37.4)
MCV RBC AUTO: 84 FL (ref 82–98)
PLATELET # BLD AUTO: 223 THOUSANDS/UL (ref 149–390)
PMV BLD AUTO: 10.5 FL (ref 8.9–12.7)
RBC # BLD AUTO: 4.21 MILLION/UL (ref 3.81–5.12)
WBC # BLD AUTO: 7.59 THOUSAND/UL (ref 4.31–10.16)

## 2020-10-08 PROCEDURE — 85027 COMPLETE CBC AUTOMATED: CPT | Performed by: STUDENT IN AN ORGANIZED HEALTH CARE EDUCATION/TRAINING PROGRAM

## 2020-10-08 PROCEDURE — 82948 REAGENT STRIP/BLOOD GLUCOSE: CPT

## 2020-10-08 PROCEDURE — 86592 SYPHILIS TEST NON-TREP QUAL: CPT | Performed by: STUDENT IN AN ORGANIZED HEALTH CARE EDUCATION/TRAINING PROGRAM

## 2020-10-08 PROCEDURE — 4A1HXCZ MONITORING OF PRODUCTS OF CONCEPTION, CARDIAC RATE, EXTERNAL APPROACH: ICD-10-PCS | Performed by: OBSTETRICS & GYNECOLOGY

## 2020-10-08 PROCEDURE — 86901 BLOOD TYPING SEROLOGIC RH(D): CPT | Performed by: STUDENT IN AN ORGANIZED HEALTH CARE EDUCATION/TRAINING PROGRAM

## 2020-10-08 PROCEDURE — 86900 BLOOD TYPING SEROLOGIC ABO: CPT | Performed by: STUDENT IN AN ORGANIZED HEALTH CARE EDUCATION/TRAINING PROGRAM

## 2020-10-08 PROCEDURE — 3E0P7VZ INTRODUCTION OF HORMONE INTO FEMALE REPRODUCTIVE, VIA NATURAL OR ARTIFICIAL OPENING: ICD-10-PCS | Performed by: OBSTETRICS & GYNECOLOGY

## 2020-10-08 PROCEDURE — 86850 RBC ANTIBODY SCREEN: CPT | Performed by: STUDENT IN AN ORGANIZED HEALTH CARE EDUCATION/TRAINING PROGRAM

## 2020-10-08 PROCEDURE — 99024 POSTOP FOLLOW-UP VISIT: CPT | Performed by: OBSTETRICS & GYNECOLOGY

## 2020-10-08 RX ORDER — SODIUM CHLORIDE 9 MG/ML
125 INJECTION, SOLUTION INTRAVENOUS CONTINUOUS
Status: DISCONTINUED | OUTPATIENT
Start: 2020-10-08 | End: 2020-10-09

## 2020-10-08 RX ORDER — CALCIUM CARBONATE 200(500)MG
1000 TABLET,CHEWABLE ORAL DAILY PRN
Status: DISCONTINUED | OUTPATIENT
Start: 2020-10-08 | End: 2020-10-10

## 2020-10-08 RX ORDER — ACETAMINOPHEN 325 MG/1
650 TABLET ORAL EVERY 4 HOURS PRN
Status: DISCONTINUED | OUTPATIENT
Start: 2020-10-08 | End: 2020-10-10

## 2020-10-08 RX ORDER — ONDANSETRON 2 MG/ML
4 INJECTION INTRAMUSCULAR; INTRAVENOUS EVERY 8 HOURS PRN
Status: DISCONTINUED | OUTPATIENT
Start: 2020-10-08 | End: 2020-10-10

## 2020-10-08 RX ORDER — INSULIN GLARGINE 100 [IU]/ML
25 INJECTION, SOLUTION SUBCUTANEOUS
Status: DISCONTINUED | OUTPATIENT
Start: 2020-10-08 | End: 2020-10-10

## 2020-10-08 RX ORDER — LEVOTHYROXINE SODIUM 0.03 MG/1
25 TABLET ORAL
Status: DISCONTINUED | OUTPATIENT
Start: 2020-10-09 | End: 2020-10-10

## 2020-10-08 RX ADMIN — INSULIN GLARGINE 25 UNITS: 100 INJECTION, SOLUTION SUBCUTANEOUS at 23:30

## 2020-10-08 RX ADMIN — MISOPROSTOL 25 MCG: 100 TABLET ORAL at 23:43

## 2020-10-08 RX ADMIN — SODIUM CHLORIDE 125 ML/HR: 0.9 INJECTION, SOLUTION INTRAVENOUS at 23:36

## 2020-10-09 VITALS — HEART RATE: 84 BPM

## 2020-10-09 PROBLEM — Z98.891 STATUS POST PRIMARY LOW TRANSVERSE CESAREAN SECTION: Status: ACTIVE | Noted: 2020-10-09

## 2020-10-09 LAB
ABO GROUP BLD: NORMAL
BASE EXCESS BLDCOA CALC-SCNC: -6 MMOL/L (ref 3–11)
BASE EXCESS BLDCOV CALC-SCNC: -7 MMOL/L (ref 1–9)
BLD GP AB SCN SERPL QL: NEGATIVE
GLUCOSE SERPL-MCNC: 105 MG/DL (ref 65–140)
GLUCOSE SERPL-MCNC: 110 MG/DL (ref 65–140)
GLUCOSE SERPL-MCNC: 62 MG/DL (ref 65–140)
GLUCOSE SERPL-MCNC: 63 MG/DL (ref 65–140)
GLUCOSE SERPL-MCNC: 68 MG/DL (ref 65–140)
GLUCOSE SERPL-MCNC: 69 MG/DL (ref 65–140)
GLUCOSE SERPL-MCNC: 71 MG/DL (ref 65–140)
GLUCOSE SERPL-MCNC: 74 MG/DL (ref 65–140)
GLUCOSE SERPL-MCNC: 74 MG/DL (ref 65–140)
GLUCOSE SERPL-MCNC: 77 MG/DL (ref 65–140)
GLUCOSE SERPL-MCNC: 80 MG/DL (ref 65–140)
GLUCOSE SERPL-MCNC: 87 MG/DL (ref 65–140)
GLUCOSE SERPL-MCNC: 94 MG/DL (ref 65–140)
GLUCOSE SERPL-MCNC: 94 MG/DL (ref 65–140)
HCO3 BLDCOA-SCNC: 22 MMOL/L (ref 17.3–27.3)
HCO3 BLDCOV-SCNC: 20.5 MMOL/L (ref 12.2–28.6)
OXYHGB MFR BLDCOA: 14 %
OXYHGB MFR BLDCOV: 10 %
PCO2 BLDCOA: 53.7 MM[HG] (ref 30–60)
PCO2 BLDCOV: 48.9 MM HG (ref 27–43)
PH BLDCOA: 7.23 [PH] (ref 7.23–7.43)
PH BLDCOV: 7.24 [PH] (ref 7.19–7.49)
PO2 BLDCOA: <10 MM HG (ref 5–25)
PO2 BLDCOV: 10.4 MM HG (ref 15–45)
RH BLD: POSITIVE
RPR SER QL: NORMAL
SAO2 % BLDCOV: 2 ML/DL
SPECIMEN EXPIRATION DATE: NORMAL

## 2020-10-09 PROCEDURE — 82805 BLOOD GASES W/O2 SATURATION: CPT | Performed by: OBSTETRICS & GYNECOLOGY

## 2020-10-09 PROCEDURE — 99024 POSTOP FOLLOW-UP VISIT: CPT | Performed by: OBSTETRICS & GYNECOLOGY

## 2020-10-09 PROCEDURE — 59510 CESAREAN DELIVERY: CPT | Performed by: OBSTETRICS & GYNECOLOGY

## 2020-10-09 PROCEDURE — NC001 PR NO CHARGE: Performed by: OBSTETRICS & GYNECOLOGY

## 2020-10-09 PROCEDURE — 82948 REAGENT STRIP/BLOOD GLUCOSE: CPT

## 2020-10-09 RX ORDER — EPHEDRINE SULFATE 50 MG/ML
INJECTION INTRAVENOUS AS NEEDED
Status: DISCONTINUED | OUTPATIENT
Start: 2020-10-09 | End: 2020-10-09

## 2020-10-09 RX ORDER — LIDOCAINE HYDROCHLORIDE AND EPINEPHRINE 15; 5 MG/ML; UG/ML
INJECTION, SOLUTION EPIDURAL
Status: COMPLETED | OUTPATIENT
Start: 2020-10-09 | End: 2020-10-09

## 2020-10-09 RX ORDER — KETOROLAC TROMETHAMINE 30 MG/ML
INJECTION, SOLUTION INTRAMUSCULAR; INTRAVENOUS AS NEEDED
Status: DISCONTINUED | OUTPATIENT
Start: 2020-10-09 | End: 2020-10-09

## 2020-10-09 RX ORDER — OXYTOCIN/RINGER'S LACTATE 30/500 ML
PLASTIC BAG, INJECTION (ML) INTRAVENOUS
Status: COMPLETED
Start: 2020-10-09 | End: 2020-10-09

## 2020-10-09 RX ORDER — HYDROMORPHONE HCL/PF 1 MG/ML
0.2 SYRINGE (ML) INJECTION
Status: DISCONTINUED | OUTPATIENT
Start: 2020-10-09 | End: 2020-10-10

## 2020-10-09 RX ORDER — SODIUM CHLORIDE 9 MG/ML
125 INJECTION, SOLUTION INTRAVENOUS CONTINUOUS
Status: DISCONTINUED | OUTPATIENT
Start: 2020-10-09 | End: 2020-10-10

## 2020-10-09 RX ORDER — OXYTOCIN/RINGER'S LACTATE 30/500 ML
1-30 PLASTIC BAG, INJECTION (ML) INTRAVENOUS
Status: DISCONTINUED | OUTPATIENT
Start: 2020-10-09 | End: 2020-10-10

## 2020-10-09 RX ORDER — DEXTROSE AND SODIUM CHLORIDE 5; .9 G/100ML; G/100ML
100 INJECTION, SOLUTION INTRAVENOUS CONTINUOUS
Status: DISCONTINUED | OUTPATIENT
Start: 2020-10-09 | End: 2020-10-09

## 2020-10-09 RX ORDER — BUTORPHANOL TARTRATE 1 MG/ML
1 INJECTION, SOLUTION INTRAMUSCULAR; INTRAVENOUS ONCE
Status: COMPLETED | OUTPATIENT
Start: 2020-10-09 | End: 2020-10-09

## 2020-10-09 RX ORDER — CEFAZOLIN SODIUM 2 G/50ML
2000 SOLUTION INTRAVENOUS ONCE
Status: COMPLETED | OUTPATIENT
Start: 2020-10-09 | End: 2020-10-09

## 2020-10-09 RX ORDER — DEXTROSE MONOHYDRATE 25 G/50ML
INJECTION, SOLUTION INTRAVENOUS AS NEEDED
Status: DISCONTINUED | OUTPATIENT
Start: 2020-10-09 | End: 2020-10-09

## 2020-10-09 RX ORDER — MORPHINE SULFATE 10 MG/ML
INJECTION, SOLUTION INTRAMUSCULAR; INTRAVENOUS AS NEEDED
Status: DISCONTINUED | OUTPATIENT
Start: 2020-10-09 | End: 2020-10-09

## 2020-10-09 RX ORDER — CARBOPROST TROMETHAMINE 250 UG/ML
INJECTION, SOLUTION INTRAMUSCULAR AS NEEDED
Status: DISCONTINUED | OUTPATIENT
Start: 2020-10-09 | End: 2020-10-09

## 2020-10-09 RX ORDER — LIDOCAINE HYDROCHLORIDE AND EPINEPHRINE 20; 5 MG/ML; UG/ML
INJECTION, SOLUTION EPIDURAL; INFILTRATION; INTRACAUDAL; PERINEURAL AS NEEDED
Status: DISCONTINUED | OUTPATIENT
Start: 2020-10-09 | End: 2020-10-09

## 2020-10-09 RX ORDER — ONDANSETRON 2 MG/ML
INJECTION INTRAMUSCULAR; INTRAVENOUS AS NEEDED
Status: DISCONTINUED | OUTPATIENT
Start: 2020-10-09 | End: 2020-10-09

## 2020-10-09 RX ORDER — DEXAMETHASONE SODIUM PHOSPHATE 4 MG/ML
INJECTION, SOLUTION INTRA-ARTICULAR; INTRALESIONAL; INTRAMUSCULAR; INTRAVENOUS; SOFT TISSUE AS NEEDED
Status: DISCONTINUED | OUTPATIENT
Start: 2020-10-09 | End: 2020-10-09

## 2020-10-09 RX ORDER — SODIUM CHLORIDE, SODIUM LACTATE, POTASSIUM CHLORIDE, CALCIUM CHLORIDE 600; 310; 30; 20 MG/100ML; MG/100ML; MG/100ML; MG/100ML
INJECTION, SOLUTION INTRAVENOUS CONTINUOUS PRN
Status: DISCONTINUED | OUTPATIENT
Start: 2020-10-09 | End: 2020-10-09

## 2020-10-09 RX ORDER — FENTANYL CITRATE 50 UG/ML
INJECTION, SOLUTION INTRAMUSCULAR; INTRAVENOUS AS NEEDED
Status: DISCONTINUED | OUTPATIENT
Start: 2020-10-09 | End: 2020-10-09

## 2020-10-09 RX ORDER — METOCLOPRAMIDE HYDROCHLORIDE 5 MG/ML
INJECTION INTRAMUSCULAR; INTRAVENOUS AS NEEDED
Status: DISCONTINUED | OUTPATIENT
Start: 2020-10-09 | End: 2020-10-09

## 2020-10-09 RX ORDER — LOPERAMIDE HYDROCHLORIDE 2 MG/1
2 CAPSULE ORAL 3 TIMES DAILY PRN
Status: DISCONTINUED | OUTPATIENT
Start: 2020-10-09 | End: 2020-10-12 | Stop reason: HOSPADM

## 2020-10-09 RX ORDER — FENTANYL CITRATE/PF 50 MCG/ML
25 SYRINGE (ML) INJECTION
Status: DISCONTINUED | OUTPATIENT
Start: 2020-10-09 | End: 2020-10-10

## 2020-10-09 RX ORDER — ROPIVACAINE HYDROCHLORIDE 2 MG/ML
INJECTION, SOLUTION EPIDURAL; INFILTRATION; PERINEURAL AS NEEDED
Status: DISCONTINUED | OUTPATIENT
Start: 2020-10-09 | End: 2020-10-09

## 2020-10-09 RX ORDER — OXYTOCIN/RINGER'S LACTATE 30/500 ML
PLASTIC BAG, INJECTION (ML) INTRAVENOUS CONTINUOUS PRN
Status: DISCONTINUED | OUTPATIENT
Start: 2020-10-09 | End: 2020-10-09

## 2020-10-09 RX ADMIN — SODIUM CHLORIDE 125 ML/HR: 0.9 INJECTION, SOLUTION INTRAVENOUS at 10:59

## 2020-10-09 RX ADMIN — DEXAMETHASONE SODIUM PHOSPHATE 4 MG: 4 INJECTION, SOLUTION INTRAMUSCULAR; INTRAVENOUS at 22:05

## 2020-10-09 RX ADMIN — ONDANSETRON 4 MG: 2 INJECTION INTRAMUSCULAR; INTRAVENOUS at 22:05

## 2020-10-09 RX ADMIN — MORPHINE SULFATE 6 MG: 10 INJECTION, SOLUTION INTRAMUSCULAR; INTRAVENOUS at 23:02

## 2020-10-09 RX ADMIN — SODIUM CHLORIDE 125 ML/HR: 0.9 INJECTION, SOLUTION INTRAVENOUS at 14:27

## 2020-10-09 RX ADMIN — LIDOCAINE HYDROCHLORIDE AND EPINEPHRINE 5 ML: 20; 5 INJECTION, SOLUTION EPIDURAL; INFILTRATION; INTRACAUDAL; PERINEURAL at 22:55

## 2020-10-09 RX ADMIN — Medication 450 MILLI-UNITS/MIN: at 22:24

## 2020-10-09 RX ADMIN — EPHEDRINE SULFATE 10 MG: 50 INJECTION, SOLUTION INTRAVENOUS at 14:28

## 2020-10-09 RX ADMIN — LIDOCAINE HYDROCHLORIDE AND EPINEPHRINE 5 ML: 20; 5 INJECTION, SOLUTION EPIDURAL; INFILTRATION; INTRACAUDAL; PERINEURAL at 21:55

## 2020-10-09 RX ADMIN — CARBOPROST TROMETHAMINE 250 MCG: 250 INJECTION, SOLUTION INTRAMUSCULAR at 22:41

## 2020-10-09 RX ADMIN — Medication 2 MILLI-UNITS/MIN: at 09:41

## 2020-10-09 RX ADMIN — DEXTROSE MONOHYDRATE 6.25 G: 500 INJECTION PARENTERAL at 22:19

## 2020-10-09 RX ADMIN — ROPIVACAINE HYDROCHLORIDE: 2 INJECTION, SOLUTION EPIDURAL; INFILTRATION at 13:29

## 2020-10-09 RX ADMIN — ROPIVACAINE HYDROCHLORIDE: 2 INJECTION, SOLUTION EPIDURAL; INFILTRATION at 20:56

## 2020-10-09 RX ADMIN — LIDOCAINE HYDROCHLORIDE AND EPINEPHRINE 3 ML: 15; 5 INJECTION, SOLUTION EPIDURAL at 13:15

## 2020-10-09 RX ADMIN — MISOPROSTOL 50 MCG: 100 TABLET ORAL at 04:04

## 2020-10-09 RX ADMIN — LEVOTHYROXINE SODIUM 25 MCG: 25 TABLET ORAL at 06:55

## 2020-10-09 RX ADMIN — PHENYLEPHRINE HYDROCHLORIDE 50 MCG/MIN: 10 INJECTION INTRAVENOUS at 22:02

## 2020-10-09 RX ADMIN — Medication 500 MG: at 22:05

## 2020-10-09 RX ADMIN — PHENYLEPHRINE HYDROCHLORIDE 200 MCG: 10 INJECTION INTRAVENOUS at 22:48

## 2020-10-09 RX ADMIN — SODIUM CHLORIDE 999 ML/HR: 0.9 INJECTION, SOLUTION INTRAVENOUS at 13:15

## 2020-10-09 RX ADMIN — SODIUM CHLORIDE, SODIUM LACTATE, POTASSIUM CHLORIDE, AND CALCIUM CHLORIDE: .6; .31; .03; .02 INJECTION, SOLUTION INTRAVENOUS at 22:10

## 2020-10-09 RX ADMIN — PHENYLEPHRINE HYDROCHLORIDE 200 MCG: 10 INJECTION INTRAVENOUS at 22:38

## 2020-10-09 RX ADMIN — SODIUM CHLORIDE 125 ML/HR: 0.9 INJECTION, SOLUTION INTRAVENOUS at 20:32

## 2020-10-09 RX ADMIN — LIDOCAINE HYDROCHLORIDE AND EPINEPHRINE 5 ML: 20; 5 INJECTION, SOLUTION EPIDURAL; INFILTRATION; INTRACAUDAL; PERINEURAL at 22:02

## 2020-10-09 RX ADMIN — LIDOCAINE HYDROCHLORIDE AND EPINEPHRINE 2 ML: 15; 5 INJECTION, SOLUTION EPIDURAL at 13:17

## 2020-10-09 RX ADMIN — ONDANSETRON 4 MG: 2 INJECTION INTRAMUSCULAR; INTRAVENOUS at 16:04

## 2020-10-09 RX ADMIN — METOCLOPRAMIDE HYDROCHLORIDE 10 MG: 5 INJECTION INTRAMUSCULAR; INTRAVENOUS at 22:52

## 2020-10-09 RX ADMIN — ROPIVACAINE HYDROCHLORIDE 5 ML: 2 INJECTION, SOLUTION EPIDURAL; INFILTRATION at 13:16

## 2020-10-09 RX ADMIN — SODIUM CHLORIDE 125 ML/HR: 0.9 INJECTION, SOLUTION INTRAVENOUS at 03:06

## 2020-10-09 RX ADMIN — LIDOCAINE HYDROCHLORIDE AND EPINEPHRINE 5 ML: 20; 5 INJECTION, SOLUTION EPIDURAL; INFILTRATION; INTRACAUDAL; PERINEURAL at 21:59

## 2020-10-09 RX ADMIN — FENTANYL CITRATE 100 MCG: 50 INJECTION INTRAMUSCULAR; INTRAVENOUS at 22:02

## 2020-10-09 RX ADMIN — BUTORPHANOL TARTRATE 1 MG: 1 INJECTION, SOLUTION INTRAMUSCULAR; INTRAVENOUS at 05:56

## 2020-10-09 RX ADMIN — KETOROLAC TROMETHAMINE 30 MG: 30 INJECTION, SOLUTION INTRAMUSCULAR at 23:30

## 2020-10-09 RX ADMIN — CEFAZOLIN SODIUM 2000 MG: 2 SOLUTION INTRAVENOUS at 21:58

## 2020-10-10 LAB
BASOPHILS # BLD AUTO: 0.03 THOUSANDS/ΜL (ref 0–0.1)
BASOPHILS NFR BLD AUTO: 0 % (ref 0–1)
EOSINOPHIL # BLD AUTO: 0 THOUSAND/ΜL (ref 0–0.61)
EOSINOPHIL NFR BLD AUTO: 0 % (ref 0–6)
ERYTHROCYTE [DISTWIDTH] IN BLOOD BY AUTOMATED COUNT: 16.8 % (ref 11.6–15.1)
GLUCOSE SERPL-MCNC: 90 MG/DL (ref 65–140)
GLUCOSE SERPL-MCNC: 96 MG/DL (ref 65–140)
HCT VFR BLD AUTO: 29.7 % (ref 34.8–46.1)
HGB BLD-MCNC: 9.3 G/DL (ref 11.5–15.4)
IMM GRANULOCYTES # BLD AUTO: 0.07 THOUSAND/UL (ref 0–0.2)
IMM GRANULOCYTES NFR BLD AUTO: 1 % (ref 0–2)
LYMPHOCYTES # BLD AUTO: 1.1 THOUSANDS/ΜL (ref 0.6–4.47)
LYMPHOCYTES NFR BLD AUTO: 9 % (ref 14–44)
MCH RBC QN AUTO: 27.3 PG (ref 26.8–34.3)
MCHC RBC AUTO-ENTMCNC: 31.3 G/DL (ref 31.4–37.4)
MCV RBC AUTO: 87 FL (ref 82–98)
MONOCYTES # BLD AUTO: 0.66 THOUSAND/ΜL (ref 0.17–1.22)
MONOCYTES NFR BLD AUTO: 5 % (ref 4–12)
NEUTROPHILS # BLD AUTO: 10.54 THOUSANDS/ΜL (ref 1.85–7.62)
NEUTS SEG NFR BLD AUTO: 85 % (ref 43–75)
NRBC BLD AUTO-RTO: 0 /100 WBCS
PLATELET # BLD AUTO: 175 THOUSANDS/UL (ref 149–390)
PMV BLD AUTO: 10.2 FL (ref 8.9–12.7)
RBC # BLD AUTO: 3.41 MILLION/UL (ref 3.81–5.12)
WBC # BLD AUTO: 12.4 THOUSAND/UL (ref 4.31–10.16)

## 2020-10-10 PROCEDURE — 99024 POSTOP FOLLOW-UP VISIT: CPT | Performed by: OBSTETRICS & GYNECOLOGY

## 2020-10-10 PROCEDURE — 88307 TISSUE EXAM BY PATHOLOGIST: CPT | Performed by: PATHOLOGY

## 2020-10-10 PROCEDURE — 82948 REAGENT STRIP/BLOOD GLUCOSE: CPT

## 2020-10-10 PROCEDURE — 85025 COMPLETE CBC W/AUTO DIFF WBC: CPT | Performed by: OBSTETRICS & GYNECOLOGY

## 2020-10-10 RX ORDER — SODIUM CHLORIDE, SODIUM LACTATE, POTASSIUM CHLORIDE, CALCIUM CHLORIDE 600; 310; 30; 20 MG/100ML; MG/100ML; MG/100ML; MG/100ML
125 INJECTION, SOLUTION INTRAVENOUS CONTINUOUS
Status: DISCONTINUED | OUTPATIENT
Start: 2020-10-10 | End: 2020-10-12 | Stop reason: HOSPADM

## 2020-10-10 RX ORDER — SENNOSIDES 8.6 MG
1 TABLET ORAL DAILY PRN
Status: DISCONTINUED | OUTPATIENT
Start: 2020-10-10 | End: 2020-10-12 | Stop reason: HOSPADM

## 2020-10-10 RX ORDER — OXYCODONE HYDROCHLORIDE 5 MG/1
5 TABLET ORAL EVERY 4 HOURS PRN
Status: DISCONTINUED | OUTPATIENT
Start: 2020-10-10 | End: 2020-10-12 | Stop reason: HOSPADM

## 2020-10-10 RX ORDER — METOCLOPRAMIDE HYDROCHLORIDE 5 MG/ML
5 INJECTION INTRAMUSCULAR; INTRAVENOUS EVERY 6 HOURS PRN
Status: ACTIVE | OUTPATIENT
Start: 2020-10-10 | End: 2020-10-11

## 2020-10-10 RX ORDER — NALOXONE HYDROCHLORIDE 0.4 MG/ML
0.1 INJECTION, SOLUTION INTRAMUSCULAR; INTRAVENOUS; SUBCUTANEOUS
Status: ACTIVE | OUTPATIENT
Start: 2020-10-10 | End: 2020-10-11

## 2020-10-10 RX ORDER — OXYTOCIN/RINGER'S LACTATE 30/500 ML
62.5 PLASTIC BAG, INJECTION (ML) INTRAVENOUS CONTINUOUS
Status: DISCONTINUED | OUTPATIENT
Start: 2020-10-10 | End: 2020-10-10

## 2020-10-10 RX ORDER — ONDANSETRON 2 MG/ML
4 INJECTION INTRAMUSCULAR; INTRAVENOUS EVERY 8 HOURS PRN
Status: DISCONTINUED | OUTPATIENT
Start: 2020-10-10 | End: 2020-10-12 | Stop reason: HOSPADM

## 2020-10-10 RX ORDER — ONDANSETRON 2 MG/ML
4 INJECTION INTRAMUSCULAR; INTRAVENOUS EVERY 4 HOURS PRN
Status: ACTIVE | OUTPATIENT
Start: 2020-10-10 | End: 2020-10-11

## 2020-10-10 RX ORDER — MAGNESIUM HYDROXIDE/ALUMINUM HYDROXICE/SIMETHICONE 120; 1200; 1200 MG/30ML; MG/30ML; MG/30ML
15 SUSPENSION ORAL EVERY 6 HOURS PRN
Status: DISCONTINUED | OUTPATIENT
Start: 2020-10-10 | End: 2020-10-12 | Stop reason: HOSPADM

## 2020-10-10 RX ORDER — DIAPER,BRIEF,INFANT-TODD,DISP
1 EACH MISCELLANEOUS DAILY PRN
Status: DISCONTINUED | OUTPATIENT
Start: 2020-10-10 | End: 2020-10-12 | Stop reason: HOSPADM

## 2020-10-10 RX ORDER — OXYTOCIN/RINGER'S LACTATE 30/500 ML
62.5 PLASTIC BAG, INJECTION (ML) INTRAVENOUS ONCE
Status: DISCONTINUED | OUTPATIENT
Start: 2020-10-10 | End: 2020-10-10

## 2020-10-10 RX ORDER — ACETAMINOPHEN 325 MG/1
650 TABLET ORAL EVERY 6 HOURS
Status: DISCONTINUED | OUTPATIENT
Start: 2020-10-10 | End: 2020-10-12 | Stop reason: HOSPADM

## 2020-10-10 RX ORDER — CALCIUM CARBONATE 200(500)MG
1000 TABLET,CHEWABLE ORAL DAILY PRN
Status: DISCONTINUED | OUTPATIENT
Start: 2020-10-10 | End: 2020-10-12 | Stop reason: HOSPADM

## 2020-10-10 RX ORDER — HYDROMORPHONE HCL/PF 1 MG/ML
0.5 SYRINGE (ML) INJECTION EVERY 2 HOUR PRN
Status: DISCONTINUED | OUTPATIENT
Start: 2020-10-10 | End: 2020-10-12 | Stop reason: HOSPADM

## 2020-10-10 RX ORDER — CEFAZOLIN SODIUM 2 G/50ML
2000 SOLUTION INTRAVENOUS ONCE
Status: COMPLETED | OUTPATIENT
Start: 2020-10-10 | End: 2020-10-10

## 2020-10-10 RX ORDER — ACETAMINOPHEN 325 MG/1
650 TABLET ORAL EVERY 4 HOURS PRN
Status: DISCONTINUED | OUTPATIENT
Start: 2020-10-10 | End: 2020-10-10

## 2020-10-10 RX ORDER — DIPHENHYDRAMINE HYDROCHLORIDE 50 MG/ML
25 INJECTION INTRAMUSCULAR; INTRAVENOUS EVERY 6 HOURS PRN
Status: ACTIVE | OUTPATIENT
Start: 2020-10-10 | End: 2020-10-11

## 2020-10-10 RX ORDER — KETOROLAC TROMETHAMINE 30 MG/ML
30 INJECTION, SOLUTION INTRAMUSCULAR; INTRAVENOUS EVERY 6 HOURS PRN
Status: ACTIVE | OUTPATIENT
Start: 2020-10-10 | End: 2020-10-11

## 2020-10-10 RX ORDER — ACETAMINOPHEN 325 MG/1
650 TABLET ORAL EVERY 4 HOURS PRN
Status: DISCONTINUED | OUTPATIENT
Start: 2020-10-10 | End: 2020-10-12 | Stop reason: HOSPADM

## 2020-10-10 RX ORDER — DOCUSATE SODIUM 100 MG/1
100 CAPSULE, LIQUID FILLED ORAL 2 TIMES DAILY
Status: DISCONTINUED | OUTPATIENT
Start: 2020-10-10 | End: 2020-10-12 | Stop reason: HOSPADM

## 2020-10-10 RX ORDER — OXYCODONE HYDROCHLORIDE 10 MG/1
10 TABLET ORAL EVERY 4 HOURS PRN
Status: DISCONTINUED | OUTPATIENT
Start: 2020-10-10 | End: 2020-10-12 | Stop reason: HOSPADM

## 2020-10-10 RX ORDER — DEXAMETHASONE SODIUM PHOSPHATE 4 MG/ML
8 INJECTION, SOLUTION INTRA-ARTICULAR; INTRALESIONAL; INTRAMUSCULAR; INTRAVENOUS; SOFT TISSUE ONCE AS NEEDED
Status: ACTIVE | OUTPATIENT
Start: 2020-10-10 | End: 2020-10-11

## 2020-10-10 RX ORDER — IBUPROFEN 600 MG/1
600 TABLET ORAL EVERY 6 HOURS PRN
Status: DISCONTINUED | OUTPATIENT
Start: 2020-10-11 | End: 2020-10-12 | Stop reason: HOSPADM

## 2020-10-10 RX ADMIN — DOCUSATE SODIUM 100 MG: 100 CAPSULE ORAL at 17:53

## 2020-10-10 RX ADMIN — ACETAMINOPHEN 650 MG: 325 TABLET, FILM COATED ORAL at 16:14

## 2020-10-10 RX ADMIN — CEFAZOLIN SODIUM 2000 MG: 2 SOLUTION INTRAVENOUS at 09:04

## 2020-10-10 RX ADMIN — ACETAMINOPHEN 650 MG: 325 TABLET, FILM COATED ORAL at 22:39

## 2020-10-10 RX ADMIN — ACETAMINOPHEN 650 MG: 325 TABLET, FILM COATED ORAL at 04:08

## 2020-10-10 RX ADMIN — ENOXAPARIN SODIUM 40 MG: 40 INJECTION SUBCUTANEOUS at 09:09

## 2020-10-10 RX ADMIN — SODIUM CHLORIDE, SODIUM LACTATE, POTASSIUM CHLORIDE, AND CALCIUM CHLORIDE 125 ML/HR: .6; .31; .03; .02 INJECTION, SOLUTION INTRAVENOUS at 02:27

## 2020-10-10 RX ADMIN — OXYCODONE HYDROCHLORIDE 5 MG: 5 TABLET ORAL at 22:39

## 2020-10-10 RX ADMIN — Medication 62.5 MILLI-UNITS/MIN: at 01:30

## 2020-10-10 RX ADMIN — ACETAMINOPHEN 650 MG: 325 TABLET, FILM COATED ORAL at 10:23

## 2020-10-11 LAB
GLUCOSE SERPL-MCNC: 101 MG/DL (ref 65–140)
GLUCOSE SERPL-MCNC: 95 MG/DL (ref 65–140)
GLUCOSE SERPL-MCNC: 97 MG/DL (ref 65–140)
GLUCOSE SERPL-MCNC: 98 MG/DL (ref 65–140)

## 2020-10-11 PROCEDURE — 99024 POSTOP FOLLOW-UP VISIT: CPT | Performed by: OBSTETRICS & GYNECOLOGY

## 2020-10-11 PROCEDURE — 82948 REAGENT STRIP/BLOOD GLUCOSE: CPT

## 2020-10-11 RX ADMIN — OXYCODONE HYDROCHLORIDE 10 MG: 10 TABLET ORAL at 08:15

## 2020-10-11 RX ADMIN — ACETAMINOPHEN 650 MG: 325 TABLET, FILM COATED ORAL at 09:53

## 2020-10-11 RX ADMIN — IBUPROFEN 600 MG: 600 TABLET, FILM COATED ORAL at 20:24

## 2020-10-11 RX ADMIN — ENOXAPARIN SODIUM 40 MG: 40 INJECTION SUBCUTANEOUS at 08:15

## 2020-10-11 RX ADMIN — DOCUSATE SODIUM 100 MG: 100 CAPSULE ORAL at 08:15

## 2020-10-11 RX ADMIN — ACETAMINOPHEN 650 MG: 325 TABLET, FILM COATED ORAL at 22:00

## 2020-10-11 RX ADMIN — IBUPROFEN 600 MG: 600 TABLET, FILM COATED ORAL at 13:11

## 2020-10-11 RX ADMIN — ACETAMINOPHEN 650 MG: 325 TABLET, FILM COATED ORAL at 15:48

## 2020-10-11 RX ADMIN — DOCUSATE SODIUM 100 MG: 100 CAPSULE ORAL at 18:37

## 2020-10-12 VITALS
OXYGEN SATURATION: 96 % | DIASTOLIC BLOOD PRESSURE: 63 MMHG | HEART RATE: 51 BPM | RESPIRATION RATE: 20 BRPM | SYSTOLIC BLOOD PRESSURE: 127 MMHG | BODY MASS INDEX: 36.44 KG/M2 | HEIGHT: 60 IN | WEIGHT: 185.63 LBS | TEMPERATURE: 98 F

## 2020-10-12 LAB
GLUCOSE SERPL-MCNC: 123 MG/DL (ref 65–140)
GLUCOSE SERPL-MCNC: 91 MG/DL (ref 65–140)
GLUCOSE SERPL-MCNC: 98 MG/DL (ref 65–140)
GLUCOSE SERPL-MCNC: 99 MG/DL (ref 65–140)

## 2020-10-12 PROCEDURE — 99024 POSTOP FOLLOW-UP VISIT: CPT | Performed by: OBSTETRICS & GYNECOLOGY

## 2020-10-12 PROCEDURE — 82948 REAGENT STRIP/BLOOD GLUCOSE: CPT

## 2020-10-12 RX ORDER — DOCUSATE SODIUM 100 MG/1
100 CAPSULE, LIQUID FILLED ORAL 2 TIMES DAILY
Qty: 10 CAPSULE | Refills: 0
Start: 2020-10-12 | End: 2022-05-17 | Stop reason: ALTCHOICE

## 2020-10-12 RX ORDER — IBUPROFEN 600 MG/1
600 TABLET ORAL EVERY 6 HOURS PRN
Qty: 30 TABLET | Refills: 0
Start: 2020-10-12

## 2020-10-12 RX ORDER — ACETAMINOPHEN 325 MG/1
650 TABLET ORAL EVERY 6 HOURS
Refills: 0
Start: 2020-10-12

## 2020-10-12 RX ORDER — OXYCODONE HYDROCHLORIDE 5 MG/1
5 TABLET ORAL EVERY 4 HOURS PRN
Qty: 6 TABLET | Refills: 0
Start: 2020-10-12 | End: 2020-10-22

## 2020-10-12 RX ADMIN — OXYCODONE HYDROCHLORIDE 10 MG: 10 TABLET ORAL at 13:32

## 2020-10-12 RX ADMIN — ACETAMINOPHEN 650 MG: 325 TABLET, FILM COATED ORAL at 09:57

## 2020-10-12 RX ADMIN — ENOXAPARIN SODIUM 40 MG: 40 INJECTION SUBCUTANEOUS at 09:44

## 2020-10-12 RX ADMIN — DOCUSATE SODIUM 100 MG: 100 CAPSULE ORAL at 09:43

## 2020-10-12 RX ADMIN — IBUPROFEN 600 MG: 600 TABLET, FILM COATED ORAL at 17:04

## 2020-10-12 RX ADMIN — ACETAMINOPHEN 650 MG: 325 TABLET, FILM COATED ORAL at 04:03

## 2020-10-12 RX ADMIN — ACETAMINOPHEN 650 MG: 325 TABLET, FILM COATED ORAL at 17:04

## 2020-10-12 RX ADMIN — DOCUSATE SODIUM 100 MG: 100 CAPSULE ORAL at 17:04

## 2020-10-15 ENCOUNTER — POSTPARTUM VISIT (OUTPATIENT)
Dept: OBGYN CLINIC | Facility: CLINIC | Age: 35
End: 2020-10-15

## 2020-10-15 VITALS
DIASTOLIC BLOOD PRESSURE: 74 MMHG | HEIGHT: 60 IN | WEIGHT: 175 LBS | TEMPERATURE: 97.9 F | BODY MASS INDEX: 34.36 KG/M2 | SYSTOLIC BLOOD PRESSURE: 122 MMHG

## 2020-10-15 DIAGNOSIS — O09.523 MULTIGRAVIDA OF ADVANCED MATERNAL AGE IN THIRD TRIMESTER: ICD-10-CM

## 2020-10-15 DIAGNOSIS — Z3A.39 39 WEEKS GESTATION OF PREGNANCY: ICD-10-CM

## 2020-10-15 DIAGNOSIS — O09.893 ENCOUNTER FOR SUPERVISION OF HIGH RISK PREGNANCY DUE TO FETAL ANOMALY, THIRD TRIMESTER: ICD-10-CM

## 2020-10-15 DIAGNOSIS — D68.51 FACTOR V LEIDEN (HCC): ICD-10-CM

## 2020-10-15 DIAGNOSIS — O09.813 PREGNANCY RESULTING FROM IN VITRO FERTILIZATION IN THIRD TRIMESTER: ICD-10-CM

## 2020-10-15 DIAGNOSIS — O24.414 INSULIN CONTROLLED GESTATIONAL DIABETES MELLITUS (GDM) IN THIRD TRIMESTER: ICD-10-CM

## 2020-10-15 DIAGNOSIS — Z98.891 STATUS POST PRIMARY LOW TRANSVERSE CESAREAN SECTION: ICD-10-CM

## 2020-10-15 DIAGNOSIS — Z3A.37 37 WEEKS GESTATION OF PREGNANCY: ICD-10-CM

## 2020-10-15 PROCEDURE — 99024 POSTOP FOLLOW-UP VISIT: CPT | Performed by: OBSTETRICS & GYNECOLOGY

## 2020-10-20 ENCOUNTER — TELEPHONE (OUTPATIENT)
Dept: OTHER | Facility: OTHER | Age: 35
End: 2020-10-20

## 2020-10-21 ENCOUNTER — TELEPHONE (OUTPATIENT)
Dept: OBGYN CLINIC | Facility: CLINIC | Age: 35
End: 2020-10-21

## 2020-10-22 ENCOUNTER — POSTPARTUM VISIT (OUTPATIENT)
Dept: OBGYN CLINIC | Facility: CLINIC | Age: 35
End: 2020-10-22

## 2020-10-22 VITALS
BODY MASS INDEX: 31.61 KG/M2 | TEMPERATURE: 98 F | WEIGHT: 161 LBS | SYSTOLIC BLOOD PRESSURE: 120 MMHG | HEIGHT: 60 IN | DIASTOLIC BLOOD PRESSURE: 72 MMHG

## 2020-10-22 DIAGNOSIS — Z3A.39 39 WEEKS GESTATION OF PREGNANCY: ICD-10-CM

## 2020-10-22 DIAGNOSIS — O09.893 ENCOUNTER FOR SUPERVISION OF HIGH RISK PREGNANCY DUE TO FETAL ANOMALY, THIRD TRIMESTER: ICD-10-CM

## 2020-10-22 DIAGNOSIS — Z3A.37 37 WEEKS GESTATION OF PREGNANCY: ICD-10-CM

## 2020-10-22 DIAGNOSIS — Z98.891 STATUS POST PRIMARY LOW TRANSVERSE CESAREAN SECTION: ICD-10-CM

## 2020-10-22 DIAGNOSIS — D68.51 FACTOR V LEIDEN (HCC): ICD-10-CM

## 2020-10-22 DIAGNOSIS — O09.523 MULTIGRAVIDA OF ADVANCED MATERNAL AGE IN THIRD TRIMESTER: ICD-10-CM

## 2020-10-22 DIAGNOSIS — O09.813 PREGNANCY RESULTING FROM IN VITRO FERTILIZATION IN THIRD TRIMESTER: ICD-10-CM

## 2020-10-22 DIAGNOSIS — O24.414 INSULIN CONTROLLED GESTATIONAL DIABETES MELLITUS (GDM) IN THIRD TRIMESTER: Primary | ICD-10-CM

## 2020-10-22 PROCEDURE — 99024 POSTOP FOLLOW-UP VISIT: CPT | Performed by: OBSTETRICS & GYNECOLOGY

## 2020-10-28 NOTE — PROGRESS NOTES
NST procedure and expected outcome explained to patient  Daily fetal kick count reviewed and emphasized  Patient verbalized understanding of all and was receptive      Nona Bowen RN No

## 2020-10-30 ENCOUNTER — POSTPARTUM VISIT (OUTPATIENT)
Dept: OBGYN CLINIC | Facility: CLINIC | Age: 35
End: 2020-10-30

## 2020-10-30 VITALS
DIASTOLIC BLOOD PRESSURE: 66 MMHG | BODY MASS INDEX: 31.65 KG/M2 | WEIGHT: 161.2 LBS | TEMPERATURE: 97.6 F | HEIGHT: 60 IN | SYSTOLIC BLOOD PRESSURE: 92 MMHG

## 2020-10-30 PROCEDURE — 99024 POSTOP FOLLOW-UP VISIT: CPT | Performed by: OBSTETRICS & GYNECOLOGY

## 2020-11-06 ENCOUNTER — OFFICE VISIT (OUTPATIENT)
Dept: POSTPARTUM | Facility: CLINIC | Age: 35
End: 2020-11-06
Payer: COMMERCIAL

## 2020-11-06 VITALS — DIASTOLIC BLOOD PRESSURE: 78 MMHG | SYSTOLIC BLOOD PRESSURE: 110 MMHG

## 2020-11-06 DIAGNOSIS — Z62.820 COUNSELING FOR PARENT-CHILD PROBLEM: Primary | ICD-10-CM

## 2020-11-06 DIAGNOSIS — Z71.89 COUNSELING FOR PARENT-CHILD PROBLEM: Primary | ICD-10-CM

## 2020-11-06 DIAGNOSIS — Z91.89 BREASTFEEDING PROBLEM: ICD-10-CM

## 2020-11-06 PROCEDURE — 99404 PREV MED CNSL INDIV APPRX 60: CPT | Performed by: PEDIATRICS

## 2020-11-12 ENCOUNTER — LAB (OUTPATIENT)
Dept: LAB | Age: 35
End: 2020-11-12
Payer: COMMERCIAL

## 2020-11-12 ENCOUNTER — TELEPHONE (OUTPATIENT)
Dept: HEMATOLOGY ONCOLOGY | Facility: CLINIC | Age: 35
End: 2020-11-12

## 2020-11-12 DIAGNOSIS — D51.8 OTHER VITAMIN B12 DEFICIENCY ANEMIAS: ICD-10-CM

## 2020-11-12 DIAGNOSIS — D50.8 IRON DEFICIENCY ANEMIA SECONDARY TO INADEQUATE DIETARY IRON INTAKE: ICD-10-CM

## 2020-11-12 LAB
ALBUMIN SERPL BCP-MCNC: 3.7 G/DL (ref 3.5–5)
ALP SERPL-CCNC: 94 U/L (ref 46–116)
ALT SERPL W P-5'-P-CCNC: 35 U/L (ref 12–78)
ANION GAP SERPL CALCULATED.3IONS-SCNC: 4 MMOL/L (ref 4–13)
AST SERPL W P-5'-P-CCNC: 12 U/L (ref 5–45)
BASOPHILS # BLD AUTO: 0.03 THOUSANDS/ΜL (ref 0–0.1)
BASOPHILS NFR BLD AUTO: 0 % (ref 0–1)
BILIRUB SERPL-MCNC: 0.24 MG/DL (ref 0.2–1)
BUN SERPL-MCNC: 7 MG/DL (ref 5–25)
CALCIUM SERPL-MCNC: 9.4 MG/DL (ref 8.3–10.1)
CHLORIDE SERPL-SCNC: 106 MMOL/L (ref 100–108)
CO2 SERPL-SCNC: 28 MMOL/L (ref 21–32)
CREAT SERPL-MCNC: 0.7 MG/DL (ref 0.6–1.3)
EOSINOPHIL # BLD AUTO: 0.07 THOUSAND/ΜL (ref 0–0.61)
EOSINOPHIL NFR BLD AUTO: 1 % (ref 0–6)
ERYTHROCYTE [DISTWIDTH] IN BLOOD BY AUTOMATED COUNT: 14.8 % (ref 11.6–15.1)
FERRITIN SERPL-MCNC: 79 NG/ML (ref 8–388)
FOLATE SERPL-MCNC: 7.9 NG/ML (ref 3.1–17.5)
GFR SERPL CREATININE-BSD FRML MDRD: 113 ML/MIN/1.73SQ M
GLUCOSE SERPL-MCNC: 94 MG/DL (ref 65–140)
HCT VFR BLD AUTO: 40.8 % (ref 34.8–46.1)
HGB BLD-MCNC: 12.8 G/DL (ref 11.5–15.4)
IMM GRANULOCYTES # BLD AUTO: 0.03 THOUSAND/UL (ref 0–0.2)
IMM GRANULOCYTES NFR BLD AUTO: 0 % (ref 0–2)
IRON SATN MFR SERPL: 11 %
IRON SERPL-MCNC: 30 UG/DL (ref 50–170)
LDH SERPL-CCNC: 157 U/L (ref 81–234)
LYMPHOCYTES # BLD AUTO: 2.9 THOUSANDS/ΜL (ref 0.6–4.47)
LYMPHOCYTES NFR BLD AUTO: 31 % (ref 14–44)
MCH RBC QN AUTO: 27.4 PG (ref 26.8–34.3)
MCHC RBC AUTO-ENTMCNC: 31.4 G/DL (ref 31.4–37.4)
MCV RBC AUTO: 87 FL (ref 82–98)
MONOCYTES # BLD AUTO: 0.51 THOUSAND/ΜL (ref 0.17–1.22)
MONOCYTES NFR BLD AUTO: 6 % (ref 4–12)
NEUTROPHILS # BLD AUTO: 5.77 THOUSANDS/ΜL (ref 1.85–7.62)
NEUTS SEG NFR BLD AUTO: 62 % (ref 43–75)
NRBC BLD AUTO-RTO: 0 /100 WBCS
PLATELET # BLD AUTO: 304 THOUSANDS/UL (ref 149–390)
PMV BLD AUTO: 10 FL (ref 8.9–12.7)
POTASSIUM SERPL-SCNC: 4 MMOL/L (ref 3.5–5.3)
PROT SERPL-MCNC: 7.5 G/DL (ref 6.4–8.2)
RBC # BLD AUTO: 4.67 MILLION/UL (ref 3.81–5.12)
SODIUM SERPL-SCNC: 138 MMOL/L (ref 136–145)
TIBC SERPL-MCNC: 262 UG/DL (ref 250–450)
VIT B12 SERPL-MCNC: 576 PG/ML (ref 100–900)
WBC # BLD AUTO: 9.31 THOUSAND/UL (ref 4.31–10.16)

## 2020-11-12 PROCEDURE — 82728 ASSAY OF FERRITIN: CPT

## 2020-11-12 PROCEDURE — 83550 IRON BINDING TEST: CPT

## 2020-11-12 PROCEDURE — 80053 COMPREHEN METABOLIC PANEL: CPT

## 2020-11-12 PROCEDURE — 82607 VITAMIN B-12: CPT

## 2020-11-12 PROCEDURE — 83615 LACTATE (LD) (LDH) ENZYME: CPT

## 2020-11-12 PROCEDURE — 83540 ASSAY OF IRON: CPT

## 2020-11-12 PROCEDURE — 82746 ASSAY OF FOLIC ACID SERUM: CPT

## 2020-11-12 PROCEDURE — 36415 COLL VENOUS BLD VENIPUNCTURE: CPT

## 2020-11-12 PROCEDURE — 85025 COMPLETE CBC W/AUTO DIFF WBC: CPT

## 2020-11-13 ENCOUNTER — TELEPHONE (OUTPATIENT)
Dept: HEMATOLOGY ONCOLOGY | Facility: CLINIC | Age: 35
End: 2020-11-13

## 2020-11-16 ENCOUNTER — OFFICE VISIT (OUTPATIENT)
Dept: HEMATOLOGY ONCOLOGY | Facility: CLINIC | Age: 35
End: 2020-11-16
Payer: COMMERCIAL

## 2020-11-16 VITALS
TEMPERATURE: 97.2 F | SYSTOLIC BLOOD PRESSURE: 112 MMHG | OXYGEN SATURATION: 97 % | RESPIRATION RATE: 16 BRPM | BODY MASS INDEX: 31.88 KG/M2 | HEIGHT: 60 IN | HEART RATE: 82 BPM | DIASTOLIC BLOOD PRESSURE: 76 MMHG | WEIGHT: 162.4 LBS

## 2020-11-16 DIAGNOSIS — D68.51 HETEROZYGOUS FACTOR V LEIDEN MUTATION (HCC): ICD-10-CM

## 2020-11-16 DIAGNOSIS — D51.8 OTHER VITAMIN B12 DEFICIENCY ANEMIAS: ICD-10-CM

## 2020-11-16 DIAGNOSIS — D50.8 IRON DEFICIENCY ANEMIA SECONDARY TO INADEQUATE DIETARY IRON INTAKE: Primary | ICD-10-CM

## 2020-11-16 PROCEDURE — 99214 OFFICE O/P EST MOD 30 MIN: CPT | Performed by: INTERNAL MEDICINE

## 2020-11-23 ENCOUNTER — POSTPARTUM VISIT (OUTPATIENT)
Dept: OBGYN CLINIC | Facility: CLINIC | Age: 35
End: 2020-11-23

## 2020-11-23 VITALS
WEIGHT: 162.8 LBS | BODY MASS INDEX: 31.96 KG/M2 | SYSTOLIC BLOOD PRESSURE: 90 MMHG | HEIGHT: 60 IN | DIASTOLIC BLOOD PRESSURE: 66 MMHG

## 2020-11-23 PROCEDURE — 99024 POSTOP FOLLOW-UP VISIT: CPT | Performed by: OBSTETRICS & GYNECOLOGY

## 2020-12-14 ENCOUNTER — OFFICE VISIT (OUTPATIENT)
Dept: URGENT CARE | Age: 35
End: 2020-12-14
Payer: COMMERCIAL

## 2020-12-14 VITALS
WEIGHT: 160 LBS | RESPIRATION RATE: 18 BRPM | HEART RATE: 135 BPM | OXYGEN SATURATION: 96 % | BODY MASS INDEX: 31.41 KG/M2 | TEMPERATURE: 97.7 F | HEIGHT: 60 IN

## 2020-12-14 DIAGNOSIS — Z20.822 ENCOUNTER FOR SCREENING LABORATORY TESTING FOR COVID-19 VIRUS: Primary | ICD-10-CM

## 2020-12-14 PROCEDURE — U0003 INFECTIOUS AGENT DETECTION BY NUCLEIC ACID (DNA OR RNA); SEVERE ACUTE RESPIRATORY SYNDROME CORONAVIRUS 2 (SARS-COV-2) (CORONAVIRUS DISEASE [COVID-19]), AMPLIFIED PROBE TECHNIQUE, MAKING USE OF HIGH THROUGHPUT TECHNOLOGIES AS DESCRIBED BY CMS-2020-01-R: HCPCS | Performed by: PHYSICIAN ASSISTANT

## 2020-12-14 PROCEDURE — 99212 OFFICE O/P EST SF 10 MIN: CPT | Performed by: PHYSICIAN ASSISTANT

## 2020-12-16 LAB — SARS-COV-2 RNA SPEC QL NAA+PROBE: NOT DETECTED

## 2021-03-08 NOTE — PROGRESS NOTES
Date:  20  RE: Aide Lewis    : 1985  Estimated Date of Delivery: 10/14/20  EGA: 36w5d  OB/GYN: Socrates Needle  Insulin controlled gestational diabetes        flowsheet     Current regimen:  Lantus --50 units at bedtime (splits into 2 doses of 25 units each)  1800 calorie gestational diabetes meal plan; 3 meals/snacks including balance of carbohydrate, protein and fat with  bedtime snack of 1 carbohydrate with 2 to 3 protein servings  No more than 8 to 10 hours of fasting from bedtime snack to breakfast meal    SMBG  fasting and 2 hrs after start of meals with a OneTouch Verio meter  Walk 20-30 minutes after dinner if there are no exercise restrictions from OB  Plan:  Lantus- increase from 50 to 55 units at bedtime  (Split into 2 doses of 28 & 27 units)  Always have glucose available to treat hypoglycemia, use 15 by 15 rule  Continue and SMBG   8/3/20 HbA1c 5 5% normal; reordered today      20 fetal growth and TRESA appeared normal;      Diabetes Self Management Support Plan outside of ongoing care: Spouse/Family    Date due to report next:  Thursday, 20 or sooner if BG continues outside target ranges     Judge Delarosa, MS, RD, CDE  Diabetes Educator  [facility] Maternal Fetal Medicine  Diabetes and Pregnancy Program
Yes

## 2021-03-24 ENCOUNTER — TELEPHONE (OUTPATIENT)
Dept: HEMATOLOGY ONCOLOGY | Facility: CLINIC | Age: 36
End: 2021-03-24

## 2021-03-24 NOTE — TELEPHONE ENCOUNTER
Tried calling pt to remind her to have her labs done prior to her appointment on 3/29/21  Could not leave a message  Mailbox was full

## 2021-03-29 ENCOUNTER — APPOINTMENT (OUTPATIENT)
Dept: LAB | Age: 36
End: 2021-03-29
Payer: COMMERCIAL

## 2021-03-29 ENCOUNTER — TELEPHONE (OUTPATIENT)
Dept: HEMATOLOGY ONCOLOGY | Facility: CLINIC | Age: 36
End: 2021-03-29

## 2021-03-29 ENCOUNTER — OFFICE VISIT (OUTPATIENT)
Dept: URGENT CARE | Age: 36
End: 2021-03-29
Payer: COMMERCIAL

## 2021-03-29 DIAGNOSIS — D50.8 IRON DEFICIENCY ANEMIA SECONDARY TO INADEQUATE DIETARY IRON INTAKE: ICD-10-CM

## 2021-03-29 DIAGNOSIS — D51.8 OTHER VITAMIN B12 DEFICIENCY ANEMIAS: ICD-10-CM

## 2021-03-29 DIAGNOSIS — K13.79 MOUTH SORE: ICD-10-CM

## 2021-03-29 DIAGNOSIS — K05.10 GINGIVOSTOMATITIS: Primary | ICD-10-CM

## 2021-03-29 LAB
ALBUMIN SERPL BCP-MCNC: 3.9 G/DL (ref 3.5–5)
ALP SERPL-CCNC: 65 U/L (ref 46–116)
ALT SERPL W P-5'-P-CCNC: 40 U/L (ref 12–78)
ANION GAP SERPL CALCULATED.3IONS-SCNC: 5 MMOL/L (ref 4–13)
AST SERPL W P-5'-P-CCNC: 20 U/L (ref 5–45)
BASOPHILS # BLD AUTO: 0.04 THOUSANDS/ΜL (ref 0–0.1)
BASOPHILS NFR BLD AUTO: 1 % (ref 0–1)
BILIRUB SERPL-MCNC: 0.29 MG/DL (ref 0.2–1)
BUN SERPL-MCNC: 7 MG/DL (ref 5–25)
CALCIUM SERPL-MCNC: 9 MG/DL (ref 8.3–10.1)
CHLORIDE SERPL-SCNC: 109 MMOL/L (ref 100–108)
CO2 SERPL-SCNC: 26 MMOL/L (ref 21–32)
CREAT SERPL-MCNC: 0.65 MG/DL (ref 0.6–1.3)
EOSINOPHIL # BLD AUTO: 0.07 THOUSAND/ΜL (ref 0–0.61)
EOSINOPHIL NFR BLD AUTO: 1 % (ref 0–6)
ERYTHROCYTE [DISTWIDTH] IN BLOOD BY AUTOMATED COUNT: 13.5 % (ref 11.6–15.1)
FERRITIN SERPL-MCNC: 43 NG/ML (ref 8–388)
GFR SERPL CREATININE-BSD FRML MDRD: 115 ML/MIN/1.73SQ M
GLUCOSE SERPL-MCNC: 107 MG/DL (ref 65–140)
HCT VFR BLD AUTO: 42.1 % (ref 34.8–46.1)
HGB BLD-MCNC: 13.2 G/DL (ref 11.5–15.4)
IMM GRANULOCYTES # BLD AUTO: 0.02 THOUSAND/UL (ref 0–0.2)
IMM GRANULOCYTES NFR BLD AUTO: 0 % (ref 0–2)
IRON SATN MFR SERPL: 14 %
IRON SERPL-MCNC: 43 UG/DL (ref 50–170)
LYMPHOCYTES # BLD AUTO: 2.84 THOUSANDS/ΜL (ref 0.6–4.47)
LYMPHOCYTES NFR BLD AUTO: 36 % (ref 14–44)
MCH RBC QN AUTO: 27.5 PG (ref 26.8–34.3)
MCHC RBC AUTO-ENTMCNC: 31.4 G/DL (ref 31.4–37.4)
MCV RBC AUTO: 88 FL (ref 82–98)
MONOCYTES # BLD AUTO: 0.52 THOUSAND/ΜL (ref 0.17–1.22)
MONOCYTES NFR BLD AUTO: 7 % (ref 4–12)
NEUTROPHILS # BLD AUTO: 4.46 THOUSANDS/ΜL (ref 1.85–7.62)
NEUTS SEG NFR BLD AUTO: 55 % (ref 43–75)
NRBC BLD AUTO-RTO: 0 /100 WBCS
PLATELET # BLD AUTO: 316 THOUSANDS/UL (ref 149–390)
PMV BLD AUTO: 10.3 FL (ref 8.9–12.7)
POTASSIUM SERPL-SCNC: 4.1 MMOL/L (ref 3.5–5.3)
PROT SERPL-MCNC: 7.6 G/DL (ref 6.4–8.2)
RBC # BLD AUTO: 4.8 MILLION/UL (ref 3.81–5.12)
SODIUM SERPL-SCNC: 140 MMOL/L (ref 136–145)
TIBC SERPL-MCNC: 297 UG/DL (ref 250–450)
VIT B12 SERPL-MCNC: 563 PG/ML (ref 100–900)
WBC # BLD AUTO: 7.95 THOUSAND/UL (ref 4.31–10.16)

## 2021-03-29 PROCEDURE — 99213 OFFICE O/P EST LOW 20 MIN: CPT | Performed by: NURSE PRACTITIONER

## 2021-03-29 PROCEDURE — 36415 COLL VENOUS BLD VENIPUNCTURE: CPT

## 2021-03-29 PROCEDURE — 82728 ASSAY OF FERRITIN: CPT

## 2021-03-29 PROCEDURE — 85025 COMPLETE CBC W/AUTO DIFF WBC: CPT

## 2021-03-29 PROCEDURE — 83550 IRON BINDING TEST: CPT

## 2021-03-29 PROCEDURE — 83540 ASSAY OF IRON: CPT

## 2021-03-29 PROCEDURE — 82607 VITAMIN B-12: CPT

## 2021-03-29 PROCEDURE — 80053 COMPREHEN METABOLIC PANEL: CPT

## 2021-03-29 RX ORDER — AMOXICILLIN 875 MG/1
875 TABLET, COATED ORAL 2 TIMES DAILY
Qty: 14 TABLET | Refills: 0 | Status: SHIPPED | OUTPATIENT
Start: 2021-03-29 | End: 2021-04-05

## 2021-03-29 NOTE — TELEPHONE ENCOUNTER
PT did not show for her appt on 3/29/21  Labs were not done  MUST HAVE LABS DONE PRIOR TO RESCHEDULING APPT

## 2021-03-29 NOTE — PROGRESS NOTES
[address] [facility] [address] [id number] Now        NAME: Devin William is a 28 y o  female  : 1985    MRN: [id number]  DATE: 2021  TIME: 2:15 PM    Assessment and Plan   Gingivostomatitis [K05 10]  1  Gingivostomatitis  amoxicillin (AMOXIL) 875 mg tablet   2  Mouth sore  al mag oxide-diphenhydramine-lidocaine viscous (MAGIC MOUTHWASH) 1:1:1 suspension         Patient Instructions     Take meds as directed  F/u with dentist   Proceed to  ER if symptoms worsen  Chief Complaint     Chief Complaint   Patient presents with    Earache     5 days     Oral Pain     mouth sore          History of Present Illness       HPI   Reports she started having pain in her gums about five days ago and that has spread toward the left ear  No fever  No dizziness  No drainage from the ear  Last dental visit was 1 year ago  Says she just stopped breast feeding  Review of Systems   Review of Systems   Constitutional: Negative for chills and fever  HENT: Positive for dental problem (gum pain on the left side of the jaw, with sores in her mouth) and ear pain (left ear)  Negative for congestion, hearing loss, sore throat and trouble swallowing  Respiratory: Negative for cough, shortness of breath and wheezing  Cardiovascular: Negative for chest pain  Current Medications       Current Outpatient Medications:     acetaminophen (TYLENOL) 325 mg tablet, Take 2 tablets (650 mg total) by mouth every 6 (six) hours (Patient not taking: Reported on 2020), Disp: , Rfl: 0    al mag oxide-diphenhydramine-lidocaine viscous (MAGIC MOUTHWASH) 1:1:1 suspension, Swish and spit 10 mL every 4 (four) hours as needed for mouth pain or discomfort (while awake), Disp: 200 mL, Rfl: 0    amoxicillin (AMOXIL) 875 mg tablet, Take 1 tablet (875 mg total) by mouth 2 (two) times a day for 7 days, Disp: 14 tablet, Rfl: 0    Blood Glucose Monitoring Suppl (ONETOUCH VERIO FLEX SYSTEM) w/Device KIT, Test 4 times per day   (Patient not taking: Reported on 12/14/2020), Disp: 1 kit, Rfl: 0    docusate sodium (COLACE) 100 mg capsule, Take 1 capsule (100 mg total) by mouth 2 (two) times a day (Patient not taking: Reported on 10/15/2020), Disp: 10 capsule, Rfl: 0    enoxaparin (LOVENOX) 40 mg/0 4 mL, Inject 0 4 mL under the skin daily at bedtime, Disp: , Rfl:     enoxaparin (LOVENOX) 40 mg/0 4 mL, Inject 0 4 mL (40 mg total) under the skin every 24 hours (Patient not taking: Reported on 11/23/2020), Disp: 16 8 mL, Rfl: 0    ibuprofen (MOTRIN) 600 mg tablet, Take 1 tablet (600 mg total) by mouth every 6 (six) hours as needed for moderate pain (Patient not taking: Reported on 10/15/2020), Disp: 30 tablet, Rfl: 0    Insulin Pen Needle 31G X 5 MM MISC, Inject under the skin daily at bedtime Use one a day or as directed , Disp: 100 each, Rfl: 1    levothyroxine (Synthroid) 25 mcg tablet, Take 25 mcg by mouth daily , Disp: , Rfl:     OneTouch Delica Lancets 90C MISC, Test 4 times per day  (Patient not taking: Reported on 10/15/2020), Disp: 100 each, Rfl: 6    ONETOUCH VERIO test strip, Test 4 times per day   (Patient not taking: Reported on 10/15/2020), Disp: 100 each, Rfl: 6    Prenatal Vit-Fe Fumarate-FA (PRENATAL VITAMINS PLUS PO), Take 1 tablet by mouth daily, Disp: , Rfl:     witch hazel-glycerin (TUCKS) topical pad, Apply 1 pad topically every 2 (two) hours as needed for irritation (Patient not taking: Reported on 10/15/2020), Disp: , Rfl: 0    Current Allergies     Allergies as of 03/29/2021    (No Known Allergies)            The following portions of the patient's history were reviewed and updated as appropriate: allergies, current medications, past family history, past medical history, past social history, past surgical history and problem list      Past Medical History:   Diagnosis Date    Disease of thyroid gland     Factor 5 Leiden mutation, heterozygous ([facility] )     History of PCOS     MTHFR mutation ([facility] )     Pre-diabetes        Past Surgical History:   Procedure Laterality Date    DE  DELIVERY ONLY N/A 10/9/2020    Procedure:  SECTION (); Surgeon: Alondra Tobar MD;  Location: [facility] ;  Service: Obstetrics    REDUCTION MAMMAPLASTY      WISDOM TOOTH EXTRACTION         Family History   Problem Relation Age of Onset    Diabetes Mother     Migraines Mother     Polycystic ovary syndrome Mother     Thyroid disease Mother     Thyroid disease Sister     Diabetes Maternal Grandmother     Thyroid disease Maternal Grandmother     Thyroid disease Maternal Aunt     Melanoma Family          Medications have been verified  Objective   LMP 2021   Patient's last menstrual period was 2021  Physical Exam     Physical Exam  Constitutional:       Appearance: She is not ill-appearing or diaphoretic  HENT:      Right Ear: Tympanic membrane and ear canal normal       Left Ear: Tympanic membrane and ear canal normal       Nose: No rhinorrhea  Mouth/Throat:      Pharynx: No posterior oropharyngeal erythema  Comments: There left lower gum is mild to moderately erythematous, and inflamed  TTP  A couple of canker sores also noted  Neurological:      Mental Status: She is alert

## 2021-03-29 NOTE — TELEPHONE ENCOUNTER
Left message to rs  Missed appt and also  Stated for her to have   Her labs done and call  Back to rs

## 2021-03-29 NOTE — PATIENT INSTRUCTIONS
Gingivostomatitis   WHAT YOU NEED TO KNOW:   Gingivostomatitis (GS) is a condition that causes painful sores on the lips, tongue, gums, and inside the mouth  GS is caused by the herpes simplex virus  The virus spreads easily from person to person through saliva or shared objects  The sores usually heal within 2 weeks with treatment  DISCHARGE INSTRUCTIONS:   Return to the emergency department if:   · You have severe pain  Contact your healthcare provider if:   · Your fever or other symptoms return after treatment  · You are urinating less than usual     · Your mouth sores are draining pus or blood  · You have questions or concerns about your condition or care  Medicines: You may need any of the following:  · Acetaminophen  decreases pain and fever  It is available without a doctor's order  Ask how much to take and how often to take it  Follow directions  Acetaminophen can cause liver damage if not taken correctly  · NSAIDs , such as ibuprofen, help decrease swelling, pain, and fever  This medicine is available with or without a doctor's order  NSAIDs can cause stomach bleeding or kidney problems in certain people  If you take blood thinner medicine, always ask your healthcare provider if NSAIDs are safe for you  Always read the medicine label and follow directions  · Numbing medicine  helps decrease pain from your mouth sores  This medicine is usually a liquid that you swish in your mouth and then spit out  · Antiviral medicine  helps treat a viral infection  · Take your medicine as directed  Contact your healthcare provider if you think your medicine is not helping or if you have side effects  Tell him of her if you are allergic to any medicine  Keep a list of the medicines, vitamins, and herbs you take  Include the amounts, and when and why you take them  Bring the list or the pill bottles to follow-up visits  Carry your medicine list with you in case of an emergency      Manage your symptoms:   · Brush your teeth at least 2 times each day  Floss at least 1 time each day  If you wear dentures, make sure they fit properly  · Drink liquids as directed to prevent dehydration  It is important to drink liquids even though your mouth is sore  Ask how much liquid to drink each day and which liquids are best for you  · Eat a variety of healthy foods  You may need to eat bland foods until your pain gets better  Healthy foods include fruits, vegetables, whole-grain breads, low-fat dairy products, beans, lean meats, and fish  Do not eat spicy, dry, hard, or acidic foods, such as oranges  · Do not smoke  Nicotine and other chemicals in cigarettes and cigars can cause mouth and lung damage  Ask your healthcare provider for information if you currently smoke and need help to quit  E-cigarettes or smokeless tobacco still contain nicotine  Talk to your healthcare provider before you use these products  Follow up with your healthcare provider as directed:  Write down your questions so you remember to ask them during your visits  © Copyright [address] Information is for End User's use only and may not be sold, redistributed or otherwise used for commercial purposes  All illustrations and images included in CareNotes® are the copyrighted property of [employer]  or [address] [place], [place] [address]   The above information is an  only  It is not intended as medical advice for individual conditions or treatments  Talk to your doctor, nurse or pharmacist before following any medical regimen to see if it is safe and effective for you

## 2021-03-30 DIAGNOSIS — Z23 ENCOUNTER FOR IMMUNIZATION: ICD-10-CM

## 2021-04-23 ENCOUNTER — IMMUNIZATIONS (OUTPATIENT)
Dept: FAMILY MEDICINE CLINIC | Facility: HOSPITAL | Age: 36
End: 2021-04-23

## 2021-04-23 DIAGNOSIS — Z23 ENCOUNTER FOR IMMUNIZATION: Primary | ICD-10-CM

## 2021-04-23 PROCEDURE — 91301 SARS-COV-2 / COVID-19 MRNA VACCINE (MODERNA) 100 MCG: CPT

## 2021-04-23 PROCEDURE — 0011A SARS-COV-2 / COVID-19 MRNA VACCINE (MODERNA) 100 MCG: CPT

## 2021-05-19 ENCOUNTER — ANNUAL EXAM (OUTPATIENT)
Dept: OBGYN CLINIC | Facility: CLINIC | Age: 36
End: 2021-05-19
Payer: COMMERCIAL

## 2021-05-19 VITALS
HEIGHT: 60 IN | SYSTOLIC BLOOD PRESSURE: 120 MMHG | WEIGHT: 163.2 LBS | DIASTOLIC BLOOD PRESSURE: 80 MMHG | BODY MASS INDEX: 32.04 KG/M2

## 2021-05-19 DIAGNOSIS — Z86.39 HISTORY OF HYPOTHYROIDISM: Primary | ICD-10-CM

## 2021-05-19 DIAGNOSIS — Z01.419 WOMEN'S ANNUAL ROUTINE GYNECOLOGICAL EXAMINATION: ICD-10-CM

## 2021-05-19 PROCEDURE — 87624 HPV HI-RISK TYP POOLED RSLT: CPT | Performed by: OBSTETRICS & GYNECOLOGY

## 2021-05-19 PROCEDURE — G0145 SCR C/V CYTO,THINLAYER,RESCR: HCPCS | Performed by: OBSTETRICS & GYNECOLOGY

## 2021-05-19 PROCEDURE — S0612 ANNUAL GYNECOLOGICAL EXAMINA: HCPCS | Performed by: OBSTETRICS & GYNECOLOGY

## 2021-05-19 NOTE — PROGRESS NOTES
Assessment/Plan:      The patient was informed of a stable gyn examination  A Pap smear was performed  Her  scars healing well  She was counseled to make arrange to see a dentist on a more regular basis  She does have a history of hypothyroidism with her last pregnancy  We will check thyroid levels today  Currently she is not taking any medication  She tried exercise lose more weight  She has received 1st dose of the maternal a COVID vaccine  She will be going home to [place] the [place] for a month visit  She should return my office in 1 year  Subjective:      Patient ID: Maxine Camilo is a 28 y o  female  HPI      This is a 66-year-old female from [place] she is a  1 para 1 with 1  section approximately 7 months ago  She now returns for her yearly exam   She has stop nursing  Currently she is using withdrawal for contraception  We had a discussion about other methods but she is content with that for the time being  Her menstrual cycles have returned which are normal   She is not happy with her weight  She needs to see her dentist on a more regular basis  She had a history of being hypothyroidism doing a pregnancy  We will check a thyroid level today  Currently she is not on any thyroid medication  She recently had the 1st dose of the COVID vaccine  She has no complaints or issues  She will be going home the [place] the summer to visit for 3 weeks  Denies any problem depression or anxiety  She denies any major  GI complaint  Denies any new major family illnesses  She feels safe at home  The following portions of the patient's history were reviewed and updated as appropriate: allergies, current medications, past family history, past medical history, past social history, past surgical history and problem list     Review of Systems   All other systems reviewed and are negative          Objective:      /80   Ht 5' (1 524 m)   Wt 74 kg (163 lb 3 2 oz) LMP 2021 (Exact Date)   BMI 31 87 kg/m²          Physical Exam  Vitals signs reviewed  Exam conducted with a chaperone present  Constitutional:       Appearance: Normal appearance  HENT:      Head: Normocephalic and atraumatic  Eyes:      Extraocular Movements: Extraocular movements intact  Neck:      Musculoskeletal: Normal range of motion and neck supple  Cardiovascular:      Rate and Rhythm: Normal rate and regular rhythm  Pulses: Normal pulses  Heart sounds: Normal heart sounds  Pulmonary:      Effort: Pulmonary effort is normal       Breath sounds: Normal breath sounds  Chest:      Breasts: Breasts are symmetrical          Right: Normal  No swelling, bleeding, inverted nipple, mass, nipple discharge, skin change or tenderness  Left: Normal  No swelling, bleeding, inverted nipple, mass, nipple discharge or skin change  Comments: There is evidence of bilateral breast reduction surgery  The scars are healing well  Abdominal:      General: Abdomen is flat  Bowel sounds are normal  There is no distension  Palpations: Abdomen is soft  There is no hepatomegaly, splenomegaly or mass  Tenderness: There is no abdominal tenderness  There is no right CVA tenderness, left CVA tenderness, guarding or rebound  Hernia: No hernia is present  There is no hernia in the umbilical area, ventral area, left inguinal area or right inguinal area  Comments:    section scar is well-healed  Genitourinary:     General: Normal vulva  Pubic Area: No rash or pubic lice  Labia:         Right: No rash, tenderness, lesion or injury  Left: No rash, tenderness, lesion or injury  Urethra: No prolapse, urethral pain, urethral swelling or urethral lesion  Vagina: Normal       Cervix: Normal       Uterus: Normal  Not deviated, not enlarged, not fixed, not tender and no uterine prolapse         Adnexa: Right adnexa normal and left adnexa normal       Rectum: Normal       Comments: The external genitalia normal limits the vagina is clean the cervix is closed uterus is anterior normal size there is no cervical motion tenderness  The adnexa clear bilaterally  A Pap smear was performed  There is no prolapse of the urethra, the bladder or the uterus  Musculoskeletal: Normal range of motion  Lymphadenopathy:      Upper Body:      Right upper body: No supraclavicular or axillary adenopathy  Left upper body: No supraclavicular or axillary adenopathy  Lower Body: No right inguinal adenopathy  No left inguinal adenopathy  Skin:     General: Skin is warm and dry  Neurological:      General: No focal deficit present  Mental Status: She is alert and oriented to person, place, and time  Psychiatric:         Mood and Affect: Mood normal          Behavior: Behavior normal          Thought Content:  Thought content normal

## 2021-05-19 NOTE — PATIENT INSTRUCTIONS
The patient was informed of a stable gyn examination  A Pap smear was performed  She was counseled to make arrangements to see her dentist a more regular basis  She will try to exercise lose more weight  She is going to 1st dose of COVID vaccine  She feels safe at home  Currently using withdrawal for contraception  She will consider other methods in the future  She return my office in 1 year  Will check a thyroid level screening test for her because of prior history of hypothyroidism in the last pregnancy

## 2021-05-24 ENCOUNTER — IMMUNIZATIONS (OUTPATIENT)
Dept: FAMILY MEDICINE CLINIC | Facility: HOSPITAL | Age: 36
End: 2021-05-24

## 2021-05-24 DIAGNOSIS — Z23 ENCOUNTER FOR IMMUNIZATION: Primary | ICD-10-CM

## 2021-05-24 LAB
LAB AP GYN PRIMARY INTERPRETATION: NORMAL
LAB AP LMP: NORMAL
Lab: NORMAL

## 2021-05-24 PROCEDURE — 0012A SARS-COV-2 / COVID-19 MRNA VACCINE (MODERNA) 100 MCG: CPT

## 2021-05-24 PROCEDURE — 91301 SARS-COV-2 / COVID-19 MRNA VACCINE (MODERNA) 100 MCG: CPT

## 2021-07-12 ENCOUNTER — APPOINTMENT (OUTPATIENT)
Dept: LAB | Age: 36
End: 2021-07-12
Payer: COMMERCIAL

## 2021-07-12 DIAGNOSIS — Z86.39 HISTORY OF HYPOTHYROIDISM: ICD-10-CM

## 2021-07-12 LAB — TSH SERPL DL<=0.05 MIU/L-ACNC: 2.5 UIU/ML (ref 0.36–3.74)

## 2021-07-12 PROCEDURE — 84443 ASSAY THYROID STIM HORMONE: CPT

## 2021-07-12 PROCEDURE — 36415 COLL VENOUS BLD VENIPUNCTURE: CPT

## 2021-07-13 ENCOUNTER — TELEPHONE (OUTPATIENT)
Dept: OBGYN CLINIC | Facility: CLINIC | Age: 36
End: 2021-07-13

## 2021-07-13 NOTE — TELEPHONE ENCOUNTER
----- Message from Lino Ruiz MD sent at 7/13/2021 11:47 AM EDT -----    Please inform this patient normal thyroid screening test

## 2021-08-28 ENCOUNTER — HOSPITAL ENCOUNTER (EMERGENCY)
Facility: HOSPITAL | Age: 36
Discharge: HOME | End: 2021-08-28
Attending: EMERGENCY MEDICINE
Payer: COMMERCIAL

## 2021-08-28 VITALS
DIASTOLIC BLOOD PRESSURE: 63 MMHG | RESPIRATION RATE: 16 BRPM | HEART RATE: 78 BPM | TEMPERATURE: 98.1 F | SYSTOLIC BLOOD PRESSURE: 125 MMHG | OXYGEN SATURATION: 97 %

## 2021-08-28 DIAGNOSIS — Z20.822 ENCOUNTER FOR LABORATORY TESTING FOR COVID-19 VIRUS: ICD-10-CM

## 2021-08-28 DIAGNOSIS — J02.9 VIRAL PHARYNGITIS: Primary | ICD-10-CM

## 2021-08-28 LAB — SARS-COV-2 RNA RESP QL NAA+PROBE: NEGATIVE

## 2021-08-28 PROCEDURE — U0005 INFEC AGEN DETEC AMPLI PROBE: HCPCS | Performed by: EMERGENCY MEDICINE

## 2021-08-28 PROCEDURE — 99284 EMERGENCY DEPT VISIT MOD MDM: CPT | Performed by: EMERGENCY MEDICINE

## 2021-08-28 PROCEDURE — 99283 EMERGENCY DEPT VISIT LOW MDM: CPT

## 2021-08-28 PROCEDURE — U0003 INFECTIOUS AGENT DETECTION BY NUCLEIC ACID (DNA OR RNA); SEVERE ACUTE RESPIRATORY SYNDROME CORONAVIRUS 2 (SARS-COV-2) (CORONAVIRUS DISEASE [COVID-19]), AMPLIFIED PROBE TECHNIQUE, MAKING USE OF HIGH THROUGHPUT TECHNOLOGIES AS DESCRIBED BY CMS-2020-01-R: HCPCS | Performed by: EMERGENCY MEDICINE

## 2021-08-28 RX ADMIN — DEXAMETHASONE SODIUM PHOSPHATE 10 MG: 10 INJECTION, SOLUTION INTRAMUSCULAR; INTRAVENOUS at 04:58

## 2021-08-28 NOTE — ED PROVIDER NOTES
History  Chief Complaint   Patient presents with    Earache    Sore Throat     Per pt " My ears and  throat are itching, this usually happens whenever I get an ear infection" Pt denies CP and SOB  66-year-old female presents to the emergency department for evaluation of sore throat and itchiness in her ears  The patient states that she typically has the symptoms when she has an ear infection so came to the emergency department for further evaluation  She states that she was taking Tylenol cold and flu yesterday to treat her symptoms  States that she was having difficulty sleeping so came to the emergency department  She reports that she has a child at home that has URI like symptoms  Her child was tested for RSV and COVID which both came back negative  She denies fevers, chills, nausea, vomiting, diarrhea, sick contacts, shortness of breath and recent travel  Prior to Admission Medications   Prescriptions Last Dose Informant Patient Reported? Taking? Blood Glucose Monitoring Suppl (ONETOUCH VERIO FLEX SYSTEM) w/Device KIT  Self No No   Sig: Test 4 times per day  Patient not taking: Reported on 12/14/2020   Insulin Pen Needle 31G X 5 MM MISC  Self No No   Sig: Inject under the skin daily at bedtime Use one a day or as directed  ONETOUCH VERIO test strip  Self No No   Sig: Test 4 times per day  Patient not taking: Reported on 32/69/8228   OneTouch Delica Lancets 00C MISC  Self No No   Sig: Test 4 times per day     Patient not taking: Reported on 10/15/2020   Prenatal Vit-Fe Fumarate-FA (PRENATAL VITAMINS PLUS PO)  Self Yes No   Sig: Take 1 tablet by mouth daily   acetaminophen (TYLENOL) 325 mg tablet  Self No No   Sig: Take 2 tablets (650 mg total) by mouth every 6 (six) hours   Patient not taking: Reported on 12/14/2020   al mag oxide-diphenhydramine-lidocaine viscous (MAGIC MOUTHWASH) 1:1:1 suspension   No No   Sig: Swish and spit 10 mL every 4 (four) hours as needed for mouth pain or discomfort (while awake)   Patient not taking: Reported on 2021   docusate sodium (COLACE) 100 mg capsule  Self No No   Sig: Take 1 capsule (100 mg total) by mouth 2 (two) times a day   Patient not taking: Reported on 10/15/2020   enoxaparin (LOVENOX) 40 mg/0 4 mL  Self Yes No   Sig: Inject 0 4 mL under the skin daily at bedtime   enoxaparin (LOVENOX) 40 mg/0 4 mL  Self No No   Sig: Inject 0 4 mL (40 mg total) under the skin every 24 hours   Patient not taking: Reported on 2020   ibuprofen (MOTRIN) 600 mg tablet  Self No No   Sig: Take 1 tablet (600 mg total) by mouth every 6 (six) hours as needed for moderate pain   Patient not taking: Reported on 10/15/2020   levothyroxine (Synthroid) 25 mcg tablet  Self Yes No   Sig: Take 25 mcg by mouth daily    witch hazel-glycerin (TUCKS) topical pad  Self No No   Sig: Apply 1 pad topically every 2 (two) hours as needed for irritation   Patient not taking: Reported on 10/15/2020      Facility-Administered Medications: None       Past Medical History:   Diagnosis Date    Disease of thyroid gland     Factor 5 Leiden mutation, heterozygous ([facility] [address] )     History of PCOS     MTHFR mutation     Pre-diabetes        Past Surgical History:   Procedure Laterality Date    NH  DELIVERY ONLY N/A 10/9/2020    Procedure:  SECTION (); Surgeon: Giovanny Bethea MD;  Location: [facility] ;  Service: Obstetrics    REDUCTION MAMMAPLASTY      WISDOM TOOTH EXTRACTION         Family History   Problem Relation Age of Onset    Diabetes Mother     Migraines Mother     Polycystic ovary syndrome Mother     Thyroid disease Mother     Thyroid disease Sister     Diabetes Maternal Grandmother     Thyroid disease Maternal Grandmother     Thyroid disease Maternal Aunt     Melanoma Family      I have reviewed and agree with the history as documented      E-Cigarette/Vaping    E-Cigarette Use Never User      E-Cigarette/Vaping Substances    Nicotine No     THC No     CBD No     Flavoring No     Other No     Unknown No      Social History     Tobacco Use    Smoking status: Never Smoker    Smokeless tobacco: Never Used   Vaping Use    Vaping Use: Never used   Substance Use Topics    Alcohol use: No    Drug use: No        Review of Systems   Constitutional: Negative for chills and fever  HENT: Positive for ear pain (discomfort/itchiness) and sore throat  Eyes: Negative for pain and visual disturbance  Respiratory: Negative for cough and shortness of breath  Cardiovascular: Negative for chest pain and palpitations  Gastrointestinal: Negative for abdominal pain and vomiting  Genitourinary: Negative for dysuria and hematuria  Musculoskeletal: Negative for arthralgias and back pain  Skin: Negative for color change and rash  Neurological: Negative for seizures and syncope  All other systems reviewed and are negative  Physical Exam  ED Triage Vitals [08/28/21 0424]   Temperature Pulse Respirations Blood Pressure SpO2   98 1 °F (36 7 °C) 78 16 125/63 97 %      Temp Source Heart Rate Source Patient Position - Orthostatic VS BP Location FiO2 (%)   Oral Monitor Sitting Right arm --      Pain Score       --             Orthostatic Vital Signs  Vitals:    08/28/21 0424   BP: 125/63   Pulse: 78   Patient Position - Orthostatic VS: Sitting       Physical Exam  Vitals and nursing note reviewed  Constitutional:       General: She is not in acute distress  Appearance: She is well-developed  HENT:      Head: Normocephalic and atraumatic  Right Ear: Tympanic membrane normal       Left Ear: Tympanic membrane normal       Mouth/Throat:      Mouth: Mucous membranes are moist       Pharynx: Uvula midline  Posterior oropharyngeal erythema present  No pharyngeal swelling or oropharyngeal exudate  Tonsils: No tonsillar exudate or tonsillar abscesses     Eyes:      Conjunctiva/sclera: Conjunctivae normal    Cardiovascular:      Rate and Rhythm: Normal rate and regular rhythm  Heart sounds: No murmur heard  Pulmonary:      Effort: Pulmonary effort is normal  No respiratory distress  Breath sounds: Normal breath sounds  Abdominal:      Palpations: Abdomen is soft  Tenderness: There is no abdominal tenderness  Musculoskeletal:      Cervical back: Neck supple  Skin:     General: Skin is warm and dry  Neurological:      Mental Status: She is alert  ED Medications  Medications   dexamethasone oral liquid 10 mg 1 mL (10 mg Oral Given 8/28/21 0458)       Diagnostic Studies  Results Reviewed     Procedure Component Value Units Date/Time    Novel Coronavirus (Covid-19),PCR [facility] - 24 Hour Routine [[id number]] Collected: 08/28/21 0501    Lab Status: In process Specimen: Nares from Nose Updated: 08/28/21 0504                 No orders to display         Procedures  Procedures      ED Course                             SBIRT 20yo+      Most Recent Value   SBIRT (23 yo +)   In order to provide better care to our patients, we are screening all of our patients for alcohol and drug use  Would it be okay to ask you these screening questions? No Filed at: 08/28/2021 0430                MDM  Number of Diagnoses or Management Options  Encounter for laboratory testing for COVID-19 virus  Viral pharyngitis  Diagnosis management comments: 63-year-old female presented to the emergency department for evaluation of sore throat and ear discomfort  On arrival the patient was awake, alert, oriented and in no acute distress  Initial vital signs were stable  On exam the patient was noted to have and erythematous throat  No exudates noted  The patient was treated with Decadron  Patient also requesting COVID-19 testing  Testing was sent  She is appropriate for discharge at this time with recommendation to establish care with a PCP and follow-up for continued symptoms  Isolation and return precautions were discussed      Patient agrees with the plan for discharge and feels comfortable to go home with proper f/u  Advised to return for worsening or additional problems  Diagnostic tests were reviewed and questions answered  Diagnosis, care plan and treatment options were discussed  The patient understands instructions and will follow up as directed  Disposition  Final diagnoses:   Viral pharyngitis   Encounter for laboratory testing for COVID-19 virus     Time reflects when diagnosis was documented in both MDM as applicable and the Disposition within this note     Time User Action Codes Description Comment    8/28/2021  5:05 AM Leatha Zarate Add [J02 9] Viral pharyngitis     8/28/2021  5:05 AM Leatha Zarate Add [Z20 822] Encounter for laboratory testing for COVID-19 virus       ED Disposition     ED Disposition Condition Date/Time Comment    Discharge Stable Sat Aug 28, 2021  5:05 AM Lucie Tyson discharge to home/self care              Follow-up Information     Follow up With Specialties Details Why Contact Info Additional Information    [employer]  Call   [phone]       [facility] Emergency Medicine Go to  If symptoms worsen [address]  [address] [place] [address] Emergency Department, [address] [facility] [address]          Discharge Medication List as of 8/28/2021  5:05 AM      CONTINUE these medications which have NOT CHANGED    Details   acetaminophen (TYLENOL) 325 mg tablet Take 2 tablets (650 mg total) by mouth every 6 (six) hours, Starting Mon 10/12/2020, No Print      al mag oxide-diphenhydramine-lidocaine viscous (MAGIC MOUTHWASH) 1:1:1 suspension Swish and spit 10 mL every 4 (four) hours as needed for mouth pain or discomfort (while awake), Starting Mon 3/29/2021, Normal      Blood Glucose Monitoring Suppl (ONETOUCH VERIO FLEX SYSTEM) w/Device KIT Test 4 times per day , Normal      docusate sodium (COLACE) 100 mg capsule Take 1 capsule (100 mg total) by mouth 2 (two) times a day, Starting Mon 10/12/2020, No Print      enoxaparin (LOVENOX) 40 mg/0 4 mL Inject 0 4 mL under the skin daily at bedtime, Historical Med      ibuprofen (MOTRIN) 600 mg tablet Take 1 tablet (600 mg total) by mouth every 6 (six) hours as needed for moderate pain, Starting Mon 10/12/2020, No Print      Insulin Pen Needle 31G X 5 MM MISC Inject under the skin daily at bedtime Use one a day or as directed , Starting Fri 8/7/2020, Until Fri 10/9/2020, Normal      levothyroxine (Synthroid) 25 mcg tablet Take 25 mcg by mouth daily , Starting Sun 9/1/2019, Historical Med      OneTouch Delica Lancets 35V MISC Test 4 times per day , Normal      ONETOUCH VERIO test strip Test 4 times per day , Normal      Prenatal Vit-Fe Fumarate-FA (PRENATAL VITAMINS PLUS PO) Take 1 tablet by mouth daily, Historical Med      witch hazel-glycerin (TUCKS) topical pad Apply 1 pad topically every 2 (two) hours as needed for irritation, Starting Mon 10/12/2020, No Print           No discharge procedures on file  PDMP Review     None           ED Provider  Attending physically available and evaluated [facility] SERVICE  I managed the patient along with the ED Attending      Electronically Signed by         Dot Manzo MD  08/28/21 2694

## 2021-09-11 NOTE — ED ATTENDING ATTESTATION
8/28/2021  IHalle DO, saw and evaluated the patient  I have discussed the patient with the resident/non-physician practitioner and agree with the resident's/non-physician practitioner's findings, Plan of Care, and MDM as documented in the resident's/non-physician practitioner's note, except where noted  All available labs and Radiology studies were reviewed  I was present for key portions of any procedure(s) performed by the resident/non-physician practitioner and I was immediately available to provide assistance  At this point I agree with the current assessment done in the Emergency Department  I have conducted an independent evaluation of this patient a history and physical is as follows:    28 yof with sore throat  Ear itching  Feels like previous ear infections  No fevers  noother c/o  Does want a covid test  Normal exam, tms clear  Lungs clear   Plan decadron, covid test    ED Course         Critical Care Time  Procedures

## 2022-01-27 ENCOUNTER — OFFICE VISIT (OUTPATIENT)
Dept: DERMATOLOGY | Facility: CLINIC | Age: 37
End: 2022-01-27
Payer: COMMERCIAL

## 2022-01-27 VITALS — WEIGHT: 179 LBS | BODY MASS INDEX: 36.08 KG/M2 | TEMPERATURE: 98 F | HEIGHT: 59 IN

## 2022-01-27 DIAGNOSIS — L81.0 POST-INFLAMMATORY HYPERPIGMENTATION: ICD-10-CM

## 2022-01-27 DIAGNOSIS — D22.60 MULTIPLE BENIGN MELANOCYTIC NEVI OF UPPER EXTREMITY, LOWER EXTREMITY, AND TRUNK: ICD-10-CM

## 2022-01-27 DIAGNOSIS — D48.5 NEOPLASM OF UNCERTAIN BEHAVIOR OF SKIN: Primary | ICD-10-CM

## 2022-01-27 DIAGNOSIS — D22.5 MULTIPLE BENIGN MELANOCYTIC NEVI OF UPPER EXTREMITY, LOWER EXTREMITY, AND TRUNK: ICD-10-CM

## 2022-01-27 DIAGNOSIS — D22.70 MULTIPLE BENIGN MELANOCYTIC NEVI OF UPPER EXTREMITY, LOWER EXTREMITY, AND TRUNK: ICD-10-CM

## 2022-01-27 DIAGNOSIS — L81.4 LENTIGO: ICD-10-CM

## 2022-01-27 PROCEDURE — 88305 TISSUE EXAM BY PATHOLOGIST: CPT | Performed by: STUDENT IN AN ORGANIZED HEALTH CARE EDUCATION/TRAINING PROGRAM

## 2022-01-27 PROCEDURE — 99204 OFFICE O/P NEW MOD 45 MIN: CPT | Performed by: DERMATOLOGY

## 2022-01-27 PROCEDURE — 11102 TANGNTL BX SKIN SINGLE LES: CPT | Performed by: DERMATOLOGY

## 2022-01-27 NOTE — PROGRESS NOTES
[facility] Note     Patient Name: Loni Cowart  Encounter Date: 1/27/22     Have you been cared for by a [facility] in the last 3 years and, if so, which one? No    · Have you traveled outside of the [address] [place] [address] in the past 3 months or outside of the [facility] area in the last 2 weeks? No     May we call your Preferred Phone number to discuss your specific medical information? Yes     May we leave a detailed message that includes your specific medical information? Yes      Today's Chief Concerns:   Concern #1:  Skin exam   Concern #2:      Past Medical History:  Have you personally ever had or currently have any of the following? · Skin cancer (such as Melanoma, Basal Cell Carcinoma, Squamous Cell Carcinoma? (If Yes, please provide more detail)- No  · Eczema: No  · Psoriasis: No  · HIV/AIDS: No  · Hepatitis B or C: No  · Tuberculosis: No  · Systemic Immunosuppression such as Diabetes, Biologic or Immunotherapy, Chemotherapy, Organ Transplantation, Bone Marrow Transplantation (If YES, please provide more detail): No  · Radiation Treatment (If YES, please provide more detail): No  · Any other major medical conditions/concerns? 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 Staying busy    Family History:  Have any of your "first degree relatives" (parent, brother, sister, or child) had any of the following       · Skin cancer such as Melanoma or Merkel Cell Carcinoma or Pancreatic Cancer? No  · Eczema, Asthma, Hay Fever or Seasonal Allergies: No  · Psoriasis or Psoriatic Arthritis: YES, psoriasis, mother  · Do any other medical conditions seem to run in your family? If Yes, what condition and which relatives?   No    Current Medications:   (please update all dermatological medications before printing patient's AVS!)      Current Outpatient Medications:    acetaminophen (TYLENOL) 325 mg tablet, Take 2 tablets (650 mg total) by mouth every 6 (six) hours (Patient not taking: Reported on 12/14/2020), Disp: , Rfl: 0    al mag oxide-diphenhydramine-lidocaine viscous (MAGIC MOUTHWASH) 1:1:1 suspension, Swish and spit 10 mL every 4 (four) hours as needed for mouth pain or discomfort (while awake) (Patient not taking: Reported on 5/19/2021), Disp: 200 mL, Rfl: 0    Blood Glucose Monitoring Suppl (ONETOUCH VERIO FLEX SYSTEM) w/Device KIT, Test 4 times per day  (Patient not taking: Reported on 12/14/2020), Disp: 1 kit, Rfl: 0    docusate sodium (COLACE) 100 mg capsule, Take 1 capsule (100 mg total) by mouth 2 (two) times a day (Patient not taking: Reported on 10/15/2020), Disp: 10 capsule, Rfl: 0    enoxaparin (LOVENOX) 40 mg/0 4 mL, Inject 0 4 mL under the skin daily at bedtime (Patient not taking: Reported on 1/27/2022 ), Disp: , Rfl:     ibuprofen (MOTRIN) 600 mg tablet, Take 1 tablet (600 mg total) by mouth every 6 (six) hours as needed for moderate pain (Patient not taking: Reported on 10/15/2020), Disp: 30 tablet, Rfl: 0    Insulin Pen Needle 31G X 5 MM MISC, Inject under the skin daily at bedtime Use one a day or as directed , Disp: 100 each, Rfl: 1    levothyroxine (Synthroid) 25 mcg tablet, Take 25 mcg by mouth daily  (Patient not taking: Reported on 1/27/2022 ), Disp: , Rfl:     OneTouch Delica Lancets 09P MISC, Test 4 times per day  (Patient not taking: Reported on 10/15/2020), Disp: 100 each, Rfl: 6    ONETOUCH VERIO test strip, Test 4 times per day   (Patient not taking: Reported on 10/15/2020), Disp: 100 each, Rfl: 6    Prenatal Vit-Fe Fumarate-FA (PRENATAL VITAMINS PLUS PO), Take 1 tablet by mouth daily (Patient not taking: Reported on 1/27/2022 ), Disp: , Rfl:     witch hazel-glycerin (TUCKS) topical pad, Apply 1 pad topically every 2 (two) hours as needed for irritation (Patient not taking: Reported on 10/15/2020), Disp: , Rfl: 0      Review of Systems:  Have you recently had or currently have any of the following? If YES, what are you doing for the problem? · Fever, chills or unintended weight loss: No  · Sudden loss or change in your vision: No  · Nausea, vomiting or blood in your stool: No  · Painful or swollen joints: No  · Wheezing or cough: No  · Changing mole or non-healing wound: No  · Nosebleeds: No  · Excessive sweating: No  · Easy or prolonged bleeding? No  · Over the last 2 weeks, how often have you been bothered by the following problems? · Taking little interest or pleasure in doing things: 1 - Not at All  · Feeling down, depressed, or hopeless: 1 - Not at All  · Rapid heartbeat with epinephrine:  No    · FEMALES ONLY:    · Are you pregnant or planning to become pregnant? No  · Are you currently or planning to be nursing or breast feeding? No    · Any known allergies? · No Known Allergies      Physical Exam:     Was a chaperone (Derm Clinical Assistant) present throughout the entire Physical Exam? Yes     Did the Dermatology Team specifically  the patient on the importance of a Full Skin Exam to be sure that nothing is missed clinically?  Yes}  o Did the patient ultimately request or accept a Full Skin Exam?  Yes  o Did the patient specifically refuse to have the areas "under-the-bra" examined by the Dermatologist? No  o Did the patient specifically refuse to have the areas "under-the-underwear" examined by the Dermatologist? No    CONSTITUTIONAL:   Vitals:    01/27/22 1318   Temp: 98 °F (36 7 °C)   Weight: 81 2 kg (179 lb)   Height: 4' 11" (1 499 m)           PSYCH: Normal mood and affect  EYES: Normal conjunctiva  ENT: Normal lips and oral mucosa  CARDIOVASCULAR: No edema  RESPIRATORY: Normal respirations  HEME/LYMPH/IMMUNO:  No regional lymphadenopathy except as noted below in "ASSESSMENT AND PLAN BY DIAGNOSIS"    SKIN:  FULL ORGAN SYSTEM EXAM   Hair, Scalp, Ears, Face Normal except as noted below in Assessment   Neck, Cervical Chain Nodes Normal except as noted below in Assessment   Right Arm/Hand/Fingers Normal except as noted below in Assessment   Left Arm/Hand/Fingers Normal except as noted below in Assessment   Chest/Breasts/Axillae Viewed areas Normal except as noted below in Assessment   Abdomen, Umbilicus Normal except as noted below in Assessment   Back/Spine Normal except as noted below in Assessment   Groin/Genitalia/Buttocks Normal except as noted below in Assessment   Right Leg, Foot, Toes Normal except as noted below in Assessment   Left Leg, Foot, Toes Normal except as noted below in Assessment        Assessment and Plan by Diagnosis:    History of Present Condition:     Duration:  How long has this been an issue for you?    o  routine skin exam family with history of melanoma    Location Affected:  Where on the body is this affecting you?    o  none at the moment   Quality:  Is there any bleeding, pain, itch, burning/irritation, or redness associated with the skin lesion?    o  n/a   Severity:  Describe any bleeding, pain, itch, burning/irritation, or redness on a scale of 1 to 10 (with 10 being the worst)    o  n/a   Timing:  Does this condition seem to be there pretty constantly or do you notice it more at specific times throughout the day?    o  n/a   Context:  Have you ever noticed that this condition seems to be associated with specific activities you do?    o  n/a   Modifying Factors:    o Anything that seems to make the condition worse?    -  n/a  o What have you tried to do to make the condition better?    -  n/a   Associated Signs and Symptoms:  Does this skin lesion seem to be associated with any of the following:  o     MELANOCYTIC NEVI ("Moles")    Physical Exam:   Anatomic Location Affected:   Mostly on sun-exposed areas of the trunk and extremities   Morphological Description:  Scattered, 1-4mm round to ovoid, symmetrical-appearing, even bordered, skin colored to dark brown macules/papules, mostly in sun-exposed areas   Pertinent Positives:   Pertinent Negatives: Additional History of Present Condition:      Assessment and Plan:  Based on a thorough discussion of this condition and the management approach to it (including a comprehensive discussion of the known risks, side effects and potential benefits of treatment), the patient (family) agrees to implement the following specific plan:   When outside we recommend using a wide brim hat, sunglasses, long sleeve and pants, sunscreen with SPF 03+ with reapplication every 2 hours, or SPF specific clothing    Benign, reassured   Annual skin check     Melanocytic Nevi  Melanocytic nevi ("moles") are tan or brown, raised or flat areas of the skin which have an increased number of melanocytes  Melanocytes are the cells in our body which make pigment and account for skin color  Some moles are present at birth (I e , "congenital nevi"), while others come up later in life (i e , "acquired nevi")  The sun can stimulate the body to make more moles  Sunburns are not the only thing that triggers more moles  Chronic sun exposure can do it too  Clinically distinguishing a healthy mole from melanoma may be difficult, even for experienced dermatologists  The "ABCDE's" of moles have been suggested as a means of helping to alert a person to a suspicious mole and the possible increased risk of melanoma  The suggestions for raising alert are as follows:    Asymmetry: Healthy moles tend to be symmetric, while melanomas are often asymmetric  Asymmetry means if you draw a line through the mole, the two halves do not match in color, size, shape, or surface texture  Asymmetry can be a result of rapid enlargement of a mole, the development of a raised area on a previously flat lesion, scaling, ulceration, bleeding or scabbing within the mole    Any mole that starts to demonstrate "asymmetry" should be examined promptly by a board certified dermatologist  Border: Healthy moles tend to have discrete, even borders  The border of a melanoma often blends into the normal skin and does not sharply delineate the mole from normal skin  Any mole that starts to demonstrate "uneven borders" should be examined promptly by a board certified dermatologist      Color: Healthy moles tend to be one color throughout  Melanomas tend to be made up of different colors ranging from dark black, blue, white, or red  Any mole that demonstrates a color change should be examined promptly by a board certified dermatologist      Diameter: Healthy moles tend to be smaller than 0 6 cm in size; an exception are "congenital nevi" that can be larger  Melanomas tend to grow and can often be greater than 0 6 cm (1/4 of an inch, or the size of a pencil eraser)  This is only a guideline, and many normal moles may be larger than 0 6 cm without being unhealthy  Any mole that starts to change in size (small to bigger or bigger to smaller) should be examined promptly by a board certified dermatologist      Evolving: Healthy moles tend to "stay the same "  Melanomas may often show signs of change or evolution such as a change in size, shape, color, or elevation  Any mole that starts to itch, bleed, crust, burn, hurt, or ulcerate or demonstrate a change or evolution should be examined promptly by a board certified dermatologist         LENTIGO    Physical Exam:   Anatomic Location Affected:  arms   Morphological Description:  Light brown macules   Pertinent Positives:   Pertinent Negatives:     Additional History of Present Condition:      Assessment and Plan:  Based on a thorough discussion of this condition and the management approach to it (including a comprehensive discussion of the known risks, side effects and potential benefits of treatment), the patient (family) agrees to implement the following specific plan:   When outside we recommend using a wide brim hat, sunglasses, long sleeve and pants, sunscreen with SPF 77+ with reapplication every 2 hours, or SPF specific clothing       What is a lentigo? A lentigo is a pigmented flat or slightly raised lesion with a clearly defined edge  Unlike an ephelis (freckle), it does not fade in the winter months  There are several kinds of lentigo  The name lentigo originally referred to its appearance resembling a small lentil  The plural of lentigo is lentigines, although lentigos is also in common use  Who gets lentigines? Lentigines can affect males and females of all ages and races  Solar lentigines are especially prevalent in fair skinned adults  Lentigines associated with syndromes are present at birth or arise during childhood  What causes lentigines? Common forms of lentigo are due to exposure to ultraviolet radiation:   Sun damage including sunburn    Indoor tanning    Phototherapy, especially photochemotherapy (PUVA)    Ionizing radiation, eg radiation therapy, can also cause lentigines  Several familial syndromes associated with widespread lentigines originate from mutations in [place]-MAP kinase, mTOR signaling and PTEN pathways  What is the treatment for lentigines? Most lentigines are left alone  Attempts to lighten them may not be successful  The following approaches are used:   SPF 50+ broad-spectrum sunscreen    Hydroquinone bleaching cream    Alpha hydroxy acids    Vitamin C    Retinoids    Azelaic acid    Chemical peels  Individual lesions can be permanently removed using:   Cryotherapy    Intense pulsed light    Pigment lasers    How can lentigines be prevented? Lentigines associated with exposure ultraviolet radiation can be prevented by very careful sun protection  Clothing is more successful at preventing new lentigines than are sunscreens  What is the outlook for lentigines? Lentigines usually persist  They may increase in number with age and sun exposure   Some in sun-protected sites may fade and disappear  NEOPLASM OF UNCERTAIN BEHAVIOR OF SKIN    Physical Exam:   (Anatomic Location); (Size and Morphological Description); (Differential Diagnosis):  o A: right upper lateral abdomen; 1 2 cm dark brown and tan plaque; (Differential Diagnosis): Congenital Nevus rule out atypia   Pertinent Positives:   Pertinent Negatives: Additional History of Present Condition:  Family history of melanoma    Assessment and Plan:   I have discussed with the patient that a sample of skin via a "skin biopsy would be potentially helpful to further make a specific diagnosis under the microscope   Based on a thorough discussion of this condition and the management approach to it (including a comprehensive discussion of the known risks, side effects and potential benefits of treatment), the patient (family) agrees to implement the following specific plan:    o Procedure:  Skin Biopsy  After a thorough discussion of treatment options and risk/benefits/alternatives (including but not limited to local pain, scarring, dyspigmentation, blistering, possible superinfection, and inability to confirm a diagnosis via histopathology), verbal and written consent were obtained and portion of the rash was biopsied for tissue sample  See below for consent that was obtained from patient and subsequent Procedure Note  PROCEDURE SHAVE BIOPSY NOTE:     Performing Physician: Yenni Villegas Anatomic Location; Clinical Description with size (cm); Pre-Op Diagnosis:   o A: right upper lateral abdomen; 1 2 cm dark brown and tan plaque; (Differential Diagnosis): Congenital Nevus rule out atypia   Post-op diagnosis: Same      Local anesthesia: 1% xylocaine with epi       Topical anesthesia: None     Hemostasis: Aluminum chloride       After obtaining informed consent  at which time there was a discussion about the purpose of biopsy  and low risks of infection and bleeding  The area was prepped and draped in the usual fashion  Anesthesia was obtained with 1% lidocaine with epinephrine  A shave biopsy to an appropriate sampling depth was obtained with a sterile blade (such as a 15-blade or DermaBlade)  The resulting wound was covered with surgical ointment and bandaged appropriately  The patient tolerated the procedure well without complications and was without signs of functional compromise  Specimen has been sent for review by Dermatopathology  Standard post-procedure care has been explained and has been included in written form within the patient's copy of Informed Consent  INFORMED CONSENT DISCUSSION AND POST-OPERATIVE INSTRUCTIONS FOR PATIENT    I   RATIONALE FOR PROCEDURE  I understand that a skin biopsy allows the Dermatologist to test a lesion or rash under the microscope to obtain a diagnosis  It usually involves numbing the area with numbing medication and removing a small piece of skin; sometimes the area will be closed with sutures  In this specific procedure, sutures are not usually needed  If any sutures are placed, then they are usually need to be removed in 2 weeks or less  I understand that my Dermatologist recommends that a skin "shave" biopsy be performed today  A local anesthetic, similar to the kind that a dentist uses when filling a cavity, will be injected with a very small needle into the skin area to be sampled  The injected skin and tissue underneath "will go to sleep and become numb so no pain should be felt afterwards  An instrument shaped like a tiny "razor blade" (shave biopsy instrument) will be used to cut a small piece of tissue and skin from the area so that a sample of tissue can be taken and examined more closely under the microscope  A slight amount of bleeding will occur, but it will be stopped with direct pressure and a pressure bandage and any other appropriate methods  I understands that a scar will form where the wound was created    Surgical ointment will be applied to help protect the wound  Sutures are not usually needed  II   RISKS AND POTENTIAL COMPLICATIONS   I understand the risks and potential complications of a skin biopsy include but are not limited to the following:   Bleeding   Infection   Pain   Scar/keloid   Skin discoloration   Incomplete Removal   Recurrence   Nerve Damage/Numbness/Loss of Function   Allergic Reaction to Anesthesia   Biopsies are diagnostic procedures and based on findings additional treatment or evaluation may be required   Loss or destruction of specimen resulting in no additional findings    My Dermatologist has explained to me the nature of the condition, the nature of the procedure, and the benefits to be reasonably expected compared with alternative approaches  My Dermatologist has discussed the likelihood of major risks or complications of this procedure including the specific risks listed above, such as bleeding, infection, and scarring/keloid  I understand that a scar is expected after this procedure  I understand that my physician cannot predict if the scar will form a "keloid," which extends beyond the borders of the wound that is created  A keloid is a thick, painful, and bumpy scar  A keloid can be difficult to treat, as it does not always respond well to therapy, which includes injecting cortisone directly into the keloid every few weeks  While this usually reduces the pain and size of the scar, it does not eliminate it  I understand that photographs may be taken before and after the procedure  These will be maintained as part of the medical providers confidential records and may not be made available to me  I further authorize the medical provider to use the photographs for teaching purposes or to illustrate scientific papers, books, or lectures if in his/her judgment, medical research, education, or science may benefit from its use      I have had an opportunity to fully inquire about the risks and benefits of this procedure and its alternatives  I have been given ample time and opportunity to ask questions and to seek a second opinion if I wished to do so  I acknowledge that there have specifically been no guarantees as to the cosmetic results from the procedure  I am aware that with any procedure there is always the possibility of an unexpected complication  III  POST-PROCEDURAL CARE (WHAT YOU WILL NEED TO DO "AFTER THE BIOPSY" TO OPTIMIZE HEALING)     Keep the area clean and dry  Try NOT to remove the bandage or get it wet for the first 24 hours   Gently clean the area and apply surgical ointment (such as Vaseline petrolatum ointment, which is available "over the counter" and not a prescription) to the biopsy site for up to 2 weeks straight  This acts to protect the wound from the outside world   Sutures are not usually placed in this procedure  If any sutures were placed, return for suture removal as instructed (generally 1 week for the face, 2 weeks for the body)   Take Acetaminophen (Tylenol) for discomfort, if no contraindications  Ibuprofen or aspirin could make bleeding worse   Call our office immediately for signs of infection: fever, chills, increased redness, warmth, tenderness, discomfort/pain, or pus or foul smell coming from the wound  WHAT TO DO IF THERE IS ANY BLEEDING? If a small amount of bleeding is noticed, place a clean cloth over the area and apply firm pressure for ten minutes  Check the wound after 10 minutes of direct pressure  If bleeding persists, try one more time for an additional 10 minutes of direct pressure on the area  If the bleeding becomes heavier or does not stop after the second attempt, or if you have any other questions about this procedure, then please call your [facility] by calling [phone] (SKIN)       I hereby acknowledge that I have reviewed and verified the site with my Dermatologist and have requested and authorized my Dermatologist to proceed with the procedure  POST-INFLAMMATORY HYPERPIGMENTATION ("PIH")    Physical Exam:   Anatomic Location Affected: Angles of the mouth   Morphological Description:  Light brown patches   Pertinent Positives:   Pertinent Negatives: Additional History of Present Condition:  Had a lot of acne    Assessment and Plan:  Based on a thorough discussion of this condition and the management approach to it (including a comprehensive discussion of the known risks, side effects and potential benefits of treatment), the patient (family) agrees to implement the following specific plan:   Hydrocortisone 2 5% cream apply topically to the angles of the mouth 2 times a day for 2 months  Assessment and Plan:Assessment and Plan  Post-inflammatory hyperpigmentation (PIH) is a temporary darkening of the skin that follows injury such as a cut or burn, or inflammation of the skin following an infection or rash  It can occur in anyone, but most commonly affects darker skin types with greater frequency and severity  Many types of inflammatory skin diseases and injuries can cause PIH, but the most common ones are acne vulgaris, atopic dermatitis, and impetigo  It is due to an overproduction of melanin and uneven transfer of pigment to surrounding keratinocytes  The exact mechanism is unknown, but it is shown to be stimulated by prostanoids, cytokines, chemokines, and other inflammatory mediators  Some medications may also darken postinflammatory pigmentation such as antimalarial drugs, clofazimine, tetracycline, anticancer drugs such as bleomycin, doxorubicin, 5-fluorouracil and busulfan  Postinflammatory hyperpigmented patches are located at the site of original inflammation after the original condition has healed or resolved  They range from light brown to black in color and may become darker if exposed to sunlight and other sources of UV rays   Hyperpigmentation in the dermis has blue-grey appearance and may be permanent or resolve over a protracted period of time if left untreated  The management of PIH should begin by first addressing the underlying inflammatory skin condition  It is important to be mindful that the treatment itself may exacerbate PIH by causing irritation   Treatments include the following:    At least three times a day application of SPF 82+ broad-spectrum sunscreen    Topical depigmenting agents such as hydroxyquinone, azelic acid, vitamin C cream, corticosteroid cream, kojic acid, licorice extract, and retinoids    Chemical peels   Laser treatments and intense pulsed light therapies for epidermal pigmentation can be used with higher risk of aggravating hyperpigmentation       Scribe Attestation    I,:  Giovanna Gambino am acting as a scribe while in the presence of the attending physician :       I,:  Chauncey Beltran MD personally performed the services described in this documentation    as scribed in my presence :

## 2022-01-27 NOTE — PATIENT INSTRUCTIONS
MELANOCYTIC NEVI ("Moles")    Physical Exam:   Anatomic Location Affected:   Mostly on sun-exposed areas of the trunk and extremities   Morphological Description:  Scattered, 1-4mm round to ovoid, symmetrical-appearing, even bordered, skin colored to dark brown macules/papules, mostly in sun-exposed areas   Pertinent Positives:   Pertinent Negatives: Additional History of Present Condition:      Assessment and Plan:  Based on a thorough discussion of this condition and the management approach to it (including a comprehensive discussion of the known risks, side effects and potential benefits of treatment), the patient (family) agrees to implement the following specific plan:   When outside we recommend using a wide brim hat, sunglasses, long sleeve and pants, sunscreen with SPF 34+ with reapplication every 2 hours, or SPF specific clothing    Benign, reassured   Annual skin check     Melanocytic Nevi  Melanocytic nevi ("moles") are tan or brown, raised or flat areas of the skin which have an increased number of melanocytes  Melanocytes are the cells in our body which make pigment and account for skin color  Some moles are present at birth (I e , "congenital nevi"), while others come up later in life (i e , "acquired nevi")  The sun can stimulate the body to make more moles  Sunburns are not the only thing that triggers more moles  Chronic sun exposure can do it too  Clinically distinguishing a healthy mole from melanoma may be difficult, even for experienced dermatologists  The "ABCDE's" of moles have been suggested as a means of helping to alert a person to a suspicious mole and the possible increased risk of melanoma  The suggestions for raising alert are as follows:    Asymmetry: Healthy moles tend to be symmetric, while melanomas are often asymmetric  Asymmetry means if you draw a line through the mole, the two halves do not match in color, size, shape, or surface texture   Asymmetry can be a result of rapid enlargement of a mole, the development of a raised area on a previously flat lesion, scaling, ulceration, bleeding or scabbing within the mole  Any mole that starts to demonstrate "asymmetry" should be examined promptly by a board certified dermatologist      Border: Healthy moles tend to have discrete, even borders  The border of a melanoma often blends into the normal skin and does not sharply delineate the mole from normal skin  Any mole that starts to demonstrate "uneven borders" should be examined promptly by a board certified dermatologist      Color: Healthy moles tend to be one color throughout  Melanomas tend to be made up of different colors ranging from dark black, blue, white, or red  Any mole that demonstrates a color change should be examined promptly by a board certified dermatologist      Diameter: Healthy moles tend to be smaller than 0 6 cm in size; an exception are "congenital nevi" that can be larger  Melanomas tend to grow and can often be greater than 0 6 cm (1/4 of an inch, or the size of a pencil eraser)  This is only a guideline, and many normal moles may be larger than 0 6 cm without being unhealthy  Any mole that starts to change in size (small to bigger or bigger to smaller) should be examined promptly by a board certified dermatologist      Evolving: Healthy moles tend to "stay the same "  Melanomas may often show signs of change or evolution such as a change in size, shape, color, or elevation  Any mole that starts to itch, bleed, crust, burn, hurt, or ulcerate or demonstrate a change or evolution should be examined promptly by a board certified dermatologist         LENTIGO    Physical Exam:   Anatomic Location Affected:  arms   Morphological Description:  Light brown macules   Pertinent Positives:   Pertinent Negatives:     Additional History of Present Condition:      Assessment and Plan:  Based on a thorough discussion of this condition and the management approach to it (including a comprehensive discussion of the known risks, side effects and potential benefits of treatment), the patient (family) agrees to implement the following specific plan:   When outside we recommend using a wide brim hat, sunglasses, long sleeve and pants, sunscreen with SPF 71+ with reapplication every 2 hours, or SPF specific clothing       What is a lentigo? A lentigo is a pigmented flat or slightly raised lesion with a clearly defined edge  Unlike an ephelis (freckle), it does not fade in the winter months  There are several kinds of lentigo  The name lentigo originally referred to its appearance resembling a small lentil  The plural of lentigo is lentigines, although lentigos is also in common use  Who gets lentigines? Lentigines can affect males and females of all ages and races  Solar lentigines are especially prevalent in fair skinned adults  Lentigines associated with syndromes are present at birth or arise during childhood  What causes lentigines? Common forms of lentigo are due to exposure to ultraviolet radiation:   Sun damage including sunburn    Indoor tanning    Phototherapy, especially photochemotherapy (PUVA)    Ionizing radiation, eg radiation therapy, can also cause lentigines  Several familial syndromes associated with widespread lentigines originate from mutations in [place]-MAP kinase, mTOR signaling and PTEN pathways  What is the treatment for lentigines? Most lentigines are left alone  Attempts to lighten them may not be successful  The following approaches are used:   SPF 50+ broad-spectrum sunscreen    Hydroquinone bleaching cream    Alpha hydroxy acids    Vitamin C    Retinoids    Azelaic acid    Chemical peels  Individual lesions can be permanently removed using:   Cryotherapy    Intense pulsed light    Pigment lasers    How can lentigines be prevented?   Lentigines associated with exposure ultraviolet radiation can be prevented by very careful sun protection  Clothing is more successful at preventing new lentigines than are sunscreens  What is the outlook for lentigines? Lentigines usually persist  They may increase in number with age and sun exposure  Some in sun-protected sites may fade and disappear  NEOPLASM OF UNCERTAIN BEHAVIOR OF SKIN    Physical Exam:   (Anatomic Location); (Size and Morphological Description); (Differential Diagnosis):  o A: right upper lateral abdomen; 1 2 cm dark brown and tan plaque; (Differential Diagnosis): Congenital Nevus rule out atypia   Pertinent Positives:   Pertinent Negatives: Additional History of Present Condition:  Family history of melanoma    Assessment and Plan:   I have discussed with the patient that a sample of skin via a "skin biopsy would be potentially helpful to further make a specific diagnosis under the microscope   Based on a thorough discussion of this condition and the management approach to it (including a comprehensive discussion of the known risks, side effects and potential benefits of treatment), the patient (family) agrees to implement the following specific plan:    o Procedure:  Skin Biopsy  After a thorough discussion of treatment options and risk/benefits/alternatives (including but not limited to local pain, scarring, dyspigmentation, blistering, possible superinfection, and inability to confirm a diagnosis via histopathology), verbal and written consent were obtained and portion of the rash was biopsied for tissue sample  See below for consent that was obtained from patient and subsequent Procedure Note  PROCEDURE SHAVE BIOPSY NOTE:     Performing Physician: Giorgio Conway Anatomic Location; Clinical Description with size (cm); Pre-Op Diagnosis:   o A: right upper lateral abdomen; 1 2 cm dark brown and tan plaque; (Differential Diagnosis): Congenital Nevus rule out atypia     Post-op diagnosis: Same      Local anesthesia: 1% xylocaine with epi  Topical anesthesia: None     Hemostasis: Aluminum chloride       After obtaining informed consent  at which time there was a discussion about the purpose of biopsy  and low risks of infection and bleeding  The area was prepped and draped in the usual fashion  Anesthesia was obtained with 1% lidocaine with epinephrine  A shave biopsy to an appropriate sampling depth was obtained with a sterile blade (such as a 15-blade or DermaBlade)  The resulting wound was covered with surgical ointment and bandaged appropriately  The patient tolerated the procedure well without complications and was without signs of functional compromise  Specimen has been sent for review by Dermatopathology  Standard post-procedure care has been explained and has been included in written form within the patient's copy of Informed Consent  INFORMED CONSENT DISCUSSION AND POST-OPERATIVE INSTRUCTIONS FOR PATIENT    I   RATIONALE FOR PROCEDURE  I understand that a skin biopsy allows the Dermatologist to test a lesion or rash under the microscope to obtain a diagnosis  It usually involves numbing the area with numbing medication and removing a small piece of skin; sometimes the area will be closed with sutures  In this specific procedure, sutures are not usually needed  If any sutures are placed, then they are usually need to be removed in 2 weeks or less  I understand that my Dermatologist recommends that a skin "shave" biopsy be performed today  A local anesthetic, similar to the kind that a dentist uses when filling a cavity, will be injected with a very small needle into the skin area to be sampled  The injected skin and tissue underneath "will go to sleep and become numb so no pain should be felt afterwards    An instrument shaped like a tiny "razor blade" (shave biopsy instrument) will be used to cut a small piece of tissue and skin from the area so that a sample of tissue can be taken and examined more closely under the microscope  A slight amount of bleeding will occur, but it will be stopped with direct pressure and a pressure bandage and any other appropriate methods  I understands that a scar will form where the wound was created  Surgical ointment will be applied to help protect the wound  Sutures are not usually needed  II   RISKS AND POTENTIAL COMPLICATIONS   I understand the risks and potential complications of a skin biopsy include but are not limited to the following:   Bleeding   Infection   Pain   Scar/keloid   Skin discoloration   Incomplete Removal   Recurrence   Nerve Damage/Numbness/Loss of Function   Allergic Reaction to Anesthesia   Biopsies are diagnostic procedures and based on findings additional treatment or evaluation may be required   Loss or destruction of specimen resulting in no additional findings    My Dermatologist has explained to me the nature of the condition, the nature of the procedure, and the benefits to be reasonably expected compared with alternative approaches  My Dermatologist has discussed the likelihood of major risks or complications of this procedure including the specific risks listed above, such as bleeding, infection, and scarring/keloid  I understand that a scar is expected after this procedure  I understand that my physician cannot predict if the scar will form a "keloid," which extends beyond the borders of the wound that is created  A keloid is a thick, painful, and bumpy scar  A keloid can be difficult to treat, as it does not always respond well to therapy, which includes injecting cortisone directly into the keloid every few weeks  While this usually reduces the pain and size of the scar, it does not eliminate it  I understand that photographs may be taken before and after the procedure  These will be maintained as part of the medical providers confidential records and may not be made available to me    I further authorize the medical provider to use the photographs for teaching purposes or to illustrate scientific papers, books, or lectures if in his/her judgment, medical research, education, or science may benefit from its use  I have had an opportunity to fully inquire about the risks and benefits of this procedure and its alternatives  I have been given ample time and opportunity to ask questions and to seek a second opinion if I wished to do so  I acknowledge that there have specifically been no guarantees as to the cosmetic results from the procedure  I am aware that with any procedure there is always the possibility of an unexpected complication  III  POST-PROCEDURAL CARE (WHAT YOU WILL NEED TO DO "AFTER THE BIOPSY" TO OPTIMIZE HEALING)     Keep the area clean and dry  Try NOT to remove the bandage or get it wet for the first 24 hours   Gently clean the area and apply surgical ointment (such as Vaseline petrolatum ointment, which is available "over the counter" and not a prescription) to the biopsy site for up to 2 weeks straight  This acts to protect the wound from the outside world   Sutures are not usually placed in this procedure  If any sutures were placed, return for suture removal as instructed (generally 1 week for the face, 2 weeks for the body)   Take Acetaminophen (Tylenol) for discomfort, if no contraindications  Ibuprofen or aspirin could make bleeding worse   Call our office immediately for signs of infection: fever, chills, increased redness, warmth, tenderness, discomfort/pain, or pus or foul smell coming from the wound  WHAT TO DO IF THERE IS ANY BLEEDING? If a small amount of bleeding is noticed, place a clean cloth over the area and apply firm pressure for ten minutes  Check the wound after 10 minutes of direct pressure  If bleeding persists, try one more time for an additional 10 minutes of direct pressure on the area    If the bleeding becomes heavier or does not stop after the second attempt, or if you have any other questions about this procedure, then please call your [facility]atologist by calling [phone] (SKIN)  I hereby acknowledge that I have reviewed and verified the site with my Dermatologist and have requested and authorized my Dermatologist to proceed with the procedure  POST-INFLAMMATORY HYPERPIGMENTATION ("PIH")    Physical Exam:   Anatomic Location Affected: Angles of the mouth   Morphological Description:  Light brown patches   Pertinent Positives:   Pertinent Negatives: Additional History of Present Condition:  Had a lot of acne    Assessment and Plan:  Based on a thorough discussion of this condition and the management approach to it (including a comprehensive discussion of the known risks, side effects and potential benefits of treatment), the patient (family) agrees to implement the following specific plan:   Hydrocortisone 2 5% cream apply topically to the angles of the mouth 2 times a day for 2 months  Assessment and Plan:Assessment and Plan  Post-inflammatory hyperpigmentation (PIH) is a temporary darkening of the skin that follows injury such as a cut or burn, or inflammation of the skin following an infection or rash  It can occur in anyone, but most commonly affects darker skin types with greater frequency and severity  Many types of inflammatory skin diseases and injuries can cause PIH, but the most common ones are acne vulgaris, atopic dermatitis, and impetigo  It is due to an overproduction of melanin and uneven transfer of pigment to surrounding keratinocytes  The exact mechanism is unknown, but it is shown to be stimulated by prostanoids, cytokines, chemokines, and other inflammatory mediators  Some medications may also darken postinflammatory pigmentation such as antimalarial drugs, clofazimine, tetracycline, anticancer drugs such as bleomycin, doxorubicin, 5-fluorouracil and busulfan       Postinflammatory hyperpigmented patches are located at the site of original inflammation after the original condition has healed or resolved  They range from light brown to black in color and may become darker if exposed to sunlight and other sources of UV rays  Hyperpigmentation in the dermis has blue-grey appearance and may be permanent or resolve over a protracted period of time if left untreated  The management of PIH should begin by first addressing the underlying inflammatory skin condition  It is important to be mindful that the treatment itself may exacerbate PIH by causing irritation   Treatments include the following:    At least three times a day application of SPF 37+ broad-spectrum sunscreen    Topical depigmenting agents such as hydroxyquinone, azelic acid, vitamin C cream, corticosteroid cream, kojic acid, licorice extract, and retinoids    Chemical peels   Laser treatments and intense pulsed light therapies for epidermal pigmentation can be used with higher risk of aggravating hyperpigmentation

## 2022-02-02 NOTE — RESULT ENCOUNTER NOTE
Surgical Pathology Report                         Case: [id number]                                   Authorizing Provider: Paulino Vincent MD      Collected:           01/27/2022 1353              Ordering Location:     [facility]      Received:            01/27/2022 [address] [place] [address]                                                                       Pathologist:           Asim Christensen MD                                                           Specimen:    Skin, Other, Right upper lateral abdomen                                                 Final Diagnosis  A  Skin, right upper lateral abdomen, shave biopsy:     Lentiginous compound nevus with mild atypia; extending to the tissue edges         Electronically signed by Asim Christensen MD on 1/31/2022 at 11:25 AM    DERMATOPATHOLOGY RESULT NOTE    Results reviewed by ordering physician    Sent my chart message with results      Instructions for Clinical Derm Team:   (remember to route Result Note to appropriate staff):    None    Result & Plan by Specimen:    Specimen A: benign  Plan: reassured, benign

## 2022-02-03 DIAGNOSIS — L01.00 IMPETIGO: Primary | ICD-10-CM

## 2022-02-08 ENCOUNTER — OFFICE VISIT (OUTPATIENT)
Dept: DERMATOLOGY | Facility: CLINIC | Age: 37
End: 2022-02-08
Payer: COMMERCIAL

## 2022-02-08 DIAGNOSIS — Z51.89 VISIT FOR WOUND CHECK: Primary | ICD-10-CM

## 2022-02-08 DIAGNOSIS — T14.8XXA WOUND INFECTION: ICD-10-CM

## 2022-02-08 DIAGNOSIS — G89.18 PAIN FOLLOWING SURGERY OR PROCEDURE: Primary | ICD-10-CM

## 2022-02-08 DIAGNOSIS — L08.9 WOUND INFECTION: ICD-10-CM

## 2022-02-08 PROCEDURE — 87070 CULTURE OTHR SPECIMN AEROBIC: CPT | Performed by: DERMATOLOGY

## 2022-02-08 PROCEDURE — 87205 SMEAR GRAM STAIN: CPT | Performed by: DERMATOLOGY

## 2022-02-08 PROCEDURE — 99212 OFFICE O/P EST SF 10 MIN: CPT | Performed by: DERMATOLOGY

## 2022-02-08 RX ORDER — LIDOCAINE 50 MG/G
1 PATCH TOPICAL DAILY
Qty: 6 PATCH | Refills: 0 | Status: SHIPPED | OUTPATIENT
Start: 2022-02-08 | End: 2022-05-17 | Stop reason: ALTCHOICE

## 2022-02-08 NOTE — LETTER
February 8, 2022     Dyan Hampton  [address] [address]    Patient: Dyan Hampton   YOB: 1985   Date of Visit: 2/8/2022       To whom it may concern:    Please excuse Meaghan Luke from traveling until patient's wound is fully healed                Sincerely,        Dermatology Nurse [facility]

## 2022-02-08 NOTE — PATIENT INSTRUCTIONS
1  Pt instructed can stop using the topical Mupirocin ointment  2  Pt instructed to wash the area daily with warm, soapy water, apply a thick amount of Vaseline, and keep the area covered until fully healed  3  Pt can use prescribed Lidocaine patch for pain - Do NOT apply patch to the wound  Apply patch topically around the wound where pain is present  4  Pt instructed Dr Joanne Paul will call with the wound culture results in the next couple of days

## 2022-02-08 NOTE — PROGRESS NOTES
Patient sent my chart message that biopsy site is very painful  In picture looks clean and moist with no drainage/pus and minimal erythema  Currently using mupirocin TID    Will bring in for nurse visit for culture and evaluation and send prescription for lidocaine patch for pain relief

## 2022-02-08 NOTE — PROGRESS NOTES
WOUND CHECK    Physical Exam:   Anatomic Location Affected:  Right upper lateral abdomen   Description of wound: Healing erosion     Additional History of Present Condition:  S/P shave biopsy on 1/27/2022 bu Dr Petey Nguyễn  Pt was instructed to wash the area daily, apply Vaseline, and keep the area covered until healed  Pt messaged Dr Petey Nguyễn on 2/3 with complaints of pain and redness at the site  Dr Petey Nguyễn prescribed Mupirocin ointment for the pt to apply to the wound 3x/day  Pt then messaged Dr Petey Nguyễn again on 2/7 with continual complaints of pain and burning  Pt presents today for a wound check  Assessment and Plan:  Based on a thorough discussion of this condition and the management approach to it (including a comprehensive discussion of the known risks, side effects and potential benefits of treatment), the patient (family) agrees to implement the following specific plan:     1  Wound culture taken in office today   2  Pt instructed can stop using the topical Mupirocin ointment since it is causing the woun to burn per the  pt  3  Pt instructed to wash the area daily with warm, soapy water, apply a thick amount of Vaseline, and keep   the area covered until fully healed  4  Pt can use prescribed Lidocaine patch for pain - Do NOT apply patch to the wound  Apply patch topically around the wound where pain is present      5  Pt instructed Dr Petey Nguyễn will call with the wound culture results in the next couple of days             Scribe Attestation    I,:  Niesha Cohn RN am acting as a scribe while in the presence of the attending physician :       I,:  Ann Vazquez MD personally performed the services described in this documentation    as scribed in my presence :

## 2022-02-11 LAB
BACTERIA WND AEROBE CULT: ABNORMAL
GRAM STN SPEC: ABNORMAL

## 2022-05-05 NOTE — PROGRESS NOTES
Calling to report that patient had a temporary loss of consciousness on Tuesday night. They spoke with his cardiologist, who ordered labs and a holter monitor, and suggested that they inform his PCP. Peyman Matthew states that he is feeling fine now. She wanted to know if  had any other advice for them. Pt tolerated venofer infusion and B12 injection to L deltoid without difficulty  No s/s reaction noted  Next appt confirmed    Left ambulatory declining AVS

## 2022-05-17 ENCOUNTER — AMB VIDEO VISIT (OUTPATIENT)
Dept: OTHER | Facility: HOSPITAL | Age: 37
End: 2022-05-17

## 2022-05-17 DIAGNOSIS — U07.1 COVID-19: Primary | ICD-10-CM

## 2022-05-17 PROBLEM — D64.9 ANEMIA, UNSPECIFIED: Status: RESOLVED | Noted: 2020-07-22 | Resolved: 2022-05-17

## 2022-05-17 PROBLEM — O35.09X0 PREGNANCY COMPLICATED BY FETAL CEREBRAL VENTRICULOMEGALY: Status: RESOLVED | Noted: 2020-08-01 | Resolved: 2022-05-17

## 2022-05-17 PROBLEM — Z3A.39 39 WEEKS GESTATION OF PREGNANCY: Status: RESOLVED | Noted: 2020-10-08 | Resolved: 2022-05-17

## 2022-05-17 PROBLEM — Z3A.37 37 WEEKS GESTATION OF PREGNANCY: Status: RESOLVED | Noted: 2020-07-09 | Resolved: 2022-05-17

## 2022-05-17 PROBLEM — O35.0XX0 PREGNANCY COMPLICATED BY FETAL CEREBRAL VENTRICULOMEGALY: Status: RESOLVED | Noted: 2020-08-01 | Resolved: 2022-05-17

## 2022-05-17 PROBLEM — O09.523 MULTIGRAVIDA OF ADVANCED MATERNAL AGE IN THIRD TRIMESTER: Status: RESOLVED | Noted: 2020-07-09 | Resolved: 2022-05-17

## 2022-05-17 PROBLEM — O09.813 PREGNANCY RESULTING FROM IN VITRO FERTILIZATION IN THIRD TRIMESTER: Status: RESOLVED | Noted: 2020-07-09 | Resolved: 2022-05-17

## 2022-05-17 PROBLEM — O09.893 ENCOUNTER FOR SUPERVISION OF HIGH RISK PREGNANCY DUE TO FETAL ANOMALY, THIRD TRIMESTER: Status: RESOLVED | Noted: 2020-07-27 | Resolved: 2022-05-17

## 2022-05-17 PROBLEM — Z34.90 PREGNANCY: Status: RESOLVED | Noted: 2020-07-22 | Resolved: 2022-05-17

## 2022-05-17 PROCEDURE — ECARE PR SL URGENT CARE VIRTUAL VISIT: Performed by: PHYSICIAN ASSISTANT

## 2022-05-17 RX ORDER — BENZONATATE 200 MG/1
200 CAPSULE ORAL 3 TIMES DAILY PRN
Qty: 20 CAPSULE | Refills: 0 | Status: SHIPPED | OUTPATIENT
Start: 2022-05-17

## 2022-05-17 NOTE — PATIENT INSTRUCTIONS
Go to ER for ANY chest pain, shortness of breath, leg pain, swelling, oxygen <95% or heart rate >100 as these can be signs of a blood clot

## 2022-05-17 NOTE — CARE ANYWHERE EVISITS
Visit Summary for Shaniqua Corey - Gender: Female - Date of Birth: [id number]  Date: [id number] - Duration: 22 minutes  Patient: Shaniqua Serrano  Provider: Cassia Garcia PA-C    Patient Contact Information  Address  [address]  [place]  [phone]    Visit Topics    Triage Questions   What is your current physical address in the event of a medical emergency? Answer []  Are you allergic to any medications? Answer []  Are you now or could you be pregnant? Answer []  Do you have any immune system compromise or chronic lung   disease? Answer []  Do you have any vulnerable family members in the home (infant, pregnant, cancer, elderly)? Answer []     Conversation Transcripts  [0A][0A] [Notification] You are connected with Cassia Garcia PA-C, Urgent Care Specialist [0A][Notification] Diamante Bill is located in [place]  [0A][Notification] Diamante Bill has shared health history  Gerald Richardson  [0A]    Diagnosis  COVID-19    Procedures  Value: [id number] Code: CPT-4 UNLISTED E&M SERVICE    Medications Prescribed    No prescriptions ordered    Electronically signed by: Tamara Montiel(NPI [id number])

## 2022-05-17 NOTE — PROGRESS NOTES
Video Visit - Munir Mondragon 39 y o  female MRN: [id number]    REQUIRED DOCUMENTATION:         1  This service was provided via [employer]  2  Provider located at [address] [place], [place] [id number]  3  [facility] provider: Melissa Patricia PA-C   4  Identify all parties in room with patient during [facility] visit:  No one else  5  After connecting through [employer], patient was identified by name and date of birth  Patient was then informed that this was a Telemedicine visit and that the exam was being conducted confidentially over secure lines  My office door was closed  No one else was in the room  Patient acknowledged consent and understanding of privacy and security of the Telemedicine visit  I informed the patient that I have reviewed their record in Epic and presented the opportunity for them to ask any questions regarding the visit today  The patient agreed to participate  VITALS: Heart Rate: 88 BPM, Respiratory Rate: 18 RPM, Temperature 98 5° F, Blood Pressure Unavailable mmHg, Pulse Ox Unavailable % on RA    HPI  Pt reports she is positive for COVID  Son had it recently  Sx started last Thursday  Today feeling more tired and "lungs are itchy"  Reports increased dry cough  Thoracic back pain x 3 days, not pleuritic  Tried motrin without relief of cough  Vaccinated, not boosted  Denies SOB or CP  Denies leg pain or swelling  +Hx factor V leiden, was on lovenox when pregnant 19 mo ago  No hx DVT  Physical Exam  Constitutional:       General: She is not in acute distress  Appearance: Normal appearance  She is not toxic-appearing  HENT:      Head: Normocephalic and atraumatic  Nose: No rhinorrhea  Mouth/Throat:      Mouth: Mucous membranes are moist    Eyes:      Conjunctiva/sclera: Conjunctivae normal    Pulmonary:      Effort: Pulmonary effort is normal  No respiratory distress  Breath sounds: No wheezing (no gross audible wheeze through computer)  Musculoskeletal:      Cervical back: Normal range of motion  Skin:     Findings: No rash (on face or neck)  Neurological:      Mental Status: She is alert  Cranial Nerves: No dysarthria or facial asymmetry  Psychiatric:         Mood and Affect: Mood normal          Behavior: Behavior normal      strict ER precautions discussed several times  Diagnoses and all orders for this visit:    COVID-19  -     benzonatate (TESSALON) 200 MG capsule; Take 1 capsule (200 mg total) by mouth as needed in the morning and 1 capsule (200 mg total) as needed at noon and 1 capsule (200 mg total) as needed in the evening for cough  -     Pulse Oximeter  -     Ambulatory Referral to [facility]; Future      Patient Instructions   Go to ER for ANY chest pain, shortness of breath, leg pain, swelling, oxygen <95% or heart rate >100 as these can be signs of a blood clot

## 2022-06-08 ENCOUNTER — ANNUAL EXAM (OUTPATIENT)
Dept: OBGYN CLINIC | Facility: CLINIC | Age: 37
End: 2022-06-08
Payer: COMMERCIAL

## 2022-06-08 VITALS
BODY MASS INDEX: 35.16 KG/M2 | SYSTOLIC BLOOD PRESSURE: 114 MMHG | WEIGHT: 174.4 LBS | HEIGHT: 59 IN | DIASTOLIC BLOOD PRESSURE: 80 MMHG

## 2022-06-08 DIAGNOSIS — Z13.29 SCREENING FOR HYPOTHYROIDISM: ICD-10-CM

## 2022-06-08 DIAGNOSIS — Z13.1 SCREENING FOR DIABETES MELLITUS: Primary | ICD-10-CM

## 2022-06-08 DIAGNOSIS — Z01.419 WOMEN'S ANNUAL ROUTINE GYNECOLOGICAL EXAMINATION: ICD-10-CM

## 2022-06-08 PROCEDURE — S0612 ANNUAL GYNECOLOGICAL EXAMINA: HCPCS | Performed by: OBSTETRICS & GYNECOLOGY

## 2022-06-08 NOTE — PATIENT INSTRUCTIONS
The patient was informed of a stable gyn examination  A Pap smear was not performed  We had a discussion about diet exercise and weight loss  Will check an fasting blood sugar and a thyroid studies  She will keep me informed status of her 21month-old  She should return my office in 1 year  She will be informed results of the above-mentioned studies

## 2022-06-08 NOTE — PROGRESS NOTES
Assessment/Plan:    The patient was informed of a stable gyn examination  A Pap smear was not performed  She is concerned about her weight gain requesting a fasting blood sugar and a thyroid studies we will get these for  We had a discussion about exercise diet water intake and sleep  She is concerned about her infant is approaching 3years of age  There was some neurological problems and some brain issues particularly with the dura  The baby is making some progress but we do not have her prognosis as of his status  There check and see if this may be a genetic component  She should return my office in 1 year  Currently still using withdrawal for contraception afraid get pregnant at the present time because of her 1st child having issues  Subjective:      Patient ID: Patricia Hansen is a 39 y o  female  HPI    This is a 49-year-old female, she is a  4 para 1 with 1  section approximately 20 months ago  Her menstrual cycles are regular and predictable  Her current method of contraception includes withdrawal   Her 21year-old having some issues with problems with the brain  There was a issue of open dura which had repeat  Surgically  There is also a problem with her bowel home  The infant is being evaluated for chopped he may need more neurosurgery in the future  Overall prognosis is good  They are contemplating genetic testing  The patient  do not want any more children until they know this is not a genetic disease  The slight hint of hydrocephaly  She does not want to use any kind hormone manipulation  She is content with 4th roll  They do not want to use condoms  She is not happy with her weight  She has a dentist on the basis  She has received COVID vaccine  She is also positive for COVID last week did not require hospitalization  GM slight discomfort around the right breast   Denies any problem with her  scar  She feels safe at home    She denies any problem depression or anxiety  Patient is concerned about her weight  She is now requesting a fasting blood sugar and a thyroid study  The following portions of the patient's history were reviewed and updated as appropriate: allergies, current medications, past family history, past medical history, past social history, past surgical history and problem list     Review of Systems   Genitourinary:        The patient is not happy with her weight gain  All other systems reviewed and are negative  Objective:      /80   Ht 4' 11" (1 499 m)   Wt 79 1 kg (174 lb 6 4 oz)   LMP 05/18/2022 (Exact Date)   BMI 35 22 kg/m²          Physical Exam  Vitals reviewed  Exam conducted with a chaperone present  Constitutional:       Appearance: Normal appearance  She is obese  HENT:      Head: Normocephalic and atraumatic  Mouth/Throat:      Mouth: Mucous membranes are dry  Eyes:      Extraocular Movements: Extraocular movements intact  Pupils: Pupils are equal, round, and reactive to light  Cardiovascular:      Rate and Rhythm: Normal rate and regular rhythm  Pulses: Normal pulses  Heart sounds: Normal heart sounds  Pulmonary:      Effort: Pulmonary effort is normal       Breath sounds: Normal breath sounds  Chest:   Breasts: Breasts are symmetrical       Right: Normal  No swelling, bleeding, inverted nipple, mass, nipple discharge or skin change  Left: Normal  No swelling, bleeding, inverted nipple, mass, nipple discharge or skin change  Abdominal:      General: Abdomen is flat  A surgical scar is present  Bowel sounds are normal  There is no distension  Palpations: Abdomen is soft  There is no hepatomegaly, splenomegaly or mass  Tenderness: There is no abdominal tenderness  There is no guarding or rebound  Hernia: No hernia is present  There is no hernia in the left inguinal area or right inguinal area            Comments: Pfannenstiel skin incision from  section is well-healed  Genitourinary:     General: Normal vulva  Pubic Area: No rash or pubic lice  Labia:         Right: No rash, tenderness, lesion or injury  Left: No rash, tenderness, lesion or injury  Urethra: No prolapse, urethral pain, urethral swelling or urethral lesion  Vagina: Normal  No signs of injury and foreign body  No vaginal discharge, erythema, tenderness, bleeding, lesions or prolapsed vaginal walls  Cervix: Normal       Uterus: Normal        Adnexa: Right adnexa normal and left adnexa normal         Right: No mass, tenderness or fullness  Left: No mass, tenderness or fullness  Rectum: Normal       Comments: The external genitalia normal limits the vagina is clean the uterus is anterior normal size adnexa clear  A Pap smear was not performed  Bladder is well supported there is no evidence of prolapse  There is no cervical motion tenderness  Musculoskeletal:         General: Normal range of motion  Cervical back: Normal range of motion and neck supple  Skin:     General: Skin is warm and dry  Coloration: Skin is not jaundiced or pale  Findings: No bruising or erythema  Neurological:      General: No focal deficit present  Mental Status: She is alert and oriented to person, place, and time  Psychiatric:         Mood and Affect: Mood normal          Behavior: Behavior normal          Thought Content:  Thought content normal

## 2022-06-11 ENCOUNTER — APPOINTMENT (OUTPATIENT)
Dept: LAB | Age: 37
End: 2022-06-11
Payer: COMMERCIAL

## 2022-06-11 DIAGNOSIS — Z13.29 SCREENING FOR HYPOTHYROIDISM: ICD-10-CM

## 2022-06-11 DIAGNOSIS — Z13.1 SCREENING FOR DIABETES MELLITUS: ICD-10-CM

## 2022-06-11 LAB
GLUCOSE P FAST SERPL-MCNC: 108 MG/DL (ref 65–99)
TSH SERPL DL<=0.05 MIU/L-ACNC: 2.12 UIU/ML (ref 0.45–4.5)

## 2022-06-11 PROCEDURE — 82947 ASSAY GLUCOSE BLOOD QUANT: CPT

## 2022-06-11 PROCEDURE — 84443 ASSAY THYROID STIM HORMONE: CPT

## 2022-06-11 PROCEDURE — 36415 COLL VENOUS BLD VENIPUNCTURE: CPT

## 2022-11-15 ENCOUNTER — OFFICE VISIT (OUTPATIENT)
Dept: OBGYN CLINIC | Facility: CLINIC | Age: 37
End: 2022-11-15

## 2022-11-15 VITALS
BODY MASS INDEX: 36.37 KG/M2 | DIASTOLIC BLOOD PRESSURE: 76 MMHG | HEIGHT: 59 IN | SYSTOLIC BLOOD PRESSURE: 104 MMHG | WEIGHT: 180.4 LBS

## 2022-11-15 DIAGNOSIS — M94.0 COSTOCHONDRITIS: Primary | ICD-10-CM

## 2022-11-15 NOTE — PATIENT INSTRUCTIONS
The patient was informed of negative finding for breast mass or breast mastalgia  We did show evidence of demonstrating pain of the chest wall  This is at the [facility] the chest sick  To the nipple at the 1 o'clock position on the chest wall  She she has been instructed to take Motrin t i d  for the next 3 days call me on Thursday with a progress report  I do not think an ultrasound will mammogram helps this point

## 2022-11-15 NOTE — PROGRESS NOTES
This is a 59-year-old female who is a  4 para 1 with 1  section  She is no longer nursing  She is now complaining proximally 2 week duration all which she thinks might be breast pain on the right side  Examination shows the right breast to be benign  When  breast tissue from the chest wall it is actually intercostal pain that she is suffering from  There are no masses in the breast the axilla clear bilaterally  The pain was able to be be reproduce without effort every time patient on the chest wall  This is consistent with chondritis intercostal   The patient now been instructed to take Motrin t i d  for the next 3 days she will call me on Thursday the  with a progress report  I am confident that there is no problem with the breast tissue itself

## 2023-05-01 ENCOUNTER — OFFICE VISIT (OUTPATIENT)
Dept: URGENT CARE | Age: 38
End: 2023-05-01

## 2023-05-01 VITALS
SYSTOLIC BLOOD PRESSURE: 131 MMHG | RESPIRATION RATE: 12 BRPM | TEMPERATURE: 98.3 F | DIASTOLIC BLOOD PRESSURE: 63 MMHG | OXYGEN SATURATION: 97 % | HEART RATE: 88 BPM

## 2023-05-01 DIAGNOSIS — R89.9 ABNORMAL LABORATORY TEST RESULT: Primary | ICD-10-CM

## 2023-05-02 NOTE — PROGRESS NOTES
[facility]        NAME: Maddy Quintanilla is a 40 y o  female  : 1985    MRN: [id number]  DATE: May 2, 2023  TIME: 10:24 AM    Assessment and Plan   Abnormal laboratory test result [R89 9]  1  Abnormal laboratory test result              Patient Instructions       Follow up with PCP in 3-5 days  Proceed to  ER if symptoms worsen  Chief Complaint     Chief Complaint   Patient presents with    Abnormal Lab results     Had recent lab work done at [facility] and was recommended to be seen at an Urgent Care and recommended to repeat CRP         History of Present Illness       Patient had recent lab work done which she states was self ordered and was contacted by a physician from [facility] stating that her CRP was elevated and that she should get the lab rechecked urgently have it repeated  Patient denies any current symptoms  Patient does not have a current PCP  Review of Systems   Review of Systems   Constitutional: Negative  HENT: Negative  Eyes: Negative  Respiratory: Negative  Cardiovascular: Negative  Gastrointestinal: Negative  Musculoskeletal: Negative  Neurological: Negative  Hematological: Negative  Current Medications       Current Outpatient Medications:     acetaminophen (TYLENOL) 325 mg tablet, Take 2 tablets (650 mg total) by mouth every 6 (six) hours (Patient not taking: No sig reported), Disp: , Rfl: 0    benzonatate (TESSALON) 200 MG capsule, Take 1 capsule (200 mg total) by mouth as needed in the morning and 1 capsule (200 mg total) as needed at noon and 1 capsule (200 mg total) as needed in the evening for cough   (Patient not taking: No sig reported), Disp: 20 capsule, Rfl: 0    ibuprofen (MOTRIN) 600 mg tablet, Take 1 tablet (600 mg total) by mouth every 6 (six) hours as needed for moderate pain (Patient not taking: No sig reported), Disp: 30 tablet, Rfl: 0    Multiple Vitamin (MULTIVITAMIN ADULT PO), Take by mouth (Patient not taking: No sig reported), Disp: , Rfl:     Current Allergies     Allergies as of 2023    (No Known Allergies)            The following portions of the patient's history were reviewed and updated as appropriate: allergies, current medications, past family history, past medical history, past social history, past surgical history and problem list      Past Medical History:   Diagnosis Date    Disease of thyroid gland     Factor 5 Leiden mutation, heterozygous ([address] )     History of PCOS     MTHFR mutation     Pre-diabetes        Past Surgical History:   Procedure Laterality Date    PA  DELIVERY ONLY N/A 10/9/2020    Procedure:  SECTION (); Surgeon: Esperanza Newton MD;  Location: [facility] ;  Service: Obstetrics    REDUCTION MAMMAPLASTY      WISDOM TOOTH EXTRACTION         Family History   Problem Relation Age of Onset    Diabetes Mother     Migraines Mother     Polycystic ovary syndrome Mother     Thyroid disease Mother     Thyroid disease Sister     Diabetes Maternal Grandmother     Thyroid disease Maternal Grandmother     Thyroid disease Maternal Aunt     Melanoma Family          Medications have been verified  Objective   /63 (BP Location: Left arm, Patient Position: Sitting, Cuff Size: Large)   Pulse 88   Temp 98 3 °F (36 8 °C) (Temporal)   Resp 12   SpO2 97%   No LMP recorded  Physical Exam     Physical Exam  Vitals and nursing note reviewed  Constitutional:       General: She is not in acute distress  Appearance: Normal appearance  She is well-developed  She is not ill-appearing, toxic-appearing or diaphoretic  HENT:      Head: Normocephalic and atraumatic  Eyes:      Extraocular Movements: Extraocular movements intact  Conjunctiva/sclera: Conjunctivae normal       Pupils: Pupils are equal, round, and reactive to light  Cardiovascular:      Rate and Rhythm: Normal rate and regular rhythm     Pulmonary:      Effort: Pulmonary effort is normal  No respiratory distress  Skin:     General: Skin is warm and dry  Findings: No rash  Neurological:      General: No focal deficit present  Mental Status: She is alert and oriented to person, place, and time  Psychiatric:         Mood and Affect: Mood normal          Behavior: Behavior normal          Thought Content: Thought content normal          Judgment: Judgment normal        I discussed with the patient we are are unable to do any stat lab work and that she should follow the advice and guidance from the physician from [address] [place] [address] she spoke to who reviewed her lab work  I advised that she follow-up with a PCP for further evaluation and review of lab work  If patient needs immediate repeat of lab work she would need to go to the emergency room for the [address] [place] [address] physician who reviewed the lab work

## 2023-05-02 NOTE — PATIENT INSTRUCTIONS
Recommend following the instructions of the [address] [place] [address] laboratory physician that contacted you

## 2023-05-09 ENCOUNTER — OFFICE VISIT (OUTPATIENT)
Dept: FAMILY MEDICINE CLINIC | Facility: CLINIC | Age: 38
End: 2023-05-09

## 2023-05-09 VITALS
SYSTOLIC BLOOD PRESSURE: 110 MMHG | HEART RATE: 90 BPM | BODY MASS INDEX: 35.84 KG/M2 | HEIGHT: 59 IN | WEIGHT: 177.8 LBS | OXYGEN SATURATION: 97 % | TEMPERATURE: 97.5 F | DIASTOLIC BLOOD PRESSURE: 68 MMHG

## 2023-05-09 DIAGNOSIS — D51.8 OTHER VITAMIN B12 DEFICIENCY ANEMIAS: ICD-10-CM

## 2023-05-09 DIAGNOSIS — Z86.32 HISTORY OF GESTATIONAL DIABETES: ICD-10-CM

## 2023-05-09 DIAGNOSIS — Z00.00 ENCOUNTER FOR ANNUAL PHYSICAL EXAM: Primary | ICD-10-CM

## 2023-05-09 DIAGNOSIS — D68.51 FACTOR V LEIDEN (HCC): ICD-10-CM

## 2023-05-09 DIAGNOSIS — Z11.59 ENCOUNTER FOR HEPATITIS C SCREENING TEST FOR LOW RISK PATIENT: ICD-10-CM

## 2023-05-09 DIAGNOSIS — D50.8 IRON DEFICIENCY ANEMIA SECONDARY TO INADEQUATE DIETARY IRON INTAKE: ICD-10-CM

## 2023-05-09 NOTE — PROGRESS NOTES
[address]    NAME: Danny Yeager  AGE: 40 y o  SEX: female  : 1985     DATE: 2023     Assessment and Plan:     New patient here for annual physical  History of factor 5 leiden  FBS 1 year ago was elevated and was diagnosed with gestation diabetes during her pregnancy 2 years ago  No acute concerns  Started exercising and is now consuming a healthier diet  Will check A1c and FBW  Also recheck B12 and iron as she was previously diagnosed  UTD for age appropriate screening  F/u BW  RTC in 1 year or sooner if deemed neccessary  Problem List Items Addressed This Visit        Endocrine    Insulin controlled gestational diabetes mellitus (GDM) in third trimester       Hematopoietic and Hemostatic    Factor V Leiden ([facility] [address] )       Other    Iron deficiency anemia secondary to inadequate dietary iron intake    Relevant Orders    Iron Panel (Includes Ferritin, Iron Sat%, Iron, and TIBC)    Other vitamin B12 deficiency anemias    Relevant Orders    Vitamin B12   Other Visit Diagnoses     Encounter for annual physical exam    -  Primary    Relevant Orders    Comprehensive metabolic panel    Lipid panel    Encounter for hepatitis C screening test for low risk patient        Relevant Orders    Hepatitis C antibody          Immunizations and preventive care screenings were discussed with patient today  Appropriate education was printed on patient's after visit summary  Counseling:  Exercise: the importance of regular exercise/physical activity was discussed  Recommend exercise 3-5 times per week for at least 30 minutes  BMI Counseling: Body mass index is 35 91 kg/m²   The BMI is above normal  Nutrition recommendations include encouraging healthy choices of fruits and vegetables, limiting drinks that contain sugar, moderation in carbohydrate intake, increasing intake of lean protein, reducing intake of saturated and trans fat and reducing intake of cholesterol  Exercise recommendations include moderate physical activity 150 minutes/week  No pharmacotherapy was ordered  Patient referred to PCP  Rationale for BMI follow-up plan is due to patient being overweight or obese  Depression Screening and Follow-up Plan: Patient was screened for depression during today's encounter  They screened negative with a PHQ-2 score of 0  No follow-ups on file  Chief Complaint:     Chief Complaint   Patient presents with   • Establish Care     NP -- Est Care       History of Present Illness:     Adult Annual Physical   Patient here as new patient for a comprehensive physical exam  The patient reports no problems  Delivered her first son in  and was diagnosed with gestational diabetes, b12 deficiency, STEVAN  Was on insulin and Lovenox during her pregnancy  Last fasting glucose was 101  Told to establish care with PCP      Diet and Physical Activity  Diet/Nutrition: started to diet 2 weeks ago  Exercise: walking and exercising      Depression Screening  PHQ-2/9 Depression Screening    Little interest or pleasure in doing things: 0 - not at all  Feeling down, depressed, or hopeless: 0 - not at all  PHQ-2 Score: 0  PHQ-2 Interpretation: Negative depression screen       General Health  Sleep: gets 7-8 hours of sleep on average  Hearing: normal - bilateral   Vision: no vision problems  Dental: 5-6 moths ago   /GYN Health  Last menstrual period:   Contraceptive method: oral contraceptives    History of STDs?: no      Review of Systems:     Review of Systems   Past Medical History:     Past Medical History:   Diagnosis Date   • Disease of thyroid gland    • Factor 5 Leiden mutation, heterozygous ([address] )    • History of PCOS    • MTHFR mutation    • Pre-diabetes       Past Surgical History:     Past Surgical History:   Procedure Laterality Date   • NE  DELIVERY ONLY N/A 10/9/2020    Procedure:  SECTION (); "Surgeon: Chase Miller MD;  Location: [facility] ;  Service: Obstetrics   • REDUCTION MAMMAPLASTY     • WISDOM TOOTH EXTRACTION        Social History:     Social History     Socioeconomic History   • Marital status: /Civil Union     Spouse name: Tamika Nice   • Number of children: 1   • Years of education: None   • Highest education level: Some college, no degree   Occupational History     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   Tobacco Use   • Smoking status: Never   • Smokeless tobacco: Never   Vaping Use   • Vaping Use: Never used   Substance and Sexual Activity   • Alcohol use: No   • Drug use: No   • Sexual activity: Yes     Partners: Male     Birth control/protection: Rhythm   Other Topics Concern   • None   Social History Narrative    Uses safety equipment seatbelts     Social Determinants of Health     Financial Resource Strain: Not on file   Food Insecurity: Not on file   Transportation Needs: Not on file   Physical Activity: Not on file   Stress: Not on file   Social Connections: Not on file   Intimate Partner Violence: Not on file   Housing Stability: Not on file      Family History:     Family History   Problem Relation Age of Onset   • Diabetes Mother    • Migraines Mother    • Polycystic ovary syndrome Mother    • Thyroid disease Mother    • No Known Problems Father    • Thyroid disease Sister    • Diabetes Maternal Grandmother    • Thyroid disease Maternal Grandmother    • Thyroid disease Maternal Aunt    • Melanoma Family       Current Medications:     No current outpatient medications on file  No current facility-administered medications for this visit  Allergies:     No Known Allergies   Physical Exam:     /68 (BP Location: Left arm, Patient Position: Sitting, Cuff Size: Standard)   Pulse 90   Temp 97 5 °F (36 4 °C)   Ht 4' 11\" (1 499 m)   Wt 80 6 kg (177 lb 12 8 oz)   SpO2 97%   BMI 35 91 kg/m²     Physical Exam  Vitals reviewed     Constitutional:       General: She is " not in acute distress  Appearance: She is obese  She is not ill-appearing or toxic-appearing  HENT:      Head: Normocephalic and atraumatic  Eyes:      Extraocular Movements: Extraocular movements intact  Cardiovascular:      Rate and Rhythm: Normal rate and regular rhythm  Heart sounds: No murmur heard  Pulmonary:      Effort: Pulmonary effort is normal  No respiratory distress  Breath sounds: Normal breath sounds  No stridor  No wheezing or rales  Abdominal:      General: Abdomen is flat  There is no distension  Palpations: There is no mass  Tenderness: There is no abdominal tenderness  There is no guarding or rebound  Hernia: No hernia is present  Musculoskeletal:      Right lower leg: No edema  Left lower leg: No edema  Lymphadenopathy:      Cervical: No cervical adenopathy  Skin:     General: Skin is warm  Neurological:      General: No focal deficit present  Mental Status: She is alert  Psychiatric:         Mood and Affect: Mood normal          Behavior: Behavior normal          Thought Content:  Thought content normal           Keke Ramirez MD   [address] [facility]

## 2023-05-10 ENCOUNTER — APPOINTMENT (OUTPATIENT)
Dept: LAB | Age: 38
End: 2023-05-10

## 2023-05-10 DIAGNOSIS — Z11.59 ENCOUNTER FOR HEPATITIS C SCREENING TEST FOR LOW RISK PATIENT: ICD-10-CM

## 2023-05-10 DIAGNOSIS — Z00.00 ENCOUNTER FOR ANNUAL PHYSICAL EXAM: ICD-10-CM

## 2023-05-10 DIAGNOSIS — D51.8 OTHER VITAMIN B12 DEFICIENCY ANEMIAS: ICD-10-CM

## 2023-05-10 DIAGNOSIS — Z86.32 HISTORY OF GESTATIONAL DIABETES: ICD-10-CM

## 2023-05-10 DIAGNOSIS — D50.8 IRON DEFICIENCY ANEMIA SECONDARY TO INADEQUATE DIETARY IRON INTAKE: ICD-10-CM

## 2023-05-10 LAB
ALBUMIN SERPL BCP-MCNC: 4.1 G/DL (ref 3.5–5)
ALP SERPL-CCNC: 63 U/L (ref 46–116)
ALT SERPL W P-5'-P-CCNC: 56 U/L (ref 12–78)
ANION GAP SERPL CALCULATED.3IONS-SCNC: 4 MMOL/L (ref 4–13)
AST SERPL W P-5'-P-CCNC: 32 U/L (ref 5–45)
BILIRUB SERPL-MCNC: 0.34 MG/DL (ref 0.2–1)
BUN SERPL-MCNC: 7 MG/DL (ref 5–25)
CALCIUM SERPL-MCNC: 9.2 MG/DL (ref 8.3–10.1)
CHLORIDE SERPL-SCNC: 107 MMOL/L (ref 96–108)
CHOLEST SERPL-MCNC: 208 MG/DL
CO2 SERPL-SCNC: 23 MMOL/L (ref 21–32)
CREAT SERPL-MCNC: 0.72 MG/DL (ref 0.6–1.3)
EST. AVERAGE GLUCOSE BLD GHB EST-MCNC: 108 MG/DL
FERRITIN SERPL-MCNC: 78 NG/ML (ref 8–388)
GFR SERPL CREATININE-BSD FRML MDRD: 107 ML/MIN/1.73SQ M
GLUCOSE P FAST SERPL-MCNC: 101 MG/DL (ref 65–99)
HBA1C MFR BLD: 5.4 %
HCV AB SER QL: NORMAL
HDLC SERPL-MCNC: 40 MG/DL
IRON SATN MFR SERPL: 15 % (ref 15–50)
IRON SERPL-MCNC: 54 UG/DL (ref 50–170)
LDLC SERPL CALC-MCNC: 152 MG/DL (ref 0–100)
NONHDLC SERPL-MCNC: 168 MG/DL
POTASSIUM SERPL-SCNC: 4.3 MMOL/L (ref 3.5–5.3)
PROT SERPL-MCNC: 7.5 G/DL (ref 6.4–8.4)
SODIUM SERPL-SCNC: 134 MMOL/L (ref 135–147)
TIBC SERPL-MCNC: 364 UG/DL (ref 250–450)
TRIGL SERPL-MCNC: 81 MG/DL
VIT B12 SERPL-MCNC: 541 PG/ML (ref 100–900)

## 2023-05-17 DIAGNOSIS — E78.2 MODERATE MIXED HYPERLIPIDEMIA NOT REQUIRING STATIN THERAPY: Primary | ICD-10-CM

## 2023-07-03 ENCOUNTER — ANNUAL EXAM (OUTPATIENT)
Dept: OBGYN CLINIC | Facility: CLINIC | Age: 38
End: 2023-07-03
Payer: COMMERCIAL

## 2023-07-03 VITALS
WEIGHT: 170 LBS | SYSTOLIC BLOOD PRESSURE: 104 MMHG | HEIGHT: 59 IN | DIASTOLIC BLOOD PRESSURE: 70 MMHG | BODY MASS INDEX: 34.27 KG/M2

## 2023-07-03 DIAGNOSIS — Z01.419 WOMEN'S ANNUAL ROUTINE GYNECOLOGICAL EXAMINATION: Primary | ICD-10-CM

## 2023-07-03 PROCEDURE — S0612 ANNUAL GYNECOLOGICAL EXAMINA: HCPCS | Performed by: OBSTETRICS & GYNECOLOGY

## 2023-07-03 PROCEDURE — G0145 SCR C/V CYTO,THINLAYER,RESCR: HCPCS | Performed by: OBSTETRICS & GYNECOLOGY

## 2023-07-03 PROCEDURE — G0476 HPV COMBO ASSAY CA SCREEN: HCPCS | Performed by: OBSTETRICS & GYNECOLOGY

## 2023-07-03 NOTE — PROGRESS NOTES
Assessment/Plan:    The patient was informed of a stable GYN examination. A Pap smear was performed. She is exercise and tries to lose weight. She is lost 10 pounds in the last visit she will continue this. She is contemplating pregnancy at the end of the year. We talked about fertile cycles. She is happy with the progress of her sinus neurological deficit he is doing well. She and her  are not she had to carry as this is a rare genetic change. She should be able to have a normal job if she chooses to. She will continue to see a dentist on a regular basis. Denies any prior depression or anxiety. She will continue to lose weight. She should return my office in 1 year unless new issues occur. Subjective:      Patient ID: Kamran Luna is a 40 y.o. female. HPI    This is a 42-year-old [facility] female, she is a  4 para 1 with 1  section over 2 years ago. Her infant is doing well. He does have a genetic disorder. Affecting mostly his neurological functions. He is making progress and she is pleased. She is aware that she is not a genetic carrier. Her and her  are contemplating another pregnancy within the next year. Her menstrual cycles are regular and predictable. There is no problem with intimacy. She does have a history of Ht HFR deficiency. Hypothyroidism. Currently she is on any medication with that. She is lost over 10 pounds since her last visit. She is dieting and exercising and doing better. She denies any major gynecological  or GI complaint. Her history of rash or costochondritis is much improved. She does have occasional problem with stress urine incontinence. She is watch and after she is done childbearing we may consider urogyn evaluate elation. She will need a Pap smear today. There are no new major family illnesses to report. She sees a dentist on a regular basis. She feels safe at home.       The following portions of the patient's history were reviewed and updated as appropriate: allergies, current medications, past family history, past medical history, past social history, past surgical history and problem list.    Review of Systems   Genitourinary:        Slight stress urinary incontinence   All other systems reviewed and are negative. Objective:      /70   Ht 4' 11" (1.499 m)   Wt 77.1 kg (170 lb)   LMP 2023 (Approximate)   BMI 34.34 kg/m²          Physical Exam  Vitals reviewed. Exam conducted with a chaperone present. Constitutional:       Appearance: Normal appearance. HENT:      Head: Normocephalic. Nose: Nose normal.   Eyes:      Extraocular Movements: Extraocular movements intact. Pupils: Pupils are equal, round, and reactive to light. Cardiovascular:      Rate and Rhythm: Normal rate and regular rhythm. Pulses: Normal pulses. Pulmonary:      Effort: Pulmonary effort is normal.   Chest:   Breasts:     Breasts are symmetrical.      Right: Normal.      Left: Normal.      Comments: Evidence of bilateral breast reduction surgery over 7 years ago the scars are well-healed  Abdominal:      General: Abdomen is flat. A surgical scar is present. Palpations: Abdomen is soft. Hernia: There is no hernia in the left inguinal area or right inguinal area. Comments:  section scar well-healed x1   Genitourinary:     General: Normal vulva. Pubic Area: No rash or pubic lice. Labia:         Right: No rash, tenderness, lesion or injury. Left: No rash, tenderness, lesion or injury. Urethra: No prolapse, urethral pain, urethral swelling or urethral lesion. Vagina: Normal. No signs of injury and foreign body. No vaginal discharge, erythema, tenderness, bleeding, lesions or prolapsed vaginal walls. Cervix: Normal.      Uterus: Normal.       Adnexa: Right adnexa normal.        Right: No mass or tenderness. Left: No mass or tenderness. Rectum: Normal.      Comments: The external genitalia normal limits, the vagina is clean. Cervix is closed uterus is anterior normal size adnexa clear bilaterally. A Pap smear was performed. Is no evidence of prolapse. The urethra and bladder normal working relationship. Musculoskeletal:         General: Normal range of motion. Cervical back: Normal range of motion and neck supple. Lymphadenopathy:      Lower Body: No right inguinal adenopathy. No left inguinal adenopathy. Skin:     General: Skin is warm. Neurological:      General: No focal deficit present. Mental Status: She is alert and oriented to person, place, and time.    Psychiatric:         Mood and Affect: Mood normal.

## 2023-07-03 NOTE — PATIENT INSTRUCTIONS
The patient was informed of a stable GYN examination. She will continue to watch her menstrual cycles. She may consider pregnancy later this year. Is no problem with intimacy. A Pap smear was performed. She should return my office when she is pregnant or year what ever comes later.

## 2023-07-07 LAB
LAB AP GYN PRIMARY INTERPRETATION: NORMAL
Lab: NORMAL

## 2023-07-18 NOTE — PROGRESS NOTES
See note [facility]:  I discussed with the patient the risks of golimumab including but not limited to myelosuppression, immunosuppression, autoimmune hepatitis, demyelinating diseases, lymphoma, and serious infections.  The patient understands that monitoring is required including a PPD at baseline and must alert us or the primary physician if symptoms of infection or other concerning signs are noted.

## 2023-07-28 ENCOUNTER — OFFICE VISIT (OUTPATIENT)
Dept: FAMILY MEDICINE CLINIC | Facility: CLINIC | Age: 38
End: 2023-07-28
Payer: COMMERCIAL

## 2023-07-28 VITALS
OXYGEN SATURATION: 98 % | SYSTOLIC BLOOD PRESSURE: 120 MMHG | DIASTOLIC BLOOD PRESSURE: 60 MMHG | TEMPERATURE: 98.2 F | BODY MASS INDEX: 33.51 KG/M2 | RESPIRATION RATE: 14 BRPM | WEIGHT: 166.2 LBS | HEIGHT: 59 IN | HEART RATE: 72 BPM

## 2023-07-28 DIAGNOSIS — D68.51 FACTOR V LEIDEN (HCC): ICD-10-CM

## 2023-07-28 DIAGNOSIS — M54.6 ACUTE MIDLINE THORACIC BACK PAIN: ICD-10-CM

## 2023-07-28 DIAGNOSIS — R07.89 ATYPICAL CHEST PAIN: Primary | ICD-10-CM

## 2023-07-28 PROCEDURE — 99214 OFFICE O/P EST MOD 30 MIN: CPT | Performed by: FAMILY MEDICINE

## 2023-07-28 PROCEDURE — 93000 ELECTROCARDIOGRAM COMPLETE: CPT | Performed by: FAMILY MEDICINE

## 2023-07-28 RX ORDER — CYCLOBENZAPRINE HCL 5 MG
5 TABLET ORAL
Qty: 15 TABLET | Refills: 0 | Status: SHIPPED | OUTPATIENT
Start: 2023-07-28

## 2023-07-28 RX ORDER — MELOXICAM 7.5 MG/1
7.5 TABLET ORAL DAILY
Qty: 15 TABLET | Refills: 0 | Status: SHIPPED | OUTPATIENT
Start: 2023-07-28

## 2023-07-28 NOTE — PROGRESS NOTES
Name: Tamara Vallejo      : 1985      MRN: [id number]  Encounter Provider: Sergey Colby MD  Encounter Date: 2023   Encounter department: [address] [place] [address] with anterior upper chest pain and mid thoracic back pain for 2 weeks. EKG normal ( sinus dorothea w/ HR measuring 59, no ST abnormalities). Chest was tenderness. Likely MSK. Ordered flexeril at night and mobic once daily. Given her history of factor 5 Leiden, low suspicion for CTA to look for PE. Call precautions reviewed. Also offered support if she should need any while dealing with stress. This could also cause chest wall pain       1. Atypical chest pain  -     POCT ECG  -     meloxicam (Mobic) 7.5 mg tablet; Take 1 tablet (7.5 mg total) by mouth daily    2. Factor V Leiden ([address])    3. Acute midline thoracic back pain  -     cyclobenzaprine (FLEXERIL) 5 mg tablet; Take 1 tablet (5 mg total) by mouth daily at bedtime  -     meloxicam (Mobic) 7.5 mg tablet; Take 1 tablet (7.5 mg total) by mouth daily           Subjective      Presents with pain in the chest and back for 2 weeks. Wakes up with pain and stiffness. Difficult for her to get out of bed in the am.  will have to help her. It will continue throughout the day but is less intense. Denies swelling, rash, SOB, wheezing, cough, fever, palpitations, recent travel, or injury. Pt does admit to feeling more stressed dealing with her son's health issues. Review of Systems   Constitutional: Negative for fatigue and fever. Respiratory: Negative for cough, choking, chest tightness, shortness of breath and wheezing. Cardiovascular: Positive for chest pain. Negative for palpitations and leg swelling. Gastrointestinal: Negative. Musculoskeletal: Positive for back pain. No current outpatient medications on file prior to visit.        Objective     /60 (BP Location: Right arm, Patient Position: Sitting, Cuff Size: Standard) Pulse 72   Temp 98.2 °F (36.8 °C) (Tympanic)   Resp 14   Ht 4' 11" (1.499 m)   Wt 75.4 kg (166 lb 3.2 oz)   LMP 06/18/2023 (Approximate)   SpO2 98%   BMI 33.57 kg/m²     Physical Exam  Vitals reviewed. Constitutional:       General: She is not in acute distress. Appearance: Normal appearance. She is not ill-appearing, toxic-appearing or diaphoretic. HENT:      Head: Normocephalic and atraumatic. Cardiovascular:      Rate and Rhythm: Normal rate and regular rhythm. Heart sounds: No murmur heard. Pulmonary:      Effort: Pulmonary effort is normal. No respiratory distress. Breath sounds: Normal breath sounds. No stridor. No wheezing, rhonchi or rales. Chest:      Chest wall: Tenderness present. Neurological:      Mental Status: She is alert.        Kelly Garcia MD

## 2023-08-22 ENCOUNTER — HOSPITAL ENCOUNTER (EMERGENCY)
Facility: HOSPITAL | Age: 38
Discharge: HOME | End: 2023-08-22
Attending: EMERGENCY MEDICINE | Admitting: EMERGENCY MEDICINE
Payer: COMMERCIAL

## 2023-08-22 VITALS
SYSTOLIC BLOOD PRESSURE: 125 MMHG | DIASTOLIC BLOOD PRESSURE: 63 MMHG | TEMPERATURE: 98.1 F | OXYGEN SATURATION: 98 % | RESPIRATION RATE: 18 BRPM | HEART RATE: 84 BPM

## 2023-08-22 DIAGNOSIS — M54.30 SCIATICA, UNSPECIFIED LATERALITY: ICD-10-CM

## 2023-08-22 DIAGNOSIS — M79.604 RIGHT LEG PAIN: Primary | ICD-10-CM

## 2023-08-22 LAB — D DIMER PPP FEU-MCNC: <0.27 UG/ML FEU

## 2023-08-22 PROCEDURE — 85379 FIBRIN DEGRADATION QUANT: CPT

## 2023-08-22 PROCEDURE — 99283 EMERGENCY DEPT VISIT LOW MDM: CPT

## 2023-08-22 PROCEDURE — 99284 EMERGENCY DEPT VISIT MOD MDM: CPT | Performed by: EMERGENCY MEDICINE

## 2023-08-22 PROCEDURE — 36415 COLL VENOUS BLD VENIPUNCTURE: CPT

## 2023-08-22 RX ORDER — IBUPROFEN 400 MG/1
400 TABLET ORAL ONCE
Status: COMPLETED | OUTPATIENT
Start: 2023-08-22 | End: 2023-08-22

## 2023-08-22 RX ADMIN — IBUPROFEN 400 MG: 400 TABLET, FILM COATED ORAL at 04:45

## 2023-08-22 NOTE — DISCHARGE INSTRUCTIONS
You have been evaluated in the emergency department for leg pain. Your evaluation, including D-dimer, suggests that you do not have any blood clots causing your symptoms. Is likely that your pain is either musculoskeletal or secondary to sciatica, or nerve impingement. Use Tylenol and Motrin as needed for pain, and follow-up with orthopedic surgery if your symptoms do not improve. Return to the emergency department if your leg becomes more swollen, more red, more painful, you develop fevers, chest pain, or shortness of breath.     Please read the attached document on sciatica

## 2023-08-22 NOTE — ED PROVIDER NOTES
History  Chief Complaint   Patient presents with   • Leg Pain     Right leg pain for several days, tonight is worse. Pain runs from pelvis to foot. Patient is a 41-year-old female with a past medical history of 5 Leiden presenting with right leg pain for the past couple of weeks. Patient notes that she has not noticed any increased redness or swelling, chest pain in her legs starting at the inguinal crease traveling down her leg the distribution of her deep venous system. Patient describes the pain as sharp, started in her pelvis and going down her leg, worse with movement. Patient denies any fevers, shortness of breath, chest pain. Prior to Admission Medications   Prescriptions Last Dose Informant Patient Reported? Taking? cyclobenzaprine (FLEXERIL) 5 mg tablet   No No   Sig: Take 1 tablet (5 mg total) by mouth daily at bedtime   meloxicam (Mobic) 7.5 mg tablet   No No   Sig: Take 1 tablet (7.5 mg total) by mouth daily      Facility-Administered Medications: None       Past Medical History:   Diagnosis Date   • Disease of thyroid gland    • Factor 5 Leiden mutation, heterozygous ([address])    • History of PCOS    • MTHFR mutation    • Pre-diabetes        Past Surgical History:   Procedure Laterality Date   • NM  DELIVERY ONLY N/A 10/9/2020    Procedure:  SECTION (); Surgeon: Madison Eaton MD;  Location: [facility] ;  Service: Obstetrics   • REDUCTION MAMMAPLASTY     • WISDOM TOOTH EXTRACTION         Family History   Problem Relation Age of Onset   • Diabetes Mother    • Migraines Mother    • Polycystic ovary syndrome Mother    • Thyroid disease Mother    • No Known Problems Father    • Thyroid disease Sister    • Diabetes Maternal Grandmother    • Thyroid disease Maternal Grandmother    • Thyroid disease Maternal Aunt    • Melanoma Family      I have reviewed and agree with the history as documented.     E-Cigarette/Vaping   • E-Cigarette Use Never User      E-Cigarette/Vaping Substances   • Nicotine No    • THC No    • CBD No    • Flavoring No    • Other No    • Unknown No      Social History     Tobacco Use   • Smoking status: Never     Passive exposure: Never   • Smokeless tobacco: Never   Vaping Use   • Vaping Use: Never used   Substance Use Topics   • Alcohol use: No   • Drug use: No        Review of Systems   Constitutional: Negative for chills, fatigue and fever. HENT: Negative for rhinorrhea, sore throat and trouble swallowing. Eyes: Negative for discharge and visual disturbance. Respiratory: Negative for cough and shortness of breath. Cardiovascular: Negative for chest pain and palpitations. Gastrointestinal: Negative for abdominal pain, constipation, diarrhea, nausea and vomiting. Genitourinary: Negative for dysuria and hematuria. Musculoskeletal: Negative for arthralgias and back pain. Skin: Negative for color change and rash. Neurological: Negative for seizures and syncope. All other systems reviewed and are negative. Physical Exam  ED Triage Vitals [08/22/23 0317]   Temperature Pulse Respirations Blood Pressure SpO2   98.1 °F (36.7 °C) 84 18 125/63 98 %      Temp Source Heart Rate Source Patient Position - Orthostatic VS BP Location FiO2 (%)   Oral -- Sitting Left arm --      Pain Score       --             Orthostatic Vital Signs  Vitals:    08/22/23 0317   BP: 125/63   Pulse: 84   Patient Position - Orthostatic VS: Sitting       Physical Exam  Vitals and nursing note reviewed. Constitutional:       General: She is not in acute distress. Appearance: She is well-developed. HENT:      Head: Normocephalic and atraumatic. Eyes:      Conjunctiva/sclera: Conjunctivae normal.   Cardiovascular:      Rate and Rhythm: Normal rate and regular rhythm. Heart sounds: No murmur heard. Pulmonary:      Effort: Pulmonary effort is normal. No respiratory distress. Breath sounds: Normal breath sounds. Abdominal:      Palpations: Abdomen is soft. Tenderness: There is no abdominal tenderness. Musculoskeletal:         General: Tenderness present. No swelling. Cervical back: Neck supple. Comments: Patient is tender to palpation in her right leg along the medial aspect of her thigh    Straight leg raise test on the right positive   Skin:     General: Skin is warm and dry. Capillary Refill: Capillary refill takes less than 2 seconds. Neurological:      Mental Status: She is alert. Psychiatric:         Mood and Affect: Mood normal.         ED Medications  Medications   ibuprofen (MOTRIN) tablet 400 mg (400 mg Oral Given 8/22/23 0445)       Diagnostic Studies  Results Reviewed     Procedure Component Value Units Date/Time    D-dimer, quantitative [[id number]]  (Normal) Collected: 08/22/23 0444    Lab Status: Final result Specimen: Blood from Arm, Right Updated: 08/22/23 0509     D-Dimer, Heather Signs <0.27 ug/ml FEU                  No orders to display         Procedures  Procedures      ED Course                             SBIRT 22yo+    Flowsheet Row Most Recent Value   Initial Alcohol Screen: US AUDIT-C     1. How often do you have a drink containing alcohol? 0 Filed at: 08/22/2023 0317   2. How many drinks containing alcohol do you have on a typical day you are drinking? 0 Filed at: 08/22/2023 0317   3b. FEMALE Any Age, or MALE 65+: How often do you have 4 or more drinks on one occassion? 0 Filed at: 08/22/2023 0317   Audit-C Score 0 Filed at: 08/22/2023 2407   JAVON: How many times in the past year have you. .. Used an illegal drug or used a prescription medication for non-medical reasons?  Never Filed at: 08/22/2023 7186            Vignesh Hicks Criteria for DVT    Flowsheet Row Most Recent Value   Krishna' Criteria for DVT    Active cancer Treatment or palliation within 6 months 0 Filed at: 08/22/2023 8858   Bedridden recently >3 days or major surgery within 12 weeks 0 Filed at: 08/22/2023 0349   Calf swelling >3 cm compared to the other leg 0 Filed at: 08/22/2023 6707   Entire leg swollen 0 Filed at: 08/22/2023 6349   Collateral (nonvaricose) superficial veins present 0 Filed at: 08/22/2023 4379   Localized tenderness along the deep venous system 1 Filed at: 08/22/2023 2665   Pitting edema, confined to symptomatic leg 0 Filed at: 08/22/2023 7843   Paralysis, paresis, or recent plaster immobilization of the lower extremity 0 Filed at: 08/22/2023 5826   Previously documented DVT 0 Filed at: 08/22/2023 6862   Alternative diagnosis to DVT as likely or more likely 0 Filed at: 08/22/2023 0937   Wells DVT Critera Score 1 Filed at: 08/22/2023 8499          Medical Decision Making  Patient is a 59-year-old female with PMH of factor V deficiency who presents to the ED with leg pain. Vital signs stable. On exam no erythema or swelling, tender. History and physical exam most consistent with muscle strain. However, differential diagnosis included but not limited to sciatica, DVT. Plan D-dimer with ultrasound positive    View ED course above for further discussion on patient workup. All labs reviewed and utilized in the medical decision making process  All radiology studies independently viewed by me and interpreted by the radiologist.  I reviewed all testing with the patient. Upon re-evaluation patient's D-dimer returned negative, patient pain mildly improved with Motrin. Encouraged Motrin, Tylenol, stretching, ice and heat, and following up with orthopedic surgery if her symptoms not resolved. Return precautions, including those of DVT, discussed at length, all questions answered prior to discharge. Amount and/or Complexity of Data Reviewed  Labs: ordered. Risk  Prescription drug management.             Disposition  Final diagnoses:   Right leg pain   Sciatica, unspecified laterality     Time reflects when diagnosis was documented in both MDM as applicable and the Disposition within this note     Time User Action Codes Description Comment 8/22/2023  5:17 AM Valjean Gosselin Add [F98.218] Right leg pain     8/22/2023  5:17 AM Valjean Gosselin Add [M54.30] Sciatica, unspecified laterality       ED Disposition     ED Disposition   Discharge    Condition   Stable    Date/Time   Tue Aug 22, 2023  5:19 AM    Comment   Karmen Pike discharge to home/self care. Follow-up Information     Follow up With Specialties Details Why Contact Info Additional Information    Elizabeth Milelr MD Family Medicine   [address] [facility]  [address] [facility] [place] [address]  [phone]       [address] [place] [address] Emergency Department Emergency Medicine Go to  If symptoms worsen [address] [address]  [facility] Emergency Department, [address], [place], [facility]    [address] [place] [address] Surgery   [address] [facility] [address]  [address] [place] [address] Specialist [facility], [address] [place] [address], [id number] [phone]          Discharge Medication List as of 8/22/2023  5:20 AM      CONTINUE these medications which have NOT CHANGED    Details   cyclobenzaprine (FLEXERIL) 5 mg tablet Take 1 tablet (5 mg total) by mouth daily at bedtime, Starting Fri 7/28/2023, Normal      meloxicam (Mobic) 7.5 mg tablet Take 1 tablet (7.5 mg total) by mouth daily, Starting Fri 7/28/2023, Normal           No discharge procedures on file. PDMP Review     None           ED Provider  Attending physically available and evaluated Karmen Pike. I managed the patient along with the ED Attending.     Electronically Signed by         Latrice Mcneill MD  08/22/23 3913

## 2023-08-22 NOTE — ED ATTENDING ATTESTATION
8/22/2023  IJonatan MD, saw and evaluated the patient. I have discussed the patient with the resident/non-physician practitioner and agree with the resident's/non-physician practitioner's findings, Plan of Care, and MDM as documented in the resident's/non-physician practitioner's note, except where noted. All available labs and Radiology studies were reviewed. I was present for key portions of any procedure(s) performed by the resident/non-physician practitioner and I was immediately available to provide assistance. At this point I agree with the current assessment done in the Emergency Department. I have conducted an independent evaluation of this patient a history and physical is as follows:    ED Course  ED Course as of 08/22/23 0757   Tue Aug 22, 2023   0357 Per resident h&p 39 YO F presents with inguinal pain radiating down leg; h/o factor 5 leiden O: F in no distress I/P d-dimer if positive; duplex US of leg r/o DVT     Emergency Department Note- Ana Lopez 40 y.o. female MRN: [id number]    Unit/Bed#: [facility] Encounter: [id number]    Ana Lopez is a 40 y.o. female who presents with   Chief Complaint   Patient presents with   • Leg Pain     Right leg pain for several days, tonight is worse. Pain runs from pelvis to foot. History of Present Illness   HPI:  Ana Lopez is a 40 y.o. female who presents for evaluation of:  Right inguinal pain extending down her medial thigh to her right first toe over the last several days. The patient has a history of factor V Leiden deficiency. Patient has no history of venous thromboembolism. He has never been anticoagulated. She denies associated chest pain and dyspnea. Review of Systems   Constitutional: Negative for fatigue and fever. HENT: Negative for congestion and sore throat. Respiratory: Negative for cough and shortness of breath. Cardiovascular: Negative for chest pain and palpitations.    Gastrointestinal: Negative for abdominal pain and nausea. Genitourinary: Negative for flank pain and frequency. Neurological: Negative for light-headedness and headaches. Psychiatric/Behavioral: Negative for dysphoric mood and hallucinations. All other systems reviewed and are negative. Historical Information   Past Medical History:   Diagnosis Date   • Disease of thyroid gland    • Factor 5 Leiden mutation, heterozygous ([address])    • History of PCOS    • MTHFR mutation    • Pre-diabetes      Past Surgical History:   Procedure Laterality Date   • MS  DELIVERY ONLY N/A 10/9/2020    Procedure:  SECTION (); Surgeon: Vanessa Riley MD;  Location: [facility] ;  Service: Obstetrics   • REDUCTION MAMMAPLASTY     • WISDOM TOOTH EXTRACTION       Social History   Social History     Substance and Sexual Activity   Alcohol Use No     Social History     Substance and Sexual Activity   Drug Use No     Social History     Tobacco Use   Smoking Status Never   • Passive exposure: Never   Smokeless Tobacco Never     Family History:   Family History   Problem Relation Age of Onset   • Diabetes Mother    • Migraines Mother    • Polycystic ovary syndrome Mother    • Thyroid disease Mother    • No Known Problems Father    • Thyroid disease Sister    • Diabetes Maternal Grandmother    • Thyroid disease Maternal Grandmother    • Thyroid disease Maternal Aunt    • Melanoma Family        Meds/Allergies   PTA meds:   Prior to Admission Medications   Prescriptions Last Dose Informant Patient Reported? Taking?    cyclobenzaprine (FLEXERIL) 5 mg tablet   No No   Sig: Take 1 tablet (5 mg total) by mouth daily at bedtime   meloxicam (Mobic) 7.5 mg tablet   No No   Sig: Take 1 tablet (7.5 mg total) by mouth daily      Facility-Administered Medications: None     No Known Allergies    Objective   First Vitals:   Blood Pressure: 125/63 (23)  Pulse: 84 (23)  Temperature: 98.1 °F (36.7 °C) (23)  Temp Source: Oral (23 5050)  Respirations: 18 (23)  SpO2: 98 % (23)    Current Vitals:   Blood Pressure: 125/63 (23)  Pulse: 84 (23)  Temperature: 98.1 °F (36.7 °C) (23)  Temp Source: Oral (23)  Respirations: 18 (23)  SpO2: 98 % (23)    No intake or output data in the 24 hours ending 23 0757    Invasive Devices     None                 Physical Exam  Vitals and nursing note reviewed. Constitutional:       General: She is not in acute distress. Appearance: Normal appearance. She is well-developed. HENT:      Head: Normocephalic and atraumatic. Right Ear: External ear normal.      Left Ear: External ear normal.      Nose: Nose normal.      Mouth/Throat:      Pharynx: No oropharyngeal exudate. Eyes:      Conjunctiva/sclera: Conjunctivae normal.      Pupils: Pupils are equal, round, and reactive to light. Cardiovascular:      Rate and Rhythm: Normal rate and regular rhythm. Pulmonary:      Effort: Pulmonary effort is normal. No respiratory distress. Abdominal:      General: Abdomen is flat. There is no distension. Palpations: Abdomen is soft. Musculoskeletal:         General: No deformity. Normal range of motion. Cervical back: Normal range of motion and neck supple. Skin:     General: Skin is warm and dry. Capillary Refill: Capillary refill takes less than 2 seconds. Neurological:      General: No focal deficit present. Mental Status: She is alert and oriented to person, place, and time. Mental status is at baseline. Coordination: Coordination normal.   Psychiatric:         Mood and Affect: Mood normal.         Behavior: Behavior normal.         Thought Content: Thought content normal.         Judgment: Judgment normal.           Medical Decision Makin.   Right inner thigh and lower extremity discomfort: D-dimer to rule out venous thromboembolism; D-dimer is negative; plan to treat patient with NSAIDs for musculoskeletal pain. Recent Results (from the past 36 hour(s))   D-dimer, quantitative    Collection Time: 08/22/23  4:44 AM   Result Value Ref Range    D-Dimer, Quant <0.27 <0.50 ug/ml FEU     No orders to display         Portions of the record may have been created with voice recognition software. Occasional wrong word or "sound a like" substitutions may have occurred due to the inherent limitations of voice recognition software. Read the chart carefully and recognize, using context, where substitutions have occurred.           Critical Care Time  Procedures

## 2023-08-23 ENCOUNTER — VBI (OUTPATIENT)
Dept: ADMINISTRATIVE | Facility: OTHER | Age: 38
End: 2023-08-23

## 2023-08-23 NOTE — LETTER
[facility]  [facility] [address]    Date: 08/25/23  Jeaneth Adams  [address] [address]    Dear Kalpesh Hernandez:                                                                                                                              Thank you for choosing [facility] emergency department for care. Your primary care provider wants to make sure that your ongoing medical care is being addressed. If you require follow up care as a result of your emergency department visit, there are a few things the practice would like you to know. As part of the network's continuing commitment to caring for our patients, we have added more same day appointments and have extended office hours to meet your medical needs. After hours, on-call physicians are available via your primary care provider's main office line. We encourage you to contact our office prior to seeking treatment to discuss your symptoms with the medical staff. Together, we can determine the correct course of action. A majority of non-emergent conditions such as: common cold, flu-like symptoms, fevers, strains/sprains, dislocations, minor burns, cuts and animal bites can be treated at [facility] facilities. Diagnostic testing is available at some sites. Of course, if you are experiencing a life threatening medical emergency call 911 or proceed directly to the nearest emergency room. Your nearest [facility] facility is conveniently located at:    [address], [address] [place] [address]  [phone]  [address]  offered at most [facility] your spot online at [email or web address] or on the [address]    Sincerely,    [facility]  No information on file.

## 2023-08-23 NOTE — TELEPHONE ENCOUNTER
Mariam Humphries    ED Visit Information     Ed visit date: 8/22/2023  Diagnosis Description: Right leg pain  In Network? Yes [facility]  Discharge status: Home  Discharged with meds ? No  Number of ED visits to date: 1  ED Severity:4     Outreach Information    Outreach successful: Yes 3  Date letter mailed:8/25/2023  Date Finalized:8/25/2023    Care Coordination    Follow up appointment with pcp: no ED follow up not scheduled  Transportation issues ?  NA    [facility] Outreach    Outreach type: 3 Day Outreach  Emergent necessity warranted by diagnosis: Yes  [facility] PCP: Yes  08/23/2023 03:00 PM EDT by Florina Fisk 08/23/2023 03:00 PM EDT by Jessica Simpson (Self) [phone] (IVITCF)[phone] (Mobile)  [phone] (Mobile) Remove  - Left MessageCommunicated - LMOMx1    08/24/2023 12:28 PM EDT by Florina Fisk 08/24/2023 12:28 PM EDT by Jessica Simpson (Self) [phone] (UAJQMA)[phone] (Mobile)  [phone] (Mobile) Remove  - Left MessageCommunicated - LMOMx2    08/25/2023 12:29 PM EDT by Florina Fisk 08/25/2023 12:29 PM EDT by Jessica Simpson (Self) [phone] (SWCQSL)[phone] (Mobile)  [phone] (Mobile) Remove  - Left MessageCommunicated - LMOMx3 - MyChart letter sent

## 2024-02-19 ENCOUNTER — OFFICE VISIT (OUTPATIENT)
Dept: OBGYN CLINIC | Facility: CLINIC | Age: 39
End: 2024-02-19
Payer: COMMERCIAL

## 2024-02-19 VITALS
HEIGHT: 59 IN | DIASTOLIC BLOOD PRESSURE: 74 MMHG | SYSTOLIC BLOOD PRESSURE: 118 MMHG | BODY MASS INDEX: 33.26 KG/M2 | WEIGHT: 165 LBS

## 2024-02-19 DIAGNOSIS — D68.51 FACTOR V LEIDEN (HCC): ICD-10-CM

## 2024-02-19 DIAGNOSIS — R10.2 PELVIC PAIN: Primary | ICD-10-CM

## 2024-02-19 PROCEDURE — 99214 OFFICE O/P EST MOD 30 MIN: CPT | Performed by: OBSTETRICS & GYNECOLOGY

## 2024-02-19 NOTE — PROGRESS NOTES
This is a 38-year-old female, she is a  4 para 1.  She has a history of PCOS.  History of infertility.  Not using any formal contraception.  Her last period was .  This pain began on Friday, .  She did get some relief with Motrin.  The pain is subsided today.    The external genitalia normal limits the vagina is clean.  Uterus is top normal size.  She is more uncomfortable in the left adnexa are not able to appreciate a large mass.  The right side was benign.  There is no cervical motion tenderness.    Impression probably ruptured ovarian cyst.  This is at midcycle occur.  Patient does have a history of PCOS.  He has been on birth control pills in the past for this problem there is recurrent.  1    Plan will make arrangements for transvaginal ultrasound.  At the good report we may consider birth control pill for ovarian suppression.    Previsit charting 5 minutes face-to-face 15.  Post charting 5 minutes

## 2024-02-19 NOTE — PATIENT INSTRUCTIONS
The patient was informed of probable ruptured ovarian cyst.  Denies any fever chills.  Feeling better after using Motrin.  Will make arrangements for transvaginal ultrasound for examination of the adnexa..  If the ultrasound is negative we may strongly consider using birth control pill for the next 3 to 4 months for ovarian suppression.

## 2024-02-26 ENCOUNTER — HOSPITAL ENCOUNTER (OUTPATIENT)
Dept: RADIOLOGY | Age: 39
Discharge: HOME | End: 2024-02-26
Payer: COMMERCIAL

## 2024-02-26 DIAGNOSIS — R10.2 PELVIC PAIN: ICD-10-CM

## 2024-02-26 PROCEDURE — 76830 TRANSVAGINAL US NON-OB: CPT

## 2024-02-26 PROCEDURE — 76856 US EXAM PELVIC COMPLETE: CPT

## 2024-03-11 ENCOUNTER — RA CDI HCC (OUTPATIENT)
Dept: OTHER | Facility: HOSPITAL | Age: 39
End: 2024-03-11

## 2024-05-10 ENCOUNTER — OFFICE VISIT (OUTPATIENT)
Dept: FAMILY MEDICINE CLINIC | Facility: CLINIC | Age: 39
End: 2024-05-10
Payer: COMMERCIAL

## 2024-05-10 VITALS
HEIGHT: 59 IN | OXYGEN SATURATION: 98 % | RESPIRATION RATE: 16 BRPM | WEIGHT: 170 LBS | DIASTOLIC BLOOD PRESSURE: 78 MMHG | SYSTOLIC BLOOD PRESSURE: 104 MMHG | HEART RATE: 62 BPM | TEMPERATURE: 97.4 F | BODY MASS INDEX: 34.27 KG/M2

## 2024-05-10 DIAGNOSIS — Z00.00 ENCOUNTER FOR ANNUAL PHYSICAL EXAM: ICD-10-CM

## 2024-05-10 DIAGNOSIS — R10.11 ABDOMINAL PAIN, RIGHT UPPER QUADRANT: ICD-10-CM

## 2024-05-10 DIAGNOSIS — H60.543 ECZEMA OF BOTH EXTERNAL EARS: ICD-10-CM

## 2024-05-10 DIAGNOSIS — R10.13 DYSPEPSIA: Primary | ICD-10-CM

## 2024-05-10 DIAGNOSIS — G43.709 CHRONIC MIGRAINE WITHOUT AURA WITHOUT STATUS MIGRAINOSUS, NOT INTRACTABLE: ICD-10-CM

## 2024-05-10 DIAGNOSIS — R61 NIGHT SWEATS: ICD-10-CM

## 2024-05-10 DIAGNOSIS — R19.8 CHANGE IN BOWEL FUNCTION: ICD-10-CM

## 2024-05-10 DIAGNOSIS — L29.9 ITCHY SCALP: ICD-10-CM

## 2024-05-10 PROCEDURE — 99214 OFFICE O/P EST MOD 30 MIN: CPT | Performed by: FAMILY MEDICINE

## 2024-05-10 PROCEDURE — 99395 PREV VISIT EST AGE 18-39: CPT | Performed by: FAMILY MEDICINE

## 2024-05-10 RX ORDER — KETOCONAZOLE 20 MG/ML
1 SHAMPOO TOPICAL 2 TIMES WEEKLY
Qty: 100 ML | Refills: 0 | Status: SHIPPED | OUTPATIENT
Start: 2024-05-10 | End: 2024-06-07

## 2024-05-10 RX ORDER — SUMATRIPTAN 25 MG/1
25 TABLET, FILM COATED ORAL ONCE AS NEEDED
Qty: 15 TABLET | Refills: 0 | Status: SHIPPED | OUTPATIENT
Start: 2024-05-10

## 2024-05-10 RX ORDER — KETOCONAZOLE 20 MG/ML
1 SHAMPOO TOPICAL 2 TIMES WEEKLY
Qty: 100 ML | Refills: 0 | Status: SHIPPED | OUTPATIENT
Start: 2024-05-13 | End: 2024-05-10

## 2024-05-10 RX ORDER — PANTOPRAZOLE SODIUM 40 MG/1
40 TABLET, DELAYED RELEASE ORAL DAILY
Qty: 30 TABLET | Refills: 1 | Status: SHIPPED | OUTPATIENT
Start: 2024-05-10

## 2024-05-10 NOTE — PROGRESS NOTES
ADULT ANNUAL PHYSICAL  [facility]    NAME: Robert Serrano  AGE: 38 y.o. SEX: female  : 1985     DATE: 5/10/2024     Assessment and Plan:       38-year-old female with a history of factor V Leiden mutation, PCOS, adenomyosis, and prediabetes seen today for annual physical but also had several concerns.  GI symptoms may be due to gastritis, irritable bowel disease, celiac, inflammatory bowel disease, or gallbladder dysfunction.  Ordered labs and stool test.  Night sweats may also be related to uncontrolled acid reflux but will further evaluate with TB testing, CBC, and hormone levels.  No adenopathy or abnormal lung findings on exam.  Imitrex ordered for what I believe are migraine headaches.  This may also be compounded by tension headache.  Lastly, recommend patient apply an emollient to her outer ear to help reduce dryness. Steroid drops prescribed for the right ear to help reduce inflammation and dryness in right ear canal.      Problem List Items Addressed This Visit    None  Visit Diagnoses       Dyspepsia    -  Primary    Relevant Medications    pantoprazole (PROTONIX) 40 mg tablet    Other Relevant Orders    H. pylori antigen, stool    Celiac Disease Panel    Pancreatic elastase, fecal    Calprotectin,Fecal    Night sweats        Relevant Orders    Comprehensive metabolic panel    CBC and differential    Celiac Disease Panel    Quantiferon TB Gold Plus Assay    C-reactive protein    TSH, 3rd generation with Free T4 reflex    Follicle stimulating hormone    Estradiol    Chronic migraine without aura without status migrainosus, not intractable        Relevant Medications    SUMAtriptan (IMITREX) 25 mg tablet    Other Relevant Orders    Iron Panel (Includes Ferritin, Iron Sat%, Iron, and TIBC)    Change in bowel function        Relevant Orders    US right upper quadrant    H. pylori antigen, stool    Comprehensive metabolic panel    CBC and  differential    Celiac Disease Panel    TSH, 3rd generation with Free T4 reflex    Pancreatic elastase, fecal    Calprotectin,Fecal    Abdominal pain, right upper quadrant        Relevant Orders    US right upper quadrant    H. pylori antigen, stool    Comprehensive metabolic panel    CBC and differential    Celiac Disease Panel    Iron Panel (Includes Ferritin, Iron Sat%, Iron, and TIBC)    Pancreatic elastase, fecal    Calprotectin,Fecal    Encounter for annual physical exam        Relevant Orders    Lipid panel    Eczema of both external ears        Relevant Medications    neomycin-polymyxin-hydrocortisone (CORTISPORIN) otic solution    ketoconazole (NIZORAL) 2 % shampoo (Start on 5/13/2024)    Itchy scalp        Relevant Medications    ketoconazole (NIZORAL) 2 % shampoo (Start on 5/13/2024)            Immunizations and preventive care screenings were discussed with patient today. Appropriate education was printed on patient's after visit summary.    Counseling:  Dental Health: discussed importance of regular tooth brushing, flossing, and dental visits.  Exercise: the importance of regular exercise/physical activity was discussed. Recommend exercise 3-5 times per week for at least 30 minutes.          No follow-ups on file.     Chief Complaint:     Chief Complaint   Patient presents with   • Physical Exam   • Care Gap Request     PHQ      History of Present Illness:     Adult Annual Physical   Patient here for a comprehensive physical exam. The patient reports problems - itchy ears, GERD, vision changes, headaches, and night sweats .  Eczema of the ears: Started 6-7 months. Dry, itches, and flakes. Has not applied anything to the area. Also notes itching of the scalp. Plan to see derm but hasn't made an appt   GERD: Worse if she eats and drink at the same time. Has to wait 3- 4 hours to lay down after a meal. Burning in the chest. Has had intermittent diarrhea and abdominal pain. Started 4-5 months ago. Stools  are loose and copious. These episodes last for 24 hours and then will have formed stools. Was diagnosed adenomyosis. Saw ob/gyn and suggested OCP but is apprehensive   Night sweats: started 1 month. No weight loss. No exposure to TB. No recent travels. Not taking any supplements. Recently developed GERD symptoms.  Headaches: Acute on chronic. History of TMJ dysfunction., Used to use the  which helped at the time. Motrin help. Bitemproal. Sensitivity to light when she has headaches. She will sometimes feel nausous. Headaches will last 1-2 days. Started happening more frequently.     Diet and Physical Activity  Diet/Nutrition:  cut back on the spicy food. Also reduced the raw vegetable due to fear of developing diarrhea . Doesn't consume a lot of dairy  Exercise:  walks 30 min a day  and will occasional go to the gym  .      Depression Screening  PHQ-2/9 Depression Screening    Little interest or pleasure in doing things: 0 - not at all  Feeling down, depressed, or hopeless: 0 - not at all  PHQ-2 Score: 0  PHQ-2 Interpretation: Negative depression screen       General Health  Sleep: gets 7-8 hours of sleep on average.   Hearing: normal - bilateral.  Vision: near sighted but doesn't wear glasses. History of astigmatism  Dental:  6 months ago .       /GYN Health  Follows with gynecology? Yes and has an upcoming appt with gyn in Dec  Last menstrual period:   Contraceptive method:  none .  History of STDs?: no.         Review of Systems:     Review of Systems   Past Medical History:     Past Medical History:   Diagnosis Date   • Disease of thyroid gland    • Factor 5 Leiden mutation, heterozygous (HCC)    • History of PCOS    • MTHFR mutation    • Pre-diabetes       Past Surgical History:     Past Surgical History:   Procedure Laterality Date   • [place]  DELIVERY ONLY N/A 10/9/2020    Procedure:  SECTION ();  Surgeon: Darrius Pham MD;  Location: [facility] ;  Service: Obstetrics   •  REDUCTION MAMMAPLASTY     • WISDOM TOOTH EXTRACTION        Social History:     Social History     Socioeconomic History   • Marital status: /Civil Union     Spouse name: Peyman Laureano   • Number of children: 1   • Years of education: None   • Highest education level: Some college, no degree   Occupational History     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   Tobacco Use   • Smoking status: Never     Passive exposure: Never   • Smokeless tobacco: Never   Vaping Use   • Vaping status: Never Used   Substance and Sexual Activity   • Alcohol use: No   • Drug use: No   • Sexual activity: Yes     Partners: Male     Birth control/protection: Rhythm   Other Topics Concern   • None   Social History Narrative    Uses safety equipment seatbelts     Social Determinants of Health     Financial Resource Strain: Not on file   Food Insecurity: Not on file   Transportation Needs: Not on file   Physical Activity: Not on file   Stress: Not on file   Social Connections: Not on file   Intimate Partner Violence: Not on file   Housing Stability: Not on file      Family History:     Family History   Problem Relation Age of Onset   • Diabetes Mother    • Migraines Mother    • Polycystic ovary syndrome Mother    • Thyroid disease Mother    • No Known Problems Father    • Thyroid disease Sister    • Diabetes Maternal Grandmother    • Thyroid disease Maternal Grandmother    • Thyroid disease Maternal Aunt    • Melanoma Family       Current Medications:     Current Outpatient Medications   Medication Sig Dispense Refill   • [START ON 5/13/2024] ketoconazole (NIZORAL) 2 % shampoo Apply 1 Application topically 2 (two) times a week for 8 doses 100 mL 0   • neomycin-polymyxin-hydrocortisone (CORTISPORIN) otic solution Administer 4 drops to the right ear every 8 (eight) hours 10 mL 0   • pantoprazole (PROTONIX) 40 mg tablet Take 1 tablet (40 mg total) by mouth daily 30 tablet 1   • SUMAtriptan (IMITREX) 25 mg tablet Take 1 tablet (25 mg total)  "by mouth once as needed for migraine for up to 1 dose may repeat in 2 hours if necessary 15 tablet 0     No current facility-administered medications for this visit.      Allergies:     No Known Allergies   Physical Exam:     /78 (BP Location: Right arm, Patient Position: Sitting, Cuff Size: Large)   Pulse 62   Temp (!) 97.4 °F (36.3 °C) (Tympanic)   Resp 16   Ht 4' 11\" (1.499 m)   Wt 77.1 kg (170 lb)   SpO2 98%   BMI 34.34 kg/m²     Physical Exam  Vitals reviewed.   Constitutional:       General: She is not in acute distress.     Appearance: Normal appearance. She is not ill-appearing or toxic-appearing.   HENT:      Head: Normocephalic and atraumatic.      Right Ear: Tympanic membrane normal. Swelling and tenderness present.      Left Ear: Tympanic membrane normal.      Ears:      Comments: Dry patch on the right external ear and ear canal  Dryness and erythema in the left external ear      Nose: No congestion.      Mouth/Throat:      Mouth: Mucous membranes are moist.      Pharynx: No oropharyngeal exudate.   Eyes:      General:         Right eye: No discharge.         Left eye: No discharge.      Extraocular Movements: Extraocular movements intact.   Cardiovascular:      Rate and Rhythm: Normal rate and regular rhythm.      Heart sounds: No murmur heard.  Pulmonary:      Effort: Pulmonary effort is normal. No respiratory distress.      Breath sounds: Normal breath sounds. No stridor. No wheezing, rhonchi or rales.   Abdominal:      General: There is no distension.      Palpations: Abdomen is soft. There is no mass.      Tenderness: There is abdominal tenderness (RUQ, epigastric pain). There is no guarding.      Hernia: No hernia is present.   Lymphadenopathy:      Cervical: No cervical adenopathy.   Skin:     General: Skin is warm.      Comments: Dry skin and papules on the scalp    Neurological:      Mental Status: She is alert and oriented to person, place, and time.   Psychiatric:         Mood " and Affect: Mood normal.         Behavior: Behavior normal.        MD [facility]

## 2024-05-11 ENCOUNTER — APPOINTMENT (OUTPATIENT)
Dept: LAB | Age: 39
End: 2024-05-11
Payer: COMMERCIAL

## 2024-05-11 DIAGNOSIS — G43.709 CHRONIC MIGRAINE WITHOUT AURA WITHOUT STATUS MIGRAINOSUS, NOT INTRACTABLE: ICD-10-CM

## 2024-05-11 DIAGNOSIS — R10.13 DYSPEPSIA: ICD-10-CM

## 2024-05-11 DIAGNOSIS — R19.8 CHANGE IN BOWEL FUNCTION: ICD-10-CM

## 2024-05-11 DIAGNOSIS — R61 NIGHT SWEATS: ICD-10-CM

## 2024-05-11 DIAGNOSIS — Z00.00 ENCOUNTER FOR ANNUAL PHYSICAL EXAM: ICD-10-CM

## 2024-05-11 DIAGNOSIS — R10.11 ABDOMINAL PAIN, RIGHT UPPER QUADRANT: ICD-10-CM

## 2024-05-11 LAB
ALBUMIN SERPL BCP-MCNC: 4.1 G/DL (ref 3.5–5)
ALP SERPL-CCNC: 54 U/L (ref 34–104)
ALT SERPL W P-5'-P-CCNC: 31 U/L (ref 7–52)
ANION GAP SERPL CALCULATED.3IONS-SCNC: 9 MMOL/L (ref 4–13)
AST SERPL W P-5'-P-CCNC: 20 U/L (ref 13–39)
BASOPHILS # BLD AUTO: 0.05 THOUSANDS/ÂΜL (ref 0–0.1)
BASOPHILS NFR BLD AUTO: 1 % (ref 0–1)
BILIRUB SERPL-MCNC: 0.36 MG/DL (ref 0.2–1)
BUN SERPL-MCNC: 7 MG/DL (ref 5–25)
CALCIUM SERPL-MCNC: 9.1 MG/DL (ref 8.4–10.2)
CHLORIDE SERPL-SCNC: 102 MMOL/L (ref 96–108)
CHOLEST SERPL-MCNC: 198 MG/DL
CO2 SERPL-SCNC: 26 MMOL/L (ref 21–32)
CREAT SERPL-MCNC: 0.64 MG/DL (ref 0.6–1.3)
CRP SERPL QL: 7.2 MG/L
EOSINOPHIL # BLD AUTO: 0.1 THOUSAND/ÂΜL (ref 0–0.61)
EOSINOPHIL NFR BLD AUTO: 1 % (ref 0–6)
ERYTHROCYTE [DISTWIDTH] IN BLOOD BY AUTOMATED COUNT: 13.3 % (ref 11.6–15.1)
ESTRADIOL SERPL-MCNC: 183.5 PG/ML
FERRITIN SERPL-MCNC: 43 NG/ML (ref 11–307)
FSH SERPL-ACNC: 5.5 MIU/ML
GFR SERPL CREATININE-BSD FRML MDRD: 113 ML/MIN/1.73SQ M
GLIADIN PEPTIDE IGA SER-ACNC: <0.2 U/ML
GLIADIN PEPTIDE IGA SER-ACNC: NEGATIVE
GLIADIN PEPTIDE IGG SER-ACNC: <0.4 U/ML
GLIADIN PEPTIDE IGG SER-ACNC: NEGATIVE
GLUCOSE P FAST SERPL-MCNC: 100 MG/DL (ref 65–99)
HCT VFR BLD AUTO: 42.9 % (ref 34.8–46.1)
HDLC SERPL-MCNC: 38 MG/DL
HGB BLD-MCNC: 13.5 G/DL (ref 11.5–15.4)
IGA SERPL-MCNC: 177 MG/DL (ref 66–433)
IMM GRANULOCYTES # BLD AUTO: 0.03 THOUSAND/UL (ref 0–0.2)
IMM GRANULOCYTES NFR BLD AUTO: 0 % (ref 0–2)
IRON SATN MFR SERPL: 23 % (ref 15–50)
IRON SERPL-MCNC: 67 UG/DL (ref 50–212)
LDLC SERPL CALC-MCNC: 119 MG/DL (ref 0–100)
LYMPHOCYTES # BLD AUTO: 3.23 THOUSANDS/ÂΜL (ref 0.6–4.47)
LYMPHOCYTES NFR BLD AUTO: 37 % (ref 14–44)
MCH RBC QN AUTO: 28.4 PG (ref 26.8–34.3)
MCHC RBC AUTO-ENTMCNC: 31.5 G/DL (ref 31.4–37.4)
MCV RBC AUTO: 90 FL (ref 82–98)
MONOCYTES # BLD AUTO: 0.44 THOUSAND/ÂΜL (ref 0.17–1.22)
MONOCYTES NFR BLD AUTO: 5 % (ref 4–12)
NEUTROPHILS # BLD AUTO: 4.78 THOUSANDS/ÂΜL (ref 1.85–7.62)
NEUTS SEG NFR BLD AUTO: 56 % (ref 43–75)
NONHDLC SERPL-MCNC: 160 MG/DL
NRBC BLD AUTO-RTO: 0 /100 WBCS
PLATELET # BLD AUTO: 329 THOUSANDS/UL (ref 149–390)
PMV BLD AUTO: 10.5 FL (ref 8.9–12.7)
POTASSIUM SERPL-SCNC: 4.1 MMOL/L (ref 3.5–5.3)
PROT SERPL-MCNC: 7.1 G/DL (ref 6.4–8.4)
RBC # BLD AUTO: 4.75 MILLION/UL (ref 3.81–5.12)
SODIUM SERPL-SCNC: 137 MMOL/L (ref 135–147)
TIBC SERPL-MCNC: 293 UG/DL (ref 250–450)
TRIGL SERPL-MCNC: 203 MG/DL
TSH SERPL DL<=0.05 MIU/L-ACNC: 1.5 UIU/ML (ref 0.45–4.5)
TTG IGA SER-ACNC: <0.5 U/ML
TTG IGA SER-ACNC: NEGATIVE
TTG IGG SER-ACNC: <0.8 U/ML
TTG IGG SER-ACNC: NEGATIVE
UIBC SERPL-MCNC: 226 UG/DL (ref 155–355)
WBC # BLD AUTO: 8.63 THOUSAND/UL (ref 4.31–10.16)

## 2024-05-11 PROCEDURE — 82653 EL-1 FECAL QUANTITATIVE: CPT

## 2024-05-11 PROCEDURE — 82670 ASSAY OF TOTAL ESTRADIOL: CPT

## 2024-05-11 PROCEDURE — 87338 HPYLORI STOOL AG IA: CPT

## 2024-05-11 PROCEDURE — 86258 DGP ANTIBODY EACH IG CLASS: CPT

## 2024-05-11 PROCEDURE — 85025 COMPLETE CBC W/AUTO DIFF WBC: CPT

## 2024-05-11 PROCEDURE — 80061 LIPID PANEL: CPT

## 2024-05-11 PROCEDURE — 86364 TISS TRNSGLTMNASE EA IG CLAS: CPT

## 2024-05-11 PROCEDURE — 36415 COLL VENOUS BLD VENIPUNCTURE: CPT

## 2024-05-11 PROCEDURE — 86140 C-REACTIVE PROTEIN: CPT

## 2024-05-11 PROCEDURE — 83001 ASSAY OF GONADOTROPIN (FSH): CPT

## 2024-05-11 PROCEDURE — 83993 ASSAY FOR CALPROTECTIN FECAL: CPT

## 2024-05-11 PROCEDURE — 80053 COMPREHEN METABOLIC PANEL: CPT

## 2024-05-11 PROCEDURE — 82728 ASSAY OF FERRITIN: CPT

## 2024-05-11 PROCEDURE — 83550 IRON BINDING TEST: CPT

## 2024-05-11 PROCEDURE — 83540 ASSAY OF IRON: CPT

## 2024-05-11 PROCEDURE — 82784 ASSAY IGA/IGD/IGG/IGM EACH: CPT

## 2024-05-11 PROCEDURE — 86480 TB TEST CELL IMMUN MEASURE: CPT

## 2024-05-11 PROCEDURE — 84443 ASSAY THYROID STIM HORMONE: CPT

## 2024-05-12 LAB
GAMMA INTERFERON BACKGROUND BLD IA-ACNC: 0.01 IU/ML
H PYLORI AG STL QL IA: NEGATIVE
M TB IFN-G BLD-IMP: NEGATIVE
M TB IFN-G CD4+ BCKGRND COR BLD-ACNC: 0.01 IU/ML
M TB IFN-G CD4+ BCKGRND COR BLD-ACNC: 0.01 IU/ML
MITOGEN IGNF BCKGRD COR BLD-ACNC: 9.99 IU/ML

## 2024-05-14 ENCOUNTER — HOSPITAL ENCOUNTER (OUTPATIENT)
Dept: RADIOLOGY | Age: 39
Discharge: HOME | End: 2024-05-14
Payer: COMMERCIAL

## 2024-05-14 DIAGNOSIS — R19.8 CHANGE IN BOWEL FUNCTION: ICD-10-CM

## 2024-05-14 DIAGNOSIS — R10.11 ABDOMINAL PAIN, RIGHT UPPER QUADRANT: ICD-10-CM

## 2024-05-14 PROCEDURE — 76705 ECHO EXAM OF ABDOMEN: CPT

## 2024-05-15 LAB — ELASTASE PANC STL-MCNT: >500 UG ELAST./G

## 2024-05-23 LAB — CALPROTECTIN STL-MCNT: <5 UG/G (ref 0–120)

## 2024-06-19 ENCOUNTER — OFFICE VISIT (OUTPATIENT)
Dept: FAMILY MEDICINE CLINIC | Facility: CLINIC | Age: 39
End: 2024-06-19
Payer: COMMERCIAL

## 2024-06-19 VITALS
WEIGHT: 172.6 LBS | BODY MASS INDEX: 34.8 KG/M2 | OXYGEN SATURATION: 97 % | HEIGHT: 59 IN | RESPIRATION RATE: 15 BRPM | TEMPERATURE: 97.7 F | HEART RATE: 79 BPM | SYSTOLIC BLOOD PRESSURE: 110 MMHG | DIASTOLIC BLOOD PRESSURE: 74 MMHG

## 2024-06-19 DIAGNOSIS — M26.609 TMJ (TEMPOROMANDIBULAR JOINT DISORDER): ICD-10-CM

## 2024-06-19 DIAGNOSIS — K76.0 FATTY LIVER: Primary | ICD-10-CM

## 2024-06-19 DIAGNOSIS — R10.13 DYSPEPSIA: ICD-10-CM

## 2024-06-19 DIAGNOSIS — G43.709 CHRONIC MIGRAINE WITHOUT AURA WITHOUT STATUS MIGRAINOSUS, NOT INTRACTABLE: ICD-10-CM

## 2024-06-19 DIAGNOSIS — H60.543 ECZEMA OF BOTH EXTERNAL EARS: ICD-10-CM

## 2024-06-19 DIAGNOSIS — R10.11 ABDOMINAL PAIN, RIGHT UPPER QUADRANT: ICD-10-CM

## 2024-06-19 PROCEDURE — 99214 OFFICE O/P EST MOD 30 MIN: CPT | Performed by: FAMILY MEDICINE

## 2024-06-19 RX ORDER — PANTOPRAZOLE SODIUM 40 MG/1
40 TABLET, DELAYED RELEASE ORAL DAILY
Qty: 30 TABLET | Refills: 1 | Status: SHIPPED | OUTPATIENT
Start: 2024-06-19 | End: 2024-06-19

## 2024-06-19 RX ORDER — CYCLOBENZAPRINE HCL 5 MG
5 TABLET ORAL
Qty: 30 TABLET | Refills: 0 | Status: SHIPPED | OUTPATIENT
Start: 2024-06-19

## 2024-06-19 RX ORDER — PANTOPRAZOLE SODIUM 40 MG/1
40 TABLET, DELAYED RELEASE ORAL DAILY
Start: 2024-06-19

## 2024-06-19 NOTE — PROGRESS NOTES
Ambulatory Visit  Name: Robert Serrano      : 1985      MRN: [id number]  Encounter Provider: Verito Price MD  Encounter Date: 2024   Encounter department: [facility]    Assessment & Plan   1. Fatty liver  Comments:  Mentioned on recent RUQ U/S. Managment reviewed including weight loss  2. Chronic migraine without aura without status migrainosus, not intractable  Comments:  Imitrex ordered at the last visit. Reports no relief with medication. Pain may be originating from the TMJ.  Does have a mouth splint but is uncomfortable. Suggest contacting her dentist to get a new splint. Will also prescribe low dose muscle relaxer to take at as needed   3. Eczema of both external ears  Comments:  Emolient reccomended and neomycin-polymcin ear drops prescribed at last visit  4. Abdominal pain, right upper quadrant  5. Dyspepsia  Comments:  Stool testing came bak negative. PPI provided and porvided moderate relief. Continue PPI as needed. Encourage weight loss. Anti-reflux reccomendations provided  Orders:  -     pantoprazole (PROTONIX) 40 mg tablet; Take 1 tablet (40 mg total) by mouth daily  6. TMJ (temporomandibular joint disorder)  -     cyclobenzaprine (FLEXERIL) 5 mg tablet; Take 1 tablet (5 mg total) by mouth daily at bedtime       History of Present Illness     Here for follow up  Feeling better in terms of GI symptoms   PPI helped. Still having issues with fluids where she feels she is going to regurgitate. This also happens if she eats too quickly or lays down after eating. This also happened when she was pregnant. RUQ U/S showed mild hepatomegaly with steatosis. Imitrex did not help headaches. Believes it her TMJ. Feels like the temporal muscle is tanmay. Was rxed a mouth guard in [place] but is uncomfortable        Review of Systems    Objective     /74 (BP Location: Left arm, Patient Position: Sitting, Cuff Size: Standard)   Pulse 79   Temp 97.7 °F (36.5 °C) (Tympanic)   " Resp 15   Ht 4' 11\" (1.499 m)   Wt 78.3 kg (172 lb 9.6 oz)   SpO2 97%   BMI 34.86 kg/m²     Physical Exam  Constitutional:       Appearance: Normal appearance.   HENT:      Head: Normocephalic and atraumatic.      Jaw: Tenderness present.     Eyes:      Extraocular Movements: Extraocular movements intact.   Cardiovascular:      Rate and Rhythm: Normal rate and regular rhythm.      Heart sounds: No murmur heard.  Pulmonary:      Effort: Pulmonary effort is normal.      Breath sounds: Normal breath sounds.   Abdominal:      Palpations: Abdomen is soft.      Tenderness: There is abdominal tenderness in the right upper quadrant and epigastric area.   Skin:     General: Skin is warm.   Neurological:      Mental Status: She is alert and oriented to person, place, and time.   Psychiatric:         Mood and Affect: Mood normal.         Behavior: Behavior normal.       Administrative Statements           "

## 2024-07-10 DIAGNOSIS — M26.609 TMJ (TEMPOROMANDIBULAR JOINT DISORDER): ICD-10-CM

## 2024-07-10 RX ORDER — CYCLOBENZAPRINE HCL 5 MG
5 TABLET ORAL
Qty: 30 TABLET | Refills: 0 | Status: SHIPPED | OUTPATIENT
Start: 2024-07-10

## 2024-09-26 ENCOUNTER — OFFICE VISIT (OUTPATIENT)
Dept: DERMATOLOGY | Facility: CLINIC | Age: 39
End: 2024-09-26
Payer: COMMERCIAL

## 2024-09-26 VITALS — TEMPERATURE: 97.6 F

## 2024-09-26 DIAGNOSIS — D22.9 MULTIPLE BENIGN MELANOCYTIC NEVI: Primary | ICD-10-CM

## 2024-09-26 DIAGNOSIS — L21.9 SEBORRHEIC DERMATITIS: ICD-10-CM

## 2024-09-26 PROCEDURE — 99214 OFFICE O/P EST MOD 30 MIN: CPT

## 2024-09-26 RX ORDER — CLOBETASOL PROPIONATE 0.5 MG/G
CREAM TOPICAL
Qty: 15 G | Refills: 1 | Status: SHIPPED | OUTPATIENT
Start: 2024-09-26

## 2024-09-26 RX ORDER — KETOCONAZOLE 20 MG/ML
SHAMPOO TOPICAL
Qty: 100 ML | Refills: 10 | Status: SHIPPED | OUTPATIENT
Start: 2024-09-26

## 2024-09-26 NOTE — PROGRESS NOTES
"[facility] Note     Patient Name: Robert Serrano  Encounter Date: 9/26/24     Have you been cared for by a [facility] Dermatologist in the last 3 years and, if so, which description applies to you?    Yes.  I have been here within the last 3 years, and my medical history has NOT changed since that time.  I am FEMALE/of child-bearing potential.    REVIEW OF SYSTEMS:  Have you recently had or currently have any of the following? No changes in my recent health.   PAST MEDICAL HISTORY:  Have you personally ever had or currently have any of the following?  If \"YES,\" then please provide more detail. No changes in my medical history.   HISTORY OF IMMUNOSUPPRESSION: Do you have a history of any of the following:  Systemic Immunosuppression such as Diabetes, Biologic or Immunotherapy, Chemotherapy, Organ Transplantation, Bone Marrow Transplantation or Prednisone?  No     Answering \"YES\" requires the addition of the dotphrase \"IMMUNOSUPPRESSED\" as the first diagnosis of the patient's visit.   FAMILY HISTORY:  Any \"first degree relatives\" (parent, brother, sister, or child) with the following?    No changes in my family's known health.   PATIENT EXPERIENCE:    Do you want the Dermatologist to perform a COMPLETE skin exam today including a clinical examination under the \"bra and underwear\" areas?  Yes  If necessary, do we have your permission to call and leave a detailed message on your Preferred Phone number that includes your specific medical information?  Yes      No Known Allergies   Current Outpatient Medications:     cyclobenzaprine (FLEXERIL) 5 mg tablet, TAKE ONE TABLET BY MOUTH AT BEDTIME, Disp: 30 tablet, Rfl: 0    neomycin-polymyxin-hydrocortisone (CORTISPORIN) otic solution, Administer 4 drops to the right ear every 8 (eight) hours, Disp: 10 mL, Rfl: 0    pantoprazole (PROTONIX) 40 mg tablet, Take 1 tablet (40 mg total) by mouth daily, Disp: , Rfl:           Whom besides the patient is providing " "clinical information about today's encounter?   NO ADDITIONAL HISTORIAN (patient alone provided history)    Physical Exam and Assessment/Plan by Diagnosis:      MELANOCYTIC NEVI (\"Moles\")    Physical Exam:  Anatomic Location Affected:   trunk and extremities  Morphological Description:  Scattered, 1-4mm round to ovoid, symmetrical-appearing, even bordered, skin colored to dark brown macules/papules      Additional History of Present Condition:  Present on exam. Denies any pain, itch, bleeding. Present for years. Denies actively changing or growing moles.     Assessment and Plan:  Based on a thorough discussion of this condition and the management approach to it (including a comprehensive discussion of the known risks, side effects and potential benefits of treatment), the patient (family) agrees to implement the following specific plan:  Reassured benign.   Monitor for changes. ABCDE's of melanoma handout provided.   Practice sun protection. Apply broad spectrum (UVA and UVB) sunscreen, SPF 30 or higher every 2 hours. Wear sun protective clothing, hats, and sunglasses.     SEBORRHEIC DERMATITIS    Physical Exam:  Anatomic Location:  scalp and outer ears   Morphologic Description:  flaking of bilateral conchal bowl right>left with underlying erythema, normal appearing scalp     Additional History of Present Condition:  Patient reports her primary care have her ketoconazole 2% shampoo to use of her scalp for itching and flaking. She said she used it once but then stopped. She is only using Eucerin cream for her ears. She reports her ears are very itchy.     Plan:  Ketoconazole 2% shampoo-Lather into scalp and allow to sit for 5-10 min then rinse. Please apply three times a week (Monday, Wednesday, Friday). Can use to wash outer ears when in shower.   Clobetasol 0.05% cream- apply to affected areas of outer ears twice a day for up to 2 weeks only, then as needed for flares. DO NOT PUSH DOWN EAR CANAL.         "   Follow-up: 1 year for skin exam.   Tabatha Hernandez PA-C  Scribe Attestation      I,:  Nino Snell MA am acting as a scribe while in the presence of the attending physician.:       I,:  Tabatha Hernandez PA-C personally performed the services described in this documentation    as scribed in my presence.:

## 2024-09-26 NOTE — PATIENT INSTRUCTIONS
"SEBORRHEIC DERMATITIS       Assessment and Plan:  Based on a thorough discussion of this condition and the management approach to it (including a comprehensive discussion of the known risks, side effects and potential benefits of treatment), the patient (family) agrees to implement the following specific plan:           SCALP  Start OTC anti-dandruff shampoo (Head & Shoulders, Selsun Blue, Neutrogena T-gel or T-Sal) 2x/week to damp scalp, let sit 5-10 minutes then rinse (may use normal shampoo/conditioner thereafter as desired) and can alternate with ketoconazole shampoo 2-3 times per week, lather for 5 minutes prior to rinsing off. For facial involvement, ketoconazole cream can be used daily for maintenance therapy, and should be used a few times a week for suppression when skin is under good control.          EARS  For flares only, apply the steroid cream (clobetasol) twice a day for up to 5-7 days at a time as needed.  Discussed AE of skin thinning with chronic use. Use this only when you have flares of your rash.        The chronic nature of this condition and association with normal skin yeast were reviewed. The goal of therapy is to control symptoms, but this is not typically something we cure and intermittent flares can be expected.    If severity merited by exam findings, topical steroids can be used for no longer than 1 week on face and 2 weeks on scalp (due to risks of atrophy, acne, etc) for significant flares for acute control, but azole therapy as above is the long term maintenance therapy of choice.        MELANOCYTIC NEVI (\"Moles\")        Assessment and Plan:  Based on a thorough discussion of this condition and the management approach to it (including a comprehensive discussion of the known risks, side effects and potential benefits of treatment), the patient (family) agrees to implement the following specific plan:  Reassured benign.   Monitor for changes. ABCDE's of melanoma handout provided. " "  Practice sun protection. Apply broad spectrum (UVA and UVB) sunscreen, SPF 30 or higher every 2 hours. Wear sun protective clothing, hats, and sunglasses.      Melanocytic Nevi  Melanocytic nevi (\"moles\") are tan or brown, raised or flat areas of the skin which have an increased number of melanocytes. Melanocytes are the cells in our body which make pigment and account for skin color.    Some moles are present at birth (I.e., \"congenital nevi\"), while others come up later in life (i.e., \"acquired nevi\").  The sun can stimulate the body to make more moles.  Sunburns are not the only thing that triggers more moles.  Chronic sun exposure can do it too.     Clinically distinguishing a healthy mole from melanoma may be difficult, even for experienced dermatologists. The \"ABCDE's\" of moles have been suggested as a means of helping to alert a person to a suspicious mole and the possible increased risk of melanoma.  The suggestions for raising alert are as follows:    Asymmetry: Healthy moles tend to be symmetric, while melanomas are often asymmetric.  Asymmetry means if you draw a line through the mole, the two halves do not match in color, size, shape, or surface texture. Asymmetry can be a result of rapid enlargement of a mole, the development of a raised area on a previously flat lesion, scaling, ulceration, bleeding or scabbing within the mole.  Any mole that starts to demonstrate \"asymmetry\" should be examined promptly by a board certified dermatologist.     Border: Healthy moles tend to have discrete, even borders.  The border of a melanoma often blends into the normal skin and does not sharply delineate the mole from normal skin.  Any mole that starts to demonstrate \"uneven borders\" should be examined promptly by a board certified dermatologist.     Color: Healthy moles tend to be one color throughout.  Melanomas tend to be made up of different colors ranging from dark black, blue, white, or red.  Any mole that " "demonstrates a color change should be examined promptly by a board certified dermatologist.     Diameter: Healthy moles tend to be smaller than 0.6 cm in size; an exception are \"congenital nevi\" that can be larger.  Melanomas tend to grow and can often be greater than 0.6 cm (1/4 of an inch, or the size of a pencil eraser). This is only a guideline, and many normal moles may be larger than 0.6 cm without being unhealthy.  Any mole that starts to change in size (small to bigger or bigger to smaller) should be examined promptly by a board certified dermatologist.     Evolving: Healthy moles tend to \"stay the same.\"  Melanomas may often show signs of change or evolution such as a change in size, shape, color, or elevation.  Any mole that starts to itch, bleed, crust, burn, hurt, or ulcerate or demonstrate a change or evolution should be examined promptly by a board certified dermatologist.      Dysplastic Nevi  Dysplastic moles are moles that fit the ABCDE rules of melanoma but are not identified as melanomas when examined under the microscope.  They may indicate an increased risk of melanoma in that person. If there is a family history of melanoma, most experts agree that the person may be at an increased risk for developing a melanoma.  Experts still do not agree on what dysplastic moles mean in patients without a personal or family history of melanoma.  Dysplastic moles are usually larger than common moles and have different colors within it with irregular borders. The appearance can be very similar to a melanoma. Biopsies of dysplastic moles may show abnormalities which are different from a regular mole.      Melanoma  Malignant melanoma is a type of skin cancer that can be deadly if it spreads throughout the body. The incidence of melanoma in the [place] is growing faster than any other cancer. Melanoma usually grows near the surface of the skin for a period of time, and then begins to grow deeper into the " "skin. Once it grows deeper into the skin, the risk of spread to other organs greatly increases. Therefore, early detection and removal of a malignant melanoma may result in a better chance at a complete cure; removal after the tumor has spread may not be as effective, leading to worse clinical outcomes such as death.    The true rate of nevus transformation into a melanoma is unknown. It has been estimated that the lifetime risk for any acquired melanocytic nevus on any 20-year-old individual transforming into melanoma by age 80 is 0.03% (1 in 3,164) for men and 0.009% (1 in 10,800) for women.     The appearance of a \"new mole\" remains one of the most reliable methods for identifying a malignant melanoma.  Occasionally, melanomas appear as rapidly growing, blue-black, dome-shaped bumps within a previous mole or previous area of normal skin.  Other times, melanomas are suspected when a mole suddenly appears or changes. Itching, burning, or pain in a pigmented lesion should increase suspicion, but most patients with early melanoma have no skin discomfort whatsoever.  Melanoma can occur anywhere on the skin, including areas that are difficult for self-examination. Many melanomas are first noticed by other family members.  Suspicious-looking moles may be removed for microscopic examination.       You may be able to prevent death from melanoma by doing two simple things:    Try to avoid unnecessary sun exposure and protect your skin when it is exposed to the sun.  People who live near the equator, people who have intermittent exposures to large amounts of sun, and people who have had sunburns in childhood or adolescence have an increased risk for melanoma. Sun sense and vigilant sun protection may be keys to helping to prevent melanoma.  We recommend wearing UPF-rated sun protective clothing and sunglasses whenever possible and applying a moisturizer-sunscreen combination product (SPF 50+) such as Neutrogena Daily " "Defense to sun exposed areas of skin at least three times a day.    Have your moles regularly examined by a board certified dermatologist AND by yourself or a family member/friend at home.  We recommend that you have your moles examined at least once a year by a board certified dermatologist.  Use your birthday as an annual reminder to have your \"Birthday Suit\" (I.e., your skin) examined; it is a nice birthday gift to yourself to know that your skin is healthy appearing!  Additionally, at-home self examinations may be helpful for detecting a possible melanoma.  Use the ABCDEs we discussed and check your moles once a month at home.        LENTIGO        Assessment and Plan:  Based on a thorough discussion of this condition and the management approach to it (including a comprehensive discussion of the known risks, side effects and potential benefits of treatment), the patient (family) agrees to implement the following specific plan:  Reassured benign.   Practice sun protection. Apply broad spectrum (UVA and UVB) sunscreen, SPF 30 or higher every 2 hours. Wear sun protective clothing, hats, and sunglasses.     What is a lentigo?  A lentigo is a pigmented flat or slightly raised lesion with a clearly defined edge. Unlike an ephelis (freckle), it does not fade in the winter months. There are several kinds of lentigo.  The name lentigo originally referred to its appearance resembling a small lentil. The plural of lentigo is lentigines, although “lentigos” is also in common use.    Who gets lentigines?  Lentigines can affect males and females of all ages and races. Solar lentigines are especially prevalent in fair skinned adults. Lentigines associated with syndromes are present at birth or arise during childhood.    What causes lentigines?  Common forms of lentigo are due to exposure to ultraviolet radiation:  Sun damage including sunburn   Indoor tanning   Phototherapy, especially photochemotherapy (PUVA)    Ionizing " radiation, eg radiation therapy, can also cause lentigines.  Several familial syndromes associated with widespread lentigines originate from mutations in [place]-MAP kinase, mTOR signaling and PTEN pathways.    What are the clinical features of lentigines?  Lentigines have been classified into several different types depending on what they look like, where they appear on the body, causative factors, and whether they are associated to other diseases or conditions.  Lentigines may be solitary or more often, multiple. Most lentigines are smaller than 5 mm in diameter.    Lentigo simplex  A precursor to junctional naevus   Arises during childhood and early adult life   Found on trunk and limbs   Small brown round or oval macule or thin plaque   Jagged or smooth edge   May have a dry surface   May disappear in time  Solar lentigo  A precursor to seborrhoeic keratosis   Found on chronically sun exposed sites such as hands, face, lower legs   May also follow sunburn to shoulders   Yellow, light or dark brown regular or irregular macule or thin plaque   May have a dry surface   Often has moth-eaten outline   Can slowly enlarge to several centimeters in diameter   May disappear, often through the process known as lichenoid keratosis   When atypical in appearance, may be difficult to distinguish from melanoma in situ  Ink spot lentigo  Also known as reticulated lentigo   Few in number compared to solar lentigines   Follows sunburn in very fair skinned individuals   Dark brown to black irregular ink spot-like macule  PUVA lentigo  Similar to ink spot lentigo but follows photochemotherapy (PUVA)   Location anywhere exposed to PUVA  Tanning bed lentigo  Similar to ink spot lentigo but follows indoor tanning   Location anywhere exposed to tanning bed  Radiation lentigo  Occurs in site of irradiation (accidental or therapeutic)   Associated with late-stage radiation dermatitis: epidermal atrophy, subcutaneous fibrosis, keratosis,  telangiectasias  Melanotic macule  Mucosal surfaces or adjacent glabrous skin eg lip, vulva, penis, anus   Light to dark brown   Also called mucosal melanosis  Generalised lentigines  Found on any exposed or covered site from early childhood   Small macules may merge to form larger patches   Not associated with a syndrome   Also called lentigines profusa, multiple lentigines  Agminated lentigines  Naevoid eruption of lentigos confined to a single segmental area   Sharp demarcation in midline   May have associated neurological and developmental abnormalities  Patterned lentigines  Inherited tendency to lentigines on face, lips, buttocks, palms, soles   Recognised mainly in people of  ethnicity  Centrofacial neurodysraphic lentiginosis  Associated with mental retardation  Lentiginosis syndromes  Syndromes include LEOPARD/Fletcher, Peutz-Jeghers, Laugier-Hunziker, Moynahan, Xeroderma pigmentosum, myxoma syndromes (CORTEZ, NAME, Weinstein), Ruvalcaba-Myhre-Seymour, Bannayan-Zonnana syndrome, Cowden disease (multiple hamartoma syndrome )   Inheritance is autosomal dominant; sporadic cases common   Widespread lentigines present at birth or arise in early childhood   Associated with neural, endocrine, and mesenchymal tumors    How is the diagnosis made?  Lentigines are usually diagnosed clinically by their typical appearance. Concern regarding possibility of melanoma may lead to:  Dermatoscopy   Diagnostic excision biopsy    Histopathology of a lentigo shows:  Thickened epidermis   An increased number of melanocytes along the basal layer of epidermis   Unlike junctional melanocytic naevus, there are no nests of melanocytes   Increased melanin pigment within the keratinocytes   Additional features depending on type of lentigo    In contrast, an ephelis (freckle) shows sun-induced increased melanin within the keratinocytes, without an increase in number of cells.  What is the treatment for lentigines?  Most lentigines are left  alone. Attempts to lighten them may not be successful. The following approaches are used:  SPF 50+ broad-spectrum sunscreen   Hydroquinone bleaching cream   Alpha hydroxy acids   Vitamin C   Retinoids   Azelaic acid   Chemical peels  Individual lesions can be permanently removed using:  Cryotherapy   Intense pulsed light   Pigment lasers    How can lentigines be prevented?  Lentigines associated with exposure ultraviolet radiation can be prevented by very careful sun protection. Clothing is more successful at preventing new lentigines than are sunscreens.    What is the outlook for lentigines?  Lentigines usually persist. They may increase in number with age and sun exposure. Some in sun-protected sites may fade and disappear.

## 2024-09-30 ENCOUNTER — OFFICE VISIT (OUTPATIENT)
Dept: CARDIOLOGY CLINIC | Facility: CLINIC | Age: 39
End: 2024-09-30
Payer: COMMERCIAL

## 2024-09-30 VITALS
HEART RATE: 75 BPM | OXYGEN SATURATION: 98 % | BODY MASS INDEX: 34.27 KG/M2 | WEIGHT: 170 LBS | HEIGHT: 59 IN | SYSTOLIC BLOOD PRESSURE: 114 MMHG | DIASTOLIC BLOOD PRESSURE: 80 MMHG

## 2024-09-30 DIAGNOSIS — E78.5 DYSLIPIDEMIA: ICD-10-CM

## 2024-09-30 DIAGNOSIS — R07.89 OTHER CHEST PAIN: ICD-10-CM

## 2024-09-30 DIAGNOSIS — Z86.32 HISTORY OF GESTATIONAL DIABETES: ICD-10-CM

## 2024-09-30 DIAGNOSIS — R42 LIGHTHEADEDNESS: ICD-10-CM

## 2024-09-30 DIAGNOSIS — D68.51 FACTOR V LEIDEN (HCC): Primary | ICD-10-CM

## 2024-09-30 DIAGNOSIS — R06.02 SHORTNESS OF BREATH: ICD-10-CM

## 2024-09-30 PROCEDURE — 93000 ELECTROCARDIOGRAM COMPLETE: CPT | Performed by: STUDENT IN AN ORGANIZED HEALTH CARE EDUCATION/TRAINING PROGRAM

## 2024-09-30 PROCEDURE — 99204 OFFICE O/P NEW MOD 45 MIN: CPT | Performed by: STUDENT IN AN ORGANIZED HEALTH CARE EDUCATION/TRAINING PROGRAM

## 2024-09-30 NOTE — PROGRESS NOTES
[facility] [place]  [address] [facility] [address]  Phone#  [phone]  Fax#  [phone]  [facility] Cardiology Office Consultation             NAME: Robert Serrano  AGE: 39 y.o. SEX: female   : 1985   MRN: [id number]    DATE: 2024  TIME: 10:06 AM    Assessment & Plan  Other chest pain  Patient describes chest pain is not typical of cardiac chest pain.  She does have risk factors of dyslipidemia and history of gestational diabetes.  Due to her low to intermediate pretest probability of cardiac chest pain, I recommended doing a coronary artery calcium score for risk assessment.  -Coronary artery calcium score ordered  Shortness of breath  Patient does get shortness of breath that is not always exertional.  Associated with orthopnea.  Will obtain a transthoracic echocardiogram to assess for early systolic or diastolic dysfunction.  -Transthoracic echocardiogram ordered  Dyslipidemia  Stable.  Reviewed patient's last lipid panel with her.  We discussed that this is a risk factor for coronary artery disease. She is working on dietary changes as well as exercise to decrease weight.   -Working on lifestyle changes  -Will recheck lipid panel next year  Factor V Leiden (HCC)  Stable.  Will continue to monitor.  History of gestational diabetes  Stable.  Increased risk of cardiovascular disease.  Workup as above.    Follow up 2 months for review of test results        ----------------------------------------------------------------------------------    Chief Complaint   Patient presents with    Chest Pain     Gets chest pains; mostly at night. Cannot sleep on left side. Will experience left back pain as well     Shortness of Breath     Has been SOB lately; cannot catch breath     Dizziness     Lightheadedness randomly; will suddenly feel tired and need to lay down.    When laying down sometimes will have to get up because she cannot breath right; has to change positions      Palpitations     Randomly    New Patient Visit       HPI:    Robert Serrano is a 39 y.o.-year-old female who presents to the cardiology clinic for initial consultation.    She presents as a self-referral for chest pain.  Past medical history is significant for gestational diabetes, factor V Leiden deficiency, polycystic ovarian syndrome, iron deficiency.    She reports that over the past 2 months, she has had increased episodes of chest pain.  These are not always brought on by exertion or relieved with rest.  Occasionally happen at night.  It is a pressure-like, substernal chest pain.  It does not radiate.  Associated with shortness of breath and feeling winded.  Also notes orthopnea and increased fatigue.  Denies any palpitations.    Crystal history significant for: Her mother had an arrhythmia.  Reports her paternal aunt and uncle had valve disorders requiring surgical replacement.    Her ECG today showed normal sinus rhythm, normal ECG      I personally reviewed the patient's pertinent cardiac imaging and labs in Epic:    5/11/24 Lipid panel showed total cholesterol 198, HDL 38, ,            Past history, family history, social history, current medications, vital signs, recent lab and imaging studies and  prior cardiology studies reviewed independently on this visit.    No Known Allergies    Current Outpatient Medications:     clobetasol (TEMOVATE) 0.05 % cream, Apply to affected areas of outer ear twice a day for up to 2 weeks only when flaring, then as needed for flares. DO NOT PUSH DOWN EAR CANAL., Disp: 15 g, Rfl: 1    ketoconazole (NIZORAL) 2 % shampoo, Lather into scalp and allow to sit for 5-10 min then rinse. Please apply three times a week (Monday, Wednesday, Friday)., Disp: 100 mL, Rfl: 10    cyclobenzaprine (FLEXERIL) 5 mg tablet, TAKE ONE TABLET BY MOUTH AT BEDTIME (Patient not taking: Reported on 9/26/2024), Disp: 30 tablet, Rfl: 0    neomycin-polymyxin-hydrocortisone (CORTISPORIN) otic  solution, Administer 4 drops to the right ear every 8 (eight) hours (Patient not taking: Reported on 2024), Disp: 10 mL, Rfl: 0    pantoprazole (PROTONIX) 40 mg tablet, Take 1 tablet (40 mg total) by mouth daily (Patient not taking: Reported on 2024), Disp: , Rfl:     Past Medical History:   Diagnosis Date    Disease of thyroid gland     Eating disorder     Factor 5 Leiden mutation, heterozygous (HCC)     GERD (gastroesophageal reflux disease) January    Headache(784.0)     History of PCOS     MTHFR mutation     Pre-diabetes      Past Surgical History:   Procedure Laterality Date     SECTION  10/09/2020    [place]  DELIVERY ONLY N/A 10/09/2020    Procedure:  SECTION ();  Surgeon: Darrius Pham MD;  Location: [facility] ;  Service: Obstetrics    REDUCTION MAMMAPLASTY      WISDOM TOOTH EXTRACTION       Family History   Problem Relation Age of Onset    Diabetes Mother     Migraines Mother     Polycystic ovary syndrome Mother     Thyroid disease Mother     No Known Problems Father     Thyroid disease Sister     Diabetes Maternal Grandmother     Thyroid disease Maternal Grandmother     Thyroid disease Maternal Aunt     Melanoma Family        Social History   reports that she has never smoked. She has never been exposed to tobacco smoke. She has never used smokeless tobacco. She reports that she does not drink alcohol and does not use drugs.     Review of Systems   Constitutional:  Positive for fatigue.   HENT: Negative.     Respiratory:  Positive for chest tightness and shortness of breath.    Cardiovascular:  Positive for chest pain. Negative for palpitations and leg swelling.   Gastrointestinal: Negative.    Neurological: Negative.    Psychiatric/Behavioral: Negative.         Objective:     Vitals:    24 0959   BP: 114/80   SpO2: 98%     Physical Exam  Vitals reviewed.   Constitutional:       General: She is not in acute distress.     Appearance: She is well-developed. She is  "obese.   HENT:      Head: Normocephalic and atraumatic.   Eyes:      Conjunctiva/sclera: Conjunctivae normal.   Cardiovascular:      Rate and Rhythm: Normal rate and regular rhythm.      Heart sounds: No murmur heard.  Pulmonary:      Effort: Pulmonary effort is normal. No respiratory distress.      Breath sounds: Normal breath sounds.   Musculoskeletal:         General: No swelling.      Cervical back: Neck supple.   Skin:     General: Skin is warm and dry.   Neurological:      Mental Status: She is alert.   Psychiatric:         Mood and Affect: Mood normal.         Pertinent Laboratory/Diagnostic Studies:    Laboratory studies reviewed personally by Samira Khan MD    BMP:   Lab Results   Component Value Date    SODIUM 137 05/11/2024    K 4.1 05/11/2024     05/11/2024    CO2 26 05/11/2024    BUN 7 05/11/2024    CREATININE 0.64 05/11/2024    GLUC 107 03/29/2021    CALCIUM 9.1 05/11/2024     CBC:  Lab Results   Component Value Date    WBC 8.63 05/11/2024    HGB 13.5 05/11/2024    HCT 42.9 05/11/2024    MCV 90 05/11/2024     05/11/2024     Lipid Profile:   Lab Results   Component Value Date    HDL 38 (L) 05/11/2024     Lab Results   Component Value Date    LDLCALC 119 (H) 05/11/2024     Lab Results   Component Value Date    TRIG 203 (H) 05/11/2024      Other labs:  Lab Results   Component Value Date    XUV2PXOHIFHJ 1.499 05/11/2024     Lab Results   Component Value Date    ALT 31 05/11/2024    AST 20 05/11/2024       Imaging Studies:     Pertinent cardiac studies and imaging studies were personally reviewed on this visit and results summarized above.    Samira Khan MD, PhD    Portions of the record may have been created with voice recognition software.  Occasional wrong word or \"sound alike\" substitutions may have occurred due to the inherent limitations of voice recognition software.  Read the chart carefully and recognize, using context, where substitutions have occurred. Please reach out to me " directly for any clarifications.

## 2024-10-14 ENCOUNTER — HOSPITAL ENCOUNTER (OUTPATIENT)
Dept: RADIOLOGY | Facility: HOSPITAL | Age: 39
Discharge: HOME | End: 2024-10-14
Attending: STUDENT IN AN ORGANIZED HEALTH CARE EDUCATION/TRAINING PROGRAM
Payer: COMMERCIAL

## 2024-10-14 DIAGNOSIS — R07.89 OTHER CHEST PAIN: ICD-10-CM

## 2024-10-14 PROCEDURE — 75571 CT HRT W/O DYE W/CA TEST: CPT

## 2024-10-15 ENCOUNTER — ANNUAL EXAM (OUTPATIENT)
Dept: OBGYN CLINIC | Facility: CLINIC | Age: 39
End: 2024-10-15
Payer: COMMERCIAL

## 2024-10-15 VITALS — SYSTOLIC BLOOD PRESSURE: 122 MMHG | WEIGHT: 171 LBS | DIASTOLIC BLOOD PRESSURE: 76 MMHG | BODY MASS INDEX: 34.54 KG/M2

## 2024-10-15 DIAGNOSIS — R10.31 RIGHT LOWER QUADRANT PAIN: ICD-10-CM

## 2024-10-15 DIAGNOSIS — Z01.419 WOMEN'S ANNUAL ROUTINE GYNECOLOGICAL EXAMINATION: Primary | ICD-10-CM

## 2024-10-15 PROCEDURE — S0612 ANNUAL GYNECOLOGICAL EXAMINA: HCPCS | Performed by: OBSTETRICS & GYNECOLOGY

## 2024-10-15 NOTE — PROGRESS NOTES
Ambulatory Visit  Name: Robert Serrano      : 1985      MRN: [id number]  Encounter Provider: Darrius Pham MD  Encounter Date: 10/15/2024   Encounter department: OB GYN [facility]    Assessment & Plan  Women's annual routine gynecological examination  She is concerned about right lower quadrant discomfort.  Occasional dyspareunia.  She had an ultrasound earlier this year which was negative.  The ultrasound also showed a possible possible adenomyosis.  She does get some relief with Motrin.  She does not want any more children.  I have decided we will order a CT of the abdomen with contrast to make sure is nothing else is missing.  I talked about also the possibility of scar tissue from her  section scar.  She will be informed results when they return.  If her pain still persists no improvement I will discuss her the possibility of a diagnostic laparoscopy of the pelvis.       Right lower quadrant pain    Orders:    CT abdomen w contrast; Future      History of Present Illness     Robert Serrano is a 39 y.o. female who presents for her yearly GYN examination.  She is a  4 para 1 with 1  section approximately 4 years ago.  Her infant has a genetic issue.  Been followed in [facility].  Making progress.  She is no continues to complain about right lower quadrant discomfort.  She notes earlier this year possible adenomyosis.  The pain is worse with ovulation and with intercourse.  Denies any fever chills denies any nausea vomiting.  Does have a possible history of IBS.  After discussion with the negative ultrasound we have decided to go to a CT scan to be thorough.  I talked the patient with the possibility of a hysterectomy she is not ready for that at the present time.  She does not want any more children.  She feels safe at home.  She sees a dentist on a regular basis.  She is content with her weight would like to lose more.  She denies any prior depression or anxiety.  Occasionally slight  stress urine incontinence.  She does have a history of MTHFR deficiency.  The patient is not a candidate for birth control pills.  Her PHQ-9 is a 1 sometimes feeling tired and having little energy.      Review of Systems   Genitourinary:         Right lower quadrant pain   All other systems reviewed and are negative.          Objective     /76   Wt 77.6 kg (171 lb)   LMP 09/30/2024 (Exact Date)   BMI 34.54 kg/m²     Physical Exam  Vitals reviewed. Exam conducted with a chaperone present.   Constitutional:       Appearance: Normal appearance.   HENT:      Head: Normocephalic and atraumatic.      Mouth/Throat:      Mouth: Mucous membranes are moist.   Eyes:      Extraocular Movements: Extraocular movements intact.      Pupils: Pupils are equal, round, and reactive to light.   Cardiovascular:      Rate and Rhythm: Normal rate and regular rhythm.      Pulses: Normal pulses.      Heart sounds: Normal heart sounds.   Pulmonary:      Effort: Pulmonary effort is normal.      Breath sounds: Normal breath sounds.   Chest:   Breasts:     Right: Normal.      Left: Normal.      Comments: Bilateral scars from breast reduction surgery approximately 15 years ago.  Abdominal:      General: Abdomen is flat. Bowel sounds are normal. There is no distension.      Palpations: Abdomen is soft. There is no hepatomegaly, splenomegaly or mass.      Tenderness: There is abdominal tenderness in the right lower quadrant. There is no guarding or rebound.      Hernia: No hernia is present. There is no hernia in the left inguinal area or right inguinal area.          Comments: Tenderness extra scar well-healed.  Areas of pain shown on her drawing above.  I do not think this is GYN in origin.  We will make arrangements for CT scan of the abdomen.   Genitourinary:     General: Normal vulva.      Labia:         Right: No rash, tenderness, lesion or injury.         Left: No rash, tenderness, lesion or injury.       Urethra: No prolapse,  urethral pain, urethral swelling or urethral lesion.      Vagina: Normal.      Cervix: Normal.      Uterus: Normal.       Adnexa: Right adnexa normal and left adnexa normal.      Rectum: Normal.      Comments: The external genitalia within normal limit the vagina is clean.  Uterus is normal size.  There is no cervical motion tenderness.  The adnexa is clear bilaterally.  There is no palpable masses.  There was no cervical motion discomfort.  We still a concern for the possibility of endometriosis.  Pap smear was not performed.  The urethra and bladder normal working relationship.  Musculoskeletal:         General: Normal range of motion.      Cervical back: Normal range of motion and neck supple.   Lymphadenopathy:      Upper Body:      Right upper body: No supraclavicular adenopathy.      Left upper body: No supraclavicular adenopathy.   Skin:     General: Skin is warm.   Neurological:      General: No focal deficit present.      Mental Status: She is alert and oriented to person, place, and time.   Psychiatric:         Mood and Affect: Mood normal.         Behavior: Behavior normal.         Thought Content: Thought content normal.

## 2024-10-15 NOTE — ED ATTENDING ATTESTATION
Hamida Sotomayor MD, saw and evaluated the patient  I have discussed the patient with the resident/non-physician practitioner and agree with the resident's/non-physician practitioner's findings, Plan of Care, and MDM as documented in the resident's/non-physician practitioner's note, except where noted  All available labs and Radiology studies were reviewed  At this point I agree with the current assessment done in the Emergency Department  I have conducted an independent evaluation of this patient a history and physical is as follows:      Critical Care Time  CritCare Time    29 yo female , 6 weeks pregnant by dates, c/o uri symptoms and two days of lower abdominal cramping  Pt with hx of miscarriage  Pt with no ob or ultrasound this pregnancy  Pt c/o sinus congestion, no sore throat, no cp, no sob  No vaginal bleeding or discharge  No pmh  No fever, no urinary symptoms  Vss, afebrile, lungs cta, rrr, nasal congestion, tm clear, no sinus tenderness, left side toothache, no pharyngeal erythema, abdomen soft mild tenderness llq  Tsh, bhcg, type and screen, urine, cbc, tylenol 
details…

## 2024-10-15 NOTE — PATIENT INSTRUCTIONS
The patient expressed concern about her right lower quadrant discomfort.  We will now make arrangements for a CT of the abdomen with contrast.  If the CT of the abdomen is negative, I will discuss the patient with the possibility of diagnostic laparoscopy for further evaluation.  She will try to lose more weight.  She feels safe at home.  She sees a dentist on a regular basis.  She will start getting mammograms after her 40th birthday.  Denies any prior depression or anxiety.  She should return my office on a as needed basis.

## 2024-10-16 ENCOUNTER — TELEPHONE (OUTPATIENT)
Dept: OBGYN CLINIC | Facility: CLINIC | Age: 39
End: 2024-10-16

## 2024-10-16 NOTE — TELEPHONE ENCOUNTER
I called the patient this morning informed her that she needs to have a CMP test prior to her CT scan.  She will get it done this week she notes this is a fasting blood test.

## 2024-10-17 ENCOUNTER — APPOINTMENT (OUTPATIENT)
Dept: LAB | Age: 39
End: 2024-10-17
Payer: COMMERCIAL

## 2024-10-17 DIAGNOSIS — Z01.419 WOMEN'S ANNUAL ROUTINE GYNECOLOGICAL EXAMINATION: ICD-10-CM

## 2024-10-17 LAB
ALBUMIN SERPL BCG-MCNC: 4.1 G/DL (ref 3.5–5)
ALP SERPL-CCNC: 52 U/L (ref 34–104)
ALT SERPL W P-5'-P-CCNC: 19 U/L (ref 7–52)
ANION GAP SERPL CALCULATED.3IONS-SCNC: 9 MMOL/L (ref 4–13)
AST SERPL W P-5'-P-CCNC: 15 U/L (ref 13–39)
BILIRUB SERPL-MCNC: 0.37 MG/DL (ref 0.2–1)
BUN SERPL-MCNC: 6 MG/DL (ref 5–25)
CALCIUM SERPL-MCNC: 9.1 MG/DL (ref 8.4–10.2)
CHLORIDE SERPL-SCNC: 102 MMOL/L (ref 96–108)
CO2 SERPL-SCNC: 25 MMOL/L (ref 21–32)
CREAT SERPL-MCNC: 0.73 MG/DL (ref 0.6–1.3)
GFR SERPL CREATININE-BSD FRML MDRD: 104 ML/MIN/1.73SQ M
GLUCOSE P FAST SERPL-MCNC: 106 MG/DL (ref 65–99)
POTASSIUM SERPL-SCNC: 4.6 MMOL/L (ref 3.5–5.3)
PROT SERPL-MCNC: 7.4 G/DL (ref 6.4–8.4)
SODIUM SERPL-SCNC: 136 MMOL/L (ref 135–147)

## 2024-10-17 PROCEDURE — 80053 COMPREHEN METABOLIC PANEL: CPT

## 2024-10-17 PROCEDURE — 36415 COLL VENOUS BLD VENIPUNCTURE: CPT

## 2024-10-24 ENCOUNTER — HOSPITAL ENCOUNTER (OUTPATIENT)
Dept: NON INVASIVE DIAGNOSTICS | Facility: CLINIC | Age: 39
Discharge: HOME | End: 2024-10-24
Payer: COMMERCIAL

## 2024-10-24 VITALS
HEIGHT: 59 IN | SYSTOLIC BLOOD PRESSURE: 122 MMHG | BODY MASS INDEX: 34.47 KG/M2 | WEIGHT: 171 LBS | HEART RATE: 75 BPM | DIASTOLIC BLOOD PRESSURE: 76 MMHG

## 2024-10-24 DIAGNOSIS — R06.02 SHORTNESS OF BREATH: ICD-10-CM

## 2024-10-24 LAB
AORTIC ROOT: 2.9 CM
APICAL FOUR CHAMBER EJECTION FRACTION: 59 %
ASCENDING AORTA: 2.8 CM
BSA FOR ECHO PROCEDURE: 1.73 M2
E WAVE DECELERATION TIME: 161 MS
E/A RATIO: 1.25
FRACTIONAL SHORTENING: 36 (ref 28–44)
INTERVENTRICULAR SEPTUM IN DIASTOLE (PARASTERNAL SHORT AXIS VIEW): 0.9 CM
INTERVENTRICULAR SEPTUM: 0.9 CM (ref 0.6–1.1)
LAAS-AP2: 9.5 CM2
LAAS-AP4: 10.8 CM2
LEFT ATRIUM SIZE: 3.6 CM
LEFT ATRIUM VOLUME (MOD BIPLANE): 21 ML
LEFT ATRIUM VOLUME INDEX (MOD BIPLANE): 12.1 ML/M2
LEFT INTERNAL DIMENSION IN SYSTOLE: 2.5 CM (ref 2.1–4)
LEFT VENTRICULAR INTERNAL DIMENSION IN DIASTOLE: 3.9 CM (ref 3.5–6)
LEFT VENTRICULAR POSTERIOR WALL IN END DIASTOLE: 0.9 CM
LEFT VENTRICULAR STROKE VOLUME: 44 ML
LVSV (TEICH): 44 ML
MV E'TISSUE VEL-LAT: 15 CM/S
MV E'TISSUE VEL-SEP: 12 CM/S
MV PEAK A VEL: 0.85 M/S
MV PEAK E VEL: 106 CM/S
MV STENOSIS PRESSURE HALF TIME: 47 MS
MV VALVE AREA P 1/2 METHOD: 4.68
RIGHT ATRIUM AREA SYSTOLE A4C: 9.4 CM2
RIGHT VENTRICLE ID DIMENSION: 3.9 CM
SL CV LEFT ATRIUM LENGTH A2C: 3.9 CM
SL CV LV EF: 60
SL CV PED ECHO LEFT VENTRICLE DIASTOLIC VOLUME (MOD BIPLANE) 2D: 66 ML
SL CV PED ECHO LEFT VENTRICLE SYSTOLIC VOLUME (MOD BIPLANE) 2D: 22 ML
TRICUSPID ANNULAR PLANE SYSTOLIC EXCURSION: 1.8 CM

## 2024-10-24 PROCEDURE — 93306 TTE W/DOPPLER COMPLETE: CPT | Performed by: INTERNAL MEDICINE

## 2024-10-24 PROCEDURE — 93306 TTE W/DOPPLER COMPLETE: CPT

## 2025-01-03 ENCOUNTER — PATIENT MESSAGE (OUTPATIENT)
Dept: CARDIOLOGY CLINIC | Facility: CLINIC | Age: 40
End: 2025-01-03

## (undated) DEVICE — 3M™ IOBAN™ 2 ANTIMICROBIAL INCISE DRAPE 6651EZ: Brand: IOBAN™ 2

## (undated) DEVICE — PACK C-SECTION PBDS

## (undated) DEVICE — GLOVE SRG BIOGEL 7

## (undated) DEVICE — GLOVE SRG BIOGEL 7.5

## (undated) DEVICE — SKIN MARKER DUAL TIP WITH RULER CAP, FLEXIBLE RULER AND LABELS: Brand: DEVON

## (undated) DEVICE — Device

## (undated) DEVICE — SUT VICRYL 0 CT-1 27 IN J260H

## (undated) DEVICE — ABDOMINAL PAD: Brand: DERMACEA

## (undated) DEVICE — CHLORAPREP HI-LITE 26ML ORANGE

## (undated) DEVICE — TELFA NON-ADHERENT ABSORBENT DRESSING: Brand: TELFA

## (undated) DEVICE — GAUZE SPONGES,16 PLY: Brand: CURITY

## (undated) DEVICE — ADHESIVE SKIN HIGH VISCOSITY EXOFIN 1ML

## (undated) DEVICE — SUT MONOCRYL 4-0 PS-2 27 IN Y426H

## (undated) DEVICE — SUT VICRYL 0 CTX 36 IN J978H